# Patient Record
Sex: MALE | Race: WHITE | Employment: OTHER | URBAN - METROPOLITAN AREA
[De-identification: names, ages, dates, MRNs, and addresses within clinical notes are randomized per-mention and may not be internally consistent; named-entity substitution may affect disease eponyms.]

---

## 2017-03-15 ENCOUNTER — HOSPITAL ENCOUNTER (OUTPATIENT)
Dept: OCCUPATIONAL THERAPY | Age: 82
Setting detail: THERAPIES SERIES
Discharge: HOME | End: 2017-03-15
Payer: MEDICARE

## 2017-03-15 PROCEDURE — 97165 OT EVAL LOW COMPLEX 30 MIN: CPT

## 2017-03-15 PROCEDURE — 97110 THERAPEUTIC EXERCISES: CPT

## 2017-03-15 PROCEDURE — G8988 SELF CARE GOAL STATUS: HCPCS

## 2017-03-15 PROCEDURE — G8989 SELF CARE D/C STATUS: HCPCS

## 2017-03-15 PROCEDURE — G8987 SELF CARE CURRENT STATUS: HCPCS

## 2017-03-17 ENCOUNTER — HOSPITAL ENCOUNTER (OUTPATIENT)
Dept: OCCUPATIONAL THERAPY | Age: 82
Setting detail: THERAPIES SERIES
Discharge: HOME | End: 2017-03-17
Payer: MEDICARE

## 2017-03-17 PROCEDURE — 97110 THERAPEUTIC EXERCISES: CPT

## 2017-03-21 ENCOUNTER — HOSPITAL ENCOUNTER (OUTPATIENT)
Dept: OCCUPATIONAL THERAPY | Age: 82
Setting detail: THERAPIES SERIES
Discharge: HOME | End: 2017-03-21
Payer: MEDICARE

## 2017-03-21 PROCEDURE — 97110 THERAPEUTIC EXERCISES: CPT

## 2017-03-24 ENCOUNTER — HOSPITAL ENCOUNTER (OUTPATIENT)
Dept: OCCUPATIONAL THERAPY | Age: 82
Setting detail: THERAPIES SERIES
Discharge: HOME | End: 2017-03-24
Payer: MEDICARE

## 2017-03-24 PROCEDURE — 97110 THERAPEUTIC EXERCISES: CPT

## 2017-03-27 ENCOUNTER — HOSPITAL ENCOUNTER (OUTPATIENT)
Dept: OCCUPATIONAL THERAPY | Age: 82
Setting detail: THERAPIES SERIES
Discharge: HOME | End: 2017-03-27
Payer: MEDICARE

## 2017-03-27 PROCEDURE — 97110 THERAPEUTIC EXERCISES: CPT

## 2017-03-30 ENCOUNTER — HOSPITAL ENCOUNTER (OUTPATIENT)
Dept: OCCUPATIONAL THERAPY | Age: 82
Setting detail: THERAPIES SERIES
Discharge: HOME | End: 2017-03-30
Payer: MEDICARE

## 2017-03-30 PROCEDURE — 97110 THERAPEUTIC EXERCISES: CPT

## 2017-04-03 ENCOUNTER — OFFICE VISIT (OUTPATIENT)
Dept: PULMONOLOGY | Age: 82
End: 2017-04-03
Payer: MEDICARE

## 2017-04-03 VITALS
WEIGHT: 140.2 LBS | BODY MASS INDEX: 22 KG/M2 | OXYGEN SATURATION: 97 % | TEMPERATURE: 97 F | SYSTOLIC BLOOD PRESSURE: 121 MMHG | RESPIRATION RATE: 16 BRPM | HEIGHT: 67 IN | HEART RATE: 85 BPM | DIASTOLIC BLOOD PRESSURE: 78 MMHG

## 2017-04-03 DIAGNOSIS — J43.9 PULMONARY EMPHYSEMA, UNSPECIFIED EMPHYSEMA TYPE (HCC): Primary | ICD-10-CM

## 2017-04-03 DIAGNOSIS — I48.20 CHRONIC ATRIAL FIBRILLATION (HCC): ICD-10-CM

## 2017-04-03 DIAGNOSIS — C32.9 LARYNGEAL CANCER (HCC): ICD-10-CM

## 2017-04-03 PROCEDURE — 99214 OFFICE O/P EST MOD 30 MIN: CPT | Performed by: INTERNAL MEDICINE

## 2017-04-04 ENCOUNTER — HOSPITAL ENCOUNTER (OUTPATIENT)
Dept: OCCUPATIONAL THERAPY | Age: 82
Setting detail: THERAPIES SERIES
Discharge: HOME | End: 2017-04-04
Payer: MEDICARE

## 2017-04-04 PROCEDURE — 97110 THERAPEUTIC EXERCISES: CPT

## 2017-04-07 ENCOUNTER — APPOINTMENT (OUTPATIENT)
Dept: OCCUPATIONAL THERAPY | Age: 82
End: 2017-04-07
Payer: MEDICARE

## 2017-04-11 ENCOUNTER — HOSPITAL ENCOUNTER (OUTPATIENT)
Dept: OCCUPATIONAL THERAPY | Age: 82
Setting detail: THERAPIES SERIES
Discharge: HOME | End: 2017-04-11
Payer: MEDICARE

## 2017-04-11 PROCEDURE — 97110 THERAPEUTIC EXERCISES: CPT

## 2017-04-18 ENCOUNTER — HOSPITAL ENCOUNTER (OUTPATIENT)
Dept: OCCUPATIONAL THERAPY | Age: 82
Setting detail: THERAPIES SERIES
Discharge: HOME | End: 2017-04-18
Payer: MEDICARE

## 2017-04-18 PROCEDURE — 97110 THERAPEUTIC EXERCISES: CPT

## 2017-04-21 ENCOUNTER — HOSPITAL ENCOUNTER (OUTPATIENT)
Dept: OCCUPATIONAL THERAPY | Age: 82
Setting detail: THERAPIES SERIES
Discharge: HOME | End: 2017-04-21
Payer: MEDICARE

## 2017-04-21 PROCEDURE — 97110 THERAPEUTIC EXERCISES: CPT

## 2017-04-25 ENCOUNTER — HOSPITAL ENCOUNTER (OUTPATIENT)
Dept: OCCUPATIONAL THERAPY | Age: 82
Setting detail: THERAPIES SERIES
Discharge: HOME | End: 2017-04-25
Payer: MEDICARE

## 2017-04-25 PROCEDURE — 97110 THERAPEUTIC EXERCISES: CPT

## 2017-04-26 ENCOUNTER — HOSPITAL ENCOUNTER (OUTPATIENT)
Dept: OCCUPATIONAL THERAPY | Age: 82
Setting detail: THERAPIES SERIES
Discharge: HOME | End: 2017-04-26
Payer: MEDICARE

## 2017-04-26 PROCEDURE — 97110 THERAPEUTIC EXERCISES: CPT

## 2017-04-28 ENCOUNTER — APPOINTMENT (OUTPATIENT)
Dept: OCCUPATIONAL THERAPY | Age: 82
End: 2017-04-28
Payer: MEDICARE

## 2017-06-05 ENCOUNTER — APPOINTMENT (OUTPATIENT)
Dept: CT IMAGING | Age: 82
DRG: 392 | End: 2017-06-05
Payer: MEDICARE

## 2017-06-05 ENCOUNTER — HOSPITAL ENCOUNTER (INPATIENT)
Age: 82
LOS: 2 days | Discharge: HOME | DRG: 392 | End: 2017-06-07
Attending: EMERGENCY MEDICINE | Admitting: FAMILY MEDICINE
Payer: MEDICARE

## 2017-06-05 DIAGNOSIS — K57.32 DIVERTICULITIS OF COLON: Primary | ICD-10-CM

## 2017-06-05 DIAGNOSIS — N30.00 ACUTE CYSTITIS WITHOUT HEMATURIA: ICD-10-CM

## 2017-06-05 LAB
-: ABNORMAL
ABSOLUTE EOS #: 0.35 K/UL (ref 0–0.4)
ABSOLUTE LYMPH #: 0.12 K/UL (ref 1–4.8)
ABSOLUTE MONO #: 1.3 K/UL (ref 0.1–0.8)
ALBUMIN SERPL-MCNC: 3.1 G/DL (ref 3.5–5.2)
ALBUMIN/GLOBULIN RATIO: 0.8 (ref 1–2.5)
ALP BLD-CCNC: 75 U/L (ref 40–129)
ALT SERPL-CCNC: 12 U/L (ref 5–41)
AMORPHOUS: ABNORMAL
ANION GAP SERPL CALCULATED.3IONS-SCNC: 13 MMOL/L (ref 9–17)
ANION GAP: 14 MMOL/L (ref 8–16)
AST SERPL-CCNC: 23 U/L
BACTERIA: ABNORMAL
BASOPHILS # BLD: 1 %
BASOPHILS ABSOLUTE: 0.12 K/UL (ref 0–0.2)
BILIRUB SERPL-MCNC: 0.67 MG/DL (ref 0.3–1.2)
BILIRUBIN DIRECT: 0.11 MG/DL
BILIRUBIN URINE: NEGATIVE
BILIRUBIN, INDIRECT: 0.56 MG/DL (ref 0–1)
BUN BLDV-MCNC: 17 MG/DL (ref 8–23)
BUN/CREAT BLD: ABNORMAL (ref 9–20)
CALCIUM SERPL-MCNC: 8.4 MG/DL (ref 8.6–10.4)
CASTS UA: ABNORMAL /LPF (ref 0–2)
CHLORIDE BLD-SCNC: 94 MMOL/L (ref 98–107)
CO2: 25 MMOL/L (ref 20–31)
COLOR: ABNORMAL
COMMENT UA: ABNORMAL
CREAT SERPL-MCNC: 0.83 MG/DL (ref 0.7–1.2)
CRYSTALS, UA: ABNORMAL /HPF
DIFFERENTIAL TYPE: ABNORMAL
EOSINOPHILS RELATIVE PERCENT: 3 %
EPITHELIAL CELLS UA: ABNORMAL /HPF (ref 0–5)
FIO2: ABNORMAL
GFR AFRICAN AMERICAN: >60 ML/MIN
GFR NON-AFRICAN AMERICAN: >60 ML/MIN
GFR SERPL CREATININE-BSD FRML MDRD: ABNORMAL ML/MIN/{1.73_M2}
GFR SERPL CREATININE-BSD FRML MDRD: ABNORMAL ML/MIN/{1.73_M2}
GLOBULIN: ABNORMAL G/DL (ref 1.5–3.8)
GLUCOSE BLD-MCNC: 107 MG/DL (ref 70–99)
GLUCOSE BLD-MCNC: 112 MG/DL (ref 74–106)
GLUCOSE URINE: NEGATIVE
HCO3 VENOUS: 31 MMOL/L (ref 24–30)
HCO3 VENOUS: 32 MMOL/L (ref 24–30)
HCT VFR BLD CALC: 37.7 % (ref 41–53)
HEMOGLOBIN: 12.5 G/DL (ref 13.5–17.5)
KETONES, URINE: NEGATIVE
LACTIC ACID, WHOLE BLOOD: 1.8 MMOL/L (ref 0.7–2.1)
LACTIC ACID: NORMAL MMOL/L
LEUKOCYTE ESTERASE, URINE: ABNORMAL
LIPASE: 17 U/L (ref 13–60)
LYMPHOCYTES # BLD: 1 %
MCH RBC QN AUTO: 27.1 PG (ref 26–34)
MCHC RBC AUTO-ENTMCNC: 33.1 G/DL (ref 31–37)
MCV RBC AUTO: 81.8 FL (ref 80–100)
MONOCYTES # BLD: 11 %
MORPHOLOGY: ABNORMAL
MUCUS: ABNORMAL
NEGATIVE BASE EXCESS, VEN: ABNORMAL (ref 0–2)
NEGATIVE BASE EXCESS, VEN: ABNORMAL (ref 0–2)
NITRITE, URINE: NEGATIVE
O2 DEVICE/FLOW/%: ABNORMAL
O2 SAT, VEN: 20 %
OTHER OBSERVATIONS UA: ABNORMAL
PATIENT TEMP: ABNORMAL
PCO2, VEN: 51 MM HG (ref 39–55)
PCO2, VEN: 51 MM HG (ref 39–55)
PDW BLD-RTO: 16.7 % (ref 12.5–15.4)
PH UA: 5.5 (ref 5–8)
PH VENOUS: 7.39 (ref 7.32–7.42)
PH VENOUS: 7.4 (ref 7.32–7.42)
PLATELET # BLD: 305 K/UL (ref 140–450)
PLATELET ESTIMATE: ABNORMAL
PMV BLD AUTO: 8.4 FL (ref 6–12)
PO2, VEN: 15 MM HG (ref 30–50)
POC BUN: 21 MG/DL (ref 6–20)
POC CHLORIDE: 97 MMOL/L (ref 98–110)
POC CREATININE: 0.9 MG/DL (ref 0.6–1.4)
POC HEMATOCRIT: 42 % (ref 41–53)
POC HEMOGLOBIN: 14.3 G/DL (ref 13.5–17.5)
POC LACTIC ACID, VEN: 1.27 MMOL/L (ref 0.9–1.7)
POC PCO2 TEMP: ABNORMAL MM HG
POC PH TEMP: ABNORMAL
POC PO2 TEMP: ABNORMAL MM HG
POC POTASSIUM: 4.9 MMOL/L (ref 3.5–5.1)
POC SODIUM: 137 MMOL/L (ref 136–145)
POC TROPONIN I: 0 NG/ML (ref 0–0.1)
POC TROPONIN I: 0.01 NG/ML (ref 0–0.1)
POC TROPONIN INTERP: NORMAL
POC TROPONIN INTERP: NORMAL
POSITIVE BASE EXCESS, VEN: 6 (ref 0–2)
POSITIVE BASE EXCESS, VEN: 7 (ref 0–2)
POTASSIUM SERPL-SCNC: 5.5 MMOL/L (ref 3.7–5.3)
PROTEIN UA: ABNORMAL
RBC # BLD: 4.6 M/UL (ref 4.5–5.9)
RBC # BLD: ABNORMAL 10*6/UL
RBC UA: ABNORMAL /HPF (ref 0–2)
RENAL EPITHELIAL, UA: ABNORMAL /HPF
SEG NEUTROPHILS: 84 %
SEGMENTED NEUTROPHILS ABSOLUTE COUNT: 9.91 K/UL (ref 1.8–7.7)
SODIUM BLD-SCNC: 132 MMOL/L (ref 135–144)
SPECIFIC GRAVITY UA: 1.02 (ref 1–1.03)
TOTAL CO2, VENOUS: 33 MMOL/L (ref 25–31)
TOTAL CO2, VENOUS: 34 MMOL/L (ref 25–31)
TOTAL PROTEIN: 6.8 G/DL (ref 6.4–8.3)
TRICHOMONAS: ABNORMAL
TURBIDITY: CLEAR
URINE HGB: NEGATIVE
UROBILINOGEN, URINE: NORMAL
WBC # BLD: 11.8 K/UL (ref 3.5–11)
WBC # BLD: ABNORMAL 10*3/UL
WBC UA: ABNORMAL /HPF (ref 0–5)
YEAST: ABNORMAL

## 2017-06-05 PROCEDURE — 85014 HEMATOCRIT: CPT

## 2017-06-05 PROCEDURE — 87040 BLOOD CULTURE FOR BACTERIA: CPT

## 2017-06-05 PROCEDURE — 1200000000 HC SEMI PRIVATE

## 2017-06-05 PROCEDURE — 83690 ASSAY OF LIPASE: CPT

## 2017-06-05 PROCEDURE — 84520 ASSAY OF UREA NITROGEN: CPT

## 2017-06-05 PROCEDURE — 6360000004 HC RX CONTRAST MEDICATION: Performed by: EMERGENCY MEDICINE

## 2017-06-05 PROCEDURE — 81001 URINALYSIS AUTO W/SCOPE: CPT

## 2017-06-05 PROCEDURE — 84484 ASSAY OF TROPONIN QUANT: CPT

## 2017-06-05 PROCEDURE — 83605 ASSAY OF LACTIC ACID: CPT

## 2017-06-05 PROCEDURE — 82947 ASSAY GLUCOSE BLOOD QUANT: CPT

## 2017-06-05 PROCEDURE — 84295 ASSAY OF SERUM SODIUM: CPT

## 2017-06-05 PROCEDURE — 80048 BASIC METABOLIC PNL TOTAL CA: CPT

## 2017-06-05 PROCEDURE — 96375 TX/PRO/DX INJ NEW DRUG ADDON: CPT

## 2017-06-05 PROCEDURE — 99285 EMERGENCY DEPT VISIT HI MDM: CPT

## 2017-06-05 PROCEDURE — 82435 ASSAY OF BLOOD CHLORIDE: CPT

## 2017-06-05 PROCEDURE — 80076 HEPATIC FUNCTION PANEL: CPT

## 2017-06-05 PROCEDURE — 6360000002 HC RX W HCPCS: Performed by: EMERGENCY MEDICINE

## 2017-06-05 PROCEDURE — 93005 ELECTROCARDIOGRAM TRACING: CPT

## 2017-06-05 PROCEDURE — 85025 COMPLETE CBC W/AUTO DIFF WBC: CPT

## 2017-06-05 PROCEDURE — 82565 ASSAY OF CREATININE: CPT

## 2017-06-05 PROCEDURE — 84132 ASSAY OF SERUM POTASSIUM: CPT

## 2017-06-05 PROCEDURE — 74177 CT ABD & PELVIS W/CONTRAST: CPT

## 2017-06-05 PROCEDURE — 96365 THER/PROPH/DIAG IV INF INIT: CPT

## 2017-06-05 PROCEDURE — 87086 URINE CULTURE/COLONY COUNT: CPT

## 2017-06-05 PROCEDURE — 2580000003 HC RX 258: Performed by: EMERGENCY MEDICINE

## 2017-06-05 PROCEDURE — 82803 BLOOD GASES ANY COMBINATION: CPT

## 2017-06-05 RX ORDER — SODIUM CHLORIDE 0.9 % (FLUSH) 0.9 %
10 SYRINGE (ML) INJECTION PRN
Status: DISCONTINUED | OUTPATIENT
Start: 2017-06-05 | End: 2017-06-06

## 2017-06-05 RX ORDER — DONEPEZIL HYDROCHLORIDE 10 MG/1
10 TABLET, FILM COATED ORAL NIGHTLY
Status: ON HOLD | COMMUNITY
End: 2018-07-17 | Stop reason: HOSPADM

## 2017-06-05 RX ORDER — SODIUM CHLORIDE 0.9 % (FLUSH) 0.9 %
10 SYRINGE (ML) INJECTION EVERY 12 HOURS SCHEDULED
Status: DISCONTINUED | OUTPATIENT
Start: 2017-06-05 | End: 2017-06-06

## 2017-06-05 RX ORDER — 0.9 % SODIUM CHLORIDE 0.9 %
1000 INTRAVENOUS SOLUTION INTRAVENOUS ONCE
Status: COMPLETED | OUTPATIENT
Start: 2017-06-05 | End: 2017-06-05

## 2017-06-05 RX ORDER — MORPHINE SULFATE 4 MG/ML
4 INJECTION, SOLUTION INTRAMUSCULAR; INTRAVENOUS ONCE
Status: DISCONTINUED | OUTPATIENT
Start: 2017-06-05 | End: 2017-06-05

## 2017-06-05 RX ORDER — KETOROLAC TROMETHAMINE 30 MG/ML
30 INJECTION, SOLUTION INTRAMUSCULAR; INTRAVENOUS ONCE
Status: COMPLETED | OUTPATIENT
Start: 2017-06-05 | End: 2017-06-05

## 2017-06-05 RX ADMIN — IOVERSOL 130 ML: 741 INJECTION INTRA-ARTERIAL; INTRAVENOUS at 21:19

## 2017-06-05 RX ADMIN — SODIUM CHLORIDE 1000 ML: 9 INJECTION, SOLUTION INTRAVENOUS at 20:39

## 2017-06-05 RX ADMIN — KETOROLAC TROMETHAMINE 30 MG: 30 INJECTION, SOLUTION INTRAMUSCULAR at 20:38

## 2017-06-05 RX ADMIN — CEFOXITIN 1 G: 1 INJECTION, POWDER, FOR SOLUTION INTRAVENOUS at 20:39

## 2017-06-05 ASSESSMENT — ENCOUNTER SYMPTOMS
CONSTIPATION: 1
NAUSEA: 0
SHORTNESS OF BREATH: 0
ABDOMINAL DISTENTION: 0
COUGH: 0
ABDOMINAL PAIN: 1
VOMITING: 0
BACK PAIN: 0
DIARRHEA: 0
CHEST TIGHTNESS: 0
WHEEZING: 0
TROUBLE SWALLOWING: 0

## 2017-06-05 ASSESSMENT — PAIN SCALES - GENERAL: PAINLEVEL_OUTOF10: 6

## 2017-06-06 PROBLEM — K57.32 DIVERTICULITIS OF LARGE INTESTINE WITHOUT PERFORATION OR ABSCESS WITHOUT BLEEDING: Status: ACTIVE | Noted: 2017-06-06

## 2017-06-06 PROBLEM — R10.32 LEFT LOWER QUADRANT PAIN: Status: ACTIVE | Noted: 2017-06-06

## 2017-06-06 LAB
ABSOLUTE EOS #: 0.4 K/UL (ref 0–0.4)
ABSOLUTE LYMPH #: 0.5 K/UL (ref 1–4.8)
ABSOLUTE MONO #: 0.8 K/UL (ref 0.1–1.2)
ALBUMIN SERPL-MCNC: 2.4 G/DL (ref 3.5–5.2)
ALBUMIN/GLOBULIN RATIO: 0.8 (ref 1–2.5)
ALP BLD-CCNC: 61 U/L (ref 40–129)
ALT SERPL-CCNC: 8 U/L (ref 5–41)
ANION GAP SERPL CALCULATED.3IONS-SCNC: 13 MMOL/L (ref 9–17)
AST SERPL-CCNC: 12 U/L
BASOPHILS # BLD: 1 %
BASOPHILS ABSOLUTE: 0 K/UL (ref 0–0.2)
BILIRUB SERPL-MCNC: 0.83 MG/DL (ref 0.3–1.2)
BUN BLDV-MCNC: 17 MG/DL (ref 8–23)
BUN/CREAT BLD: ABNORMAL (ref 9–20)
CALCIUM SERPL-MCNC: 8.1 MG/DL (ref 8.6–10.4)
CHLORIDE BLD-SCNC: 104 MMOL/L (ref 98–107)
CO2: 19 MMOL/L (ref 20–31)
CREAT SERPL-MCNC: 0.78 MG/DL (ref 0.7–1.2)
CULTURE: NO GROWTH
CULTURE: NORMAL
DIFFERENTIAL TYPE: ABNORMAL
EOSINOPHILS RELATIVE PERCENT: 5 %
GFR AFRICAN AMERICAN: >60 ML/MIN
GFR NON-AFRICAN AMERICAN: >60 ML/MIN
GFR SERPL CREATININE-BSD FRML MDRD: ABNORMAL ML/MIN/{1.73_M2}
GFR SERPL CREATININE-BSD FRML MDRD: ABNORMAL ML/MIN/{1.73_M2}
GLUCOSE BLD-MCNC: 93 MG/DL (ref 70–99)
HCT VFR BLD CALC: 32.7 % (ref 41–53)
HEMOGLOBIN: 10.6 G/DL (ref 13.5–17.5)
LYMPHOCYTES # BLD: 6 %
Lab: NORMAL
MAGNESIUM: 2.1 MG/DL (ref 1.6–2.6)
MCH RBC QN AUTO: 27.2 PG (ref 26–34)
MCHC RBC AUTO-ENTMCNC: 32.4 G/DL (ref 31–37)
MCV RBC AUTO: 83.9 FL (ref 80–100)
MONOCYTES # BLD: 9 %
PDW BLD-RTO: 17.1 % (ref 12.5–15.4)
PLATELET # BLD: 283 K/UL (ref 140–450)
PLATELET ESTIMATE: ABNORMAL
PMV BLD AUTO: 7.7 FL (ref 6–12)
POTASSIUM SERPL-SCNC: 4.6 MMOL/L (ref 3.7–5.3)
RBC # BLD: 3.89 M/UL (ref 4.5–5.9)
RBC # BLD: ABNORMAL 10*6/UL
SEG NEUTROPHILS: 79 %
SEGMENTED NEUTROPHILS ABSOLUTE COUNT: 6.8 K/UL (ref 1.8–7.7)
SODIUM BLD-SCNC: 136 MMOL/L (ref 135–144)
SPECIMEN DESCRIPTION: NORMAL
STATUS: NORMAL
TOTAL PROTEIN: 5.5 G/DL (ref 6.4–8.3)
WBC # BLD: 8.5 K/UL (ref 3.5–11)
WBC # BLD: ABNORMAL 10*3/UL

## 2017-06-06 PROCEDURE — 94664 DEMO&/EVAL PT USE INHALER: CPT

## 2017-06-06 PROCEDURE — 2580000003 HC RX 258: Performed by: NURSE PRACTITIONER

## 2017-06-06 PROCEDURE — 82272 OCCULT BLD FECES 1-3 TESTS: CPT

## 2017-06-06 PROCEDURE — 83735 ASSAY OF MAGNESIUM: CPT

## 2017-06-06 PROCEDURE — 85025 COMPLETE CBC W/AUTO DIFF WBC: CPT

## 2017-06-06 PROCEDURE — 99223 1ST HOSP IP/OBS HIGH 75: CPT | Performed by: INTERNAL MEDICINE

## 2017-06-06 PROCEDURE — 94640 AIRWAY INHALATION TREATMENT: CPT

## 2017-06-06 PROCEDURE — 36415 COLL VENOUS BLD VENIPUNCTURE: CPT

## 2017-06-06 PROCEDURE — 6370000000 HC RX 637 (ALT 250 FOR IP): Performed by: NURSE PRACTITIONER

## 2017-06-06 PROCEDURE — 6360000002 HC RX W HCPCS: Performed by: NURSE PRACTITIONER

## 2017-06-06 PROCEDURE — 1200000000 HC SEMI PRIVATE

## 2017-06-06 PROCEDURE — 6360000002 HC RX W HCPCS: Performed by: EMERGENCY MEDICINE

## 2017-06-06 PROCEDURE — 80053 COMPREHEN METABOLIC PANEL: CPT

## 2017-06-06 PROCEDURE — 2580000003 HC RX 258: Performed by: EMERGENCY MEDICINE

## 2017-06-06 RX ORDER — TAMSULOSIN HYDROCHLORIDE 0.4 MG/1
0.4 CAPSULE ORAL DAILY
Status: DISCONTINUED | OUTPATIENT
Start: 2017-06-06 | End: 2017-06-07 | Stop reason: HOSPADM

## 2017-06-06 RX ORDER — GUAIFENESIN DEXTROMETHORPHAN HYDROBROMIDE ORAL SOLUTION 10; 100 MG/5ML; MG/5ML
10 SOLUTION ORAL EVERY 6 HOURS PRN
Status: DISCONTINUED | OUTPATIENT
Start: 2017-06-06 | End: 2017-06-07 | Stop reason: HOSPADM

## 2017-06-06 RX ORDER — POTASSIUM CHLORIDE 20 MEQ/1
40 TABLET, EXTENDED RELEASE ORAL PRN
Status: DISCONTINUED | OUTPATIENT
Start: 2017-06-06 | End: 2017-06-07 | Stop reason: HOSPADM

## 2017-06-06 RX ORDER — POTASSIUM CHLORIDE 20MEQ/15ML
40 LIQUID (ML) ORAL PRN
Status: DISCONTINUED | OUTPATIENT
Start: 2017-06-06 | End: 2017-06-07 | Stop reason: HOSPADM

## 2017-06-06 RX ORDER — ONDANSETRON 2 MG/ML
4 INJECTION INTRAMUSCULAR; INTRAVENOUS EVERY 6 HOURS PRN
Status: DISCONTINUED | OUTPATIENT
Start: 2017-06-06 | End: 2017-06-07 | Stop reason: HOSPADM

## 2017-06-06 RX ORDER — SODIUM CHLORIDE 0.9 % (FLUSH) 0.9 %
10 SYRINGE (ML) INJECTION EVERY 12 HOURS SCHEDULED
Status: DISCONTINUED | OUTPATIENT
Start: 2017-06-06 | End: 2017-06-07 | Stop reason: HOSPADM

## 2017-06-06 RX ORDER — SODIUM CHLORIDE 9 MG/ML
INJECTION, SOLUTION INTRAVENOUS CONTINUOUS
Status: DISCONTINUED | OUTPATIENT
Start: 2017-06-06 | End: 2017-06-07 | Stop reason: HOSPADM

## 2017-06-06 RX ORDER — BISACODYL 10 MG
10 SUPPOSITORY, RECTAL RECTAL DAILY PRN
Status: DISCONTINUED | OUTPATIENT
Start: 2017-06-06 | End: 2017-06-07 | Stop reason: HOSPADM

## 2017-06-06 RX ORDER — NICOTINE 21 MG/24HR
1 PATCH, TRANSDERMAL 24 HOURS TRANSDERMAL DAILY PRN
Status: DISCONTINUED | OUTPATIENT
Start: 2017-06-06 | End: 2017-06-07 | Stop reason: HOSPADM

## 2017-06-06 RX ORDER — POTASSIUM CHLORIDE 7.45 MG/ML
10 INJECTION INTRAVENOUS PRN
Status: DISCONTINUED | OUTPATIENT
Start: 2017-06-06 | End: 2017-06-07 | Stop reason: HOSPADM

## 2017-06-06 RX ORDER — MORPHINE SULFATE 2 MG/ML
2 INJECTION, SOLUTION INTRAMUSCULAR; INTRAVENOUS
Status: DISCONTINUED | OUTPATIENT
Start: 2017-06-06 | End: 2017-06-07 | Stop reason: HOSPADM

## 2017-06-06 RX ORDER — ZOLPIDEM TARTRATE 5 MG/1
10 TABLET ORAL NIGHTLY PRN
Status: DISCONTINUED | OUTPATIENT
Start: 2017-06-06 | End: 2017-06-07 | Stop reason: HOSPADM

## 2017-06-06 RX ORDER — METOPROLOL SUCCINATE 25 MG/1
12.5 TABLET, EXTENDED RELEASE ORAL 2 TIMES DAILY
Status: DISCONTINUED | OUTPATIENT
Start: 2017-06-06 | End: 2017-06-07 | Stop reason: HOSPADM

## 2017-06-06 RX ORDER — MORPHINE SULFATE 4 MG/ML
4 INJECTION, SOLUTION INTRAMUSCULAR; INTRAVENOUS
Status: DISCONTINUED | OUTPATIENT
Start: 2017-06-06 | End: 2017-06-07 | Stop reason: HOSPADM

## 2017-06-06 RX ORDER — MAGNESIUM SULFATE 1 G/100ML
1 INJECTION INTRAVENOUS PRN
Status: DISCONTINUED | OUTPATIENT
Start: 2017-06-06 | End: 2017-06-07 | Stop reason: HOSPADM

## 2017-06-06 RX ORDER — DONEPEZIL HYDROCHLORIDE 10 MG/1
10 TABLET, FILM COATED ORAL NIGHTLY
Status: DISCONTINUED | OUTPATIENT
Start: 2017-06-06 | End: 2017-06-07 | Stop reason: HOSPADM

## 2017-06-06 RX ORDER — SODIUM CHLORIDE 0.9 % (FLUSH) 0.9 %
10 SYRINGE (ML) INJECTION PRN
Status: DISCONTINUED | OUTPATIENT
Start: 2017-06-06 | End: 2017-06-07 | Stop reason: HOSPADM

## 2017-06-06 RX ORDER — ACETAMINOPHEN 325 MG/1
650 TABLET ORAL EVERY 4 HOURS PRN
Status: DISCONTINUED | OUTPATIENT
Start: 2017-06-06 | End: 2017-06-07 | Stop reason: HOSPADM

## 2017-06-06 RX ADMIN — PIPERACILLIN SODIUM,TAZOBACTAM SODIUM 3.38 G: 3; .375 INJECTION, POWDER, FOR SOLUTION INTRAVENOUS at 15:43

## 2017-06-06 RX ADMIN — PIPERACILLIN SODIUM,TAZOBACTAM SODIUM 3.38 G: 3; .375 INJECTION, POWDER, FOR SOLUTION INTRAVENOUS at 06:00

## 2017-06-06 RX ADMIN — SODIUM CHLORIDE: 900 INJECTION, SOLUTION INTRAVENOUS at 01:57

## 2017-06-06 RX ADMIN — RIVAROXABAN 20 MG: 20 TABLET, FILM COATED ORAL at 08:00

## 2017-06-06 RX ADMIN — PIPERACILLIN SODIUM,TAZOBACTAM SODIUM 3.38 G: 3; .375 INJECTION, POWDER, FOR SOLUTION INTRAVENOUS at 00:09

## 2017-06-06 RX ADMIN — METOPROLOL SUCCINATE 12.5 MG: 25 TABLET, FILM COATED, EXTENDED RELEASE ORAL at 09:00

## 2017-06-06 RX ADMIN — DONEPEZIL HYDROCHLORIDE 10 MG: 10 TABLET, FILM COATED ORAL at 22:23

## 2017-06-06 RX ADMIN — ZOLPIDEM TARTRATE 10 MG: 5 TABLET ORAL at 22:23

## 2017-06-06 RX ADMIN — DONEPEZIL HYDROCHLORIDE 10 MG: 10 TABLET, FILM COATED ORAL at 01:51

## 2017-06-06 RX ADMIN — METOPROLOL SUCCINATE 12.5 MG: 25 TABLET, FILM COATED, EXTENDED RELEASE ORAL at 22:05

## 2017-06-06 RX ADMIN — MOMETASONE FUROATE AND FORMOTEROL FUMARATE DIHYDRATE 2 PUFF: 100; 5 AEROSOL RESPIRATORY (INHALATION) at 22:13

## 2017-06-06 RX ADMIN — PIPERACILLIN SODIUM,TAZOBACTAM SODIUM 3.38 G: 3; .375 INJECTION, POWDER, FOR SOLUTION INTRAVENOUS at 22:05

## 2017-06-06 ASSESSMENT — PAIN SCALES - GENERAL
PAINLEVEL_OUTOF10: 0

## 2017-06-07 VITALS
SYSTOLIC BLOOD PRESSURE: 122 MMHG | HEART RATE: 64 BPM | DIASTOLIC BLOOD PRESSURE: 72 MMHG | RESPIRATION RATE: 18 BRPM | WEIGHT: 135.58 LBS | OXYGEN SATURATION: 96 % | BODY MASS INDEX: 21.28 KG/M2 | TEMPERATURE: 97.5 F | HEIGHT: 67 IN

## 2017-06-07 LAB
ABSOLUTE EOS #: 0.59 K/UL (ref 0–0.4)
ABSOLUTE LYMPH #: 0.52 K/UL (ref 1–4.8)
ABSOLUTE MONO #: 0.72 K/UL (ref 0.1–1.2)
ANION GAP SERPL CALCULATED.3IONS-SCNC: 13 MMOL/L (ref 9–17)
BASOPHILS # BLD: 1 %
BASOPHILS ABSOLUTE: 0.07 K/UL (ref 0–0.2)
BUN BLDV-MCNC: 11 MG/DL (ref 8–23)
BUN/CREAT BLD: ABNORMAL (ref 9–20)
CALCIUM SERPL-MCNC: 7.7 MG/DL (ref 8.6–10.4)
CHLORIDE BLD-SCNC: 102 MMOL/L (ref 98–107)
CO2: 21 MMOL/L (ref 20–31)
CREAT SERPL-MCNC: 0.77 MG/DL (ref 0.7–1.2)
DIFFERENTIAL TYPE: ABNORMAL
EOSINOPHILS RELATIVE PERCENT: 9 %
GFR AFRICAN AMERICAN: >60 ML/MIN
GFR NON-AFRICAN AMERICAN: >60 ML/MIN
GFR SERPL CREATININE-BSD FRML MDRD: ABNORMAL ML/MIN/{1.73_M2}
GFR SERPL CREATININE-BSD FRML MDRD: ABNORMAL ML/MIN/{1.73_M2}
GLUCOSE BLD-MCNC: 89 MG/DL (ref 70–99)
HCT VFR BLD CALC: 31.3 % (ref 41–53)
HEMOGLOBIN: 10.2 G/DL (ref 13.5–17.5)
LYMPHOCYTES # BLD: 8 %
MCH RBC QN AUTO: 27.1 PG (ref 26–34)
MCHC RBC AUTO-ENTMCNC: 32.5 G/DL (ref 31–37)
MCV RBC AUTO: 83.5 FL (ref 80–100)
MONOCYTES # BLD: 11 %
MORPHOLOGY: ABNORMAL
PDW BLD-RTO: 16.7 % (ref 12.5–15.4)
PLATELET # BLD: 266 K/UL (ref 140–450)
PLATELET ESTIMATE: ABNORMAL
PMV BLD AUTO: 7.8 FL (ref 6–12)
POTASSIUM SERPL-SCNC: 4.1 MMOL/L (ref 3.7–5.3)
RBC # BLD: 3.75 M/UL (ref 4.5–5.9)
RBC # BLD: ABNORMAL 10*6/UL
SEG NEUTROPHILS: 71 %
SEGMENTED NEUTROPHILS ABSOLUTE COUNT: 4.6 K/UL (ref 1.8–7.7)
SODIUM BLD-SCNC: 136 MMOL/L (ref 135–144)
WBC # BLD: 6.5 K/UL (ref 3.5–11)
WBC # BLD: ABNORMAL 10*3/UL

## 2017-06-07 PROCEDURE — 36415 COLL VENOUS BLD VENIPUNCTURE: CPT

## 2017-06-07 PROCEDURE — 85025 COMPLETE CBC W/AUTO DIFF WBC: CPT

## 2017-06-07 PROCEDURE — 99239 HOSP IP/OBS DSCHRG MGMT >30: CPT | Performed by: INTERNAL MEDICINE

## 2017-06-07 PROCEDURE — 6370000000 HC RX 637 (ALT 250 FOR IP): Performed by: INTERNAL MEDICINE

## 2017-06-07 PROCEDURE — 6370000000 HC RX 637 (ALT 250 FOR IP): Performed by: NURSE PRACTITIONER

## 2017-06-07 PROCEDURE — 2580000003 HC RX 258: Performed by: NURSE PRACTITIONER

## 2017-06-07 PROCEDURE — 6360000002 HC RX W HCPCS: Performed by: NURSE PRACTITIONER

## 2017-06-07 PROCEDURE — 80048 BASIC METABOLIC PNL TOTAL CA: CPT

## 2017-06-07 RX ORDER — METRONIDAZOLE 500 MG/1
500 TABLET ORAL EVERY 8 HOURS SCHEDULED
Status: DISCONTINUED | OUTPATIENT
Start: 2017-06-07 | End: 2017-06-07 | Stop reason: HOSPADM

## 2017-06-07 RX ORDER — SULFAMETHOXAZOLE AND TRIMETHOPRIM 800; 160 MG/1; MG/1
1 TABLET ORAL EVERY 12 HOURS SCHEDULED
Status: DISCONTINUED | OUTPATIENT
Start: 2017-06-07 | End: 2017-06-07 | Stop reason: HOSPADM

## 2017-06-07 RX ORDER — SULFAMETHOXAZOLE AND TRIMETHOPRIM 800; 160 MG/1; MG/1
1 TABLET ORAL EVERY 12 HOURS SCHEDULED
Qty: 18 TABLET | Refills: 0 | Status: SHIPPED | OUTPATIENT
Start: 2017-06-07 | End: 2017-06-16

## 2017-06-07 RX ORDER — METRONIDAZOLE 500 MG/1
500 TABLET ORAL EVERY 8 HOURS SCHEDULED
Qty: 27 TABLET | Refills: 0 | Status: SHIPPED | OUTPATIENT
Start: 2017-06-07 | End: 2017-06-16

## 2017-06-07 RX ADMIN — SODIUM CHLORIDE: 900 INJECTION, SOLUTION INTRAVENOUS at 04:32

## 2017-06-07 RX ADMIN — METRONIDAZOLE 500 MG: 500 TABLET ORAL at 14:03

## 2017-06-07 RX ADMIN — PIPERACILLIN SODIUM,TAZOBACTAM SODIUM 3.38 G: 3; .375 INJECTION, POWDER, FOR SOLUTION INTRAVENOUS at 06:13

## 2017-06-07 RX ADMIN — RIVAROXABAN 20 MG: 20 TABLET, FILM COATED ORAL at 09:26

## 2017-06-07 RX ADMIN — METOPROLOL SUCCINATE 12.5 MG: 25 TABLET, FILM COATED, EXTENDED RELEASE ORAL at 09:26

## 2017-06-07 ASSESSMENT — PAIN SCALES - GENERAL
PAINLEVEL_OUTOF10: 0
PAINLEVEL_OUTOF10: 0

## 2017-06-09 LAB
EKG ATRIAL RATE: 94 BPM
EKG P AXIS: 70 DEGREES
EKG P-R INTERVAL: 160 MS
EKG Q-T INTERVAL: 340 MS
EKG QRS DURATION: 88 MS
EKG QTC CALCULATION (BAZETT): 425 MS
EKG R AXIS: 76 DEGREES
EKG T AXIS: 29 DEGREES
EKG VENTRICULAR RATE: 94 BPM

## 2017-06-11 LAB
CULTURE: NORMAL
Lab: NORMAL
Lab: NORMAL
SPECIMEN DESCRIPTION: NORMAL
SPECIMEN DESCRIPTION: NORMAL
STATUS: NORMAL
STATUS: NORMAL

## 2017-08-09 RX ORDER — LEVOFLOXACIN 500 MG/1
500 TABLET, FILM COATED ORAL DAILY
Qty: 10 TABLET | Refills: 1 | Status: SHIPPED | OUTPATIENT
Start: 2017-08-09 | End: 2017-08-19

## 2017-12-26 RX ORDER — IPRATROPIUM/ALBUTEROL SULFATE 20-100 MCG
MIST INHALER (GRAM) INHALATION
Qty: 4 G | Refills: 11 | Status: SHIPPED | OUTPATIENT
Start: 2017-12-26 | End: 2020-07-13 | Stop reason: SDUPTHER

## 2018-03-01 RX ORDER — LEVOFLOXACIN 500 MG/1
TABLET, FILM COATED ORAL
Qty: 10 TABLET | Refills: 0 | Status: SHIPPED | OUTPATIENT
Start: 2018-03-01 | End: 2018-04-05 | Stop reason: ALTCHOICE

## 2018-04-05 ENCOUNTER — OFFICE VISIT (OUTPATIENT)
Dept: PULMONOLOGY | Age: 83
End: 2018-04-05
Payer: MEDICARE

## 2018-04-05 VITALS
SYSTOLIC BLOOD PRESSURE: 120 MMHG | RESPIRATION RATE: 14 BRPM | TEMPERATURE: 96.6 F | DIASTOLIC BLOOD PRESSURE: 76 MMHG | BODY MASS INDEX: 23.13 KG/M2 | OXYGEN SATURATION: 98 % | HEIGHT: 67 IN | WEIGHT: 147.4 LBS | HEART RATE: 71 BPM

## 2018-04-05 DIAGNOSIS — C32.9 CARCINOMA LARYNX (HCC): ICD-10-CM

## 2018-04-05 DIAGNOSIS — I48.20 CHRONIC ATRIAL FIBRILLATION (HCC): ICD-10-CM

## 2018-04-05 DIAGNOSIS — J44.9 COPD WITH CHRONIC BRONCHITIS AND EMPHYSEMA (HCC): Primary | ICD-10-CM

## 2018-04-05 PROCEDURE — 3023F SPIROM DOC REV: CPT | Performed by: INTERNAL MEDICINE

## 2018-04-05 PROCEDURE — G8420 CALC BMI NORM PARAMETERS: HCPCS | Performed by: INTERNAL MEDICINE

## 2018-04-05 PROCEDURE — 1036F TOBACCO NON-USER: CPT | Performed by: INTERNAL MEDICINE

## 2018-04-05 PROCEDURE — 4040F PNEUMOC VAC/ADMIN/RCVD: CPT | Performed by: INTERNAL MEDICINE

## 2018-04-05 PROCEDURE — G8926 SPIRO NO PERF OR DOC: HCPCS | Performed by: INTERNAL MEDICINE

## 2018-04-05 PROCEDURE — G8427 DOCREV CUR MEDS BY ELIG CLIN: HCPCS | Performed by: INTERNAL MEDICINE

## 2018-04-05 PROCEDURE — 99214 OFFICE O/P EST MOD 30 MIN: CPT | Performed by: INTERNAL MEDICINE

## 2018-04-05 PROCEDURE — 1123F ACP DISCUSS/DSCN MKR DOCD: CPT | Performed by: INTERNAL MEDICINE

## 2018-04-27 RX ORDER — LEVOFLOXACIN 500 MG/1
500 TABLET, FILM COATED ORAL DAILY
Qty: 10 TABLET | Refills: 0 | Status: SHIPPED | OUTPATIENT
Start: 2018-04-27 | End: 2018-05-07

## 2018-04-30 ENCOUNTER — HOSPITAL ENCOUNTER (OUTPATIENT)
Age: 83
Setting detail: OBSERVATION
Discharge: HOME | End: 2018-04-30
Attending: EMERGENCY MEDICINE | Admitting: EMERGENCY MEDICINE
Payer: MEDICARE

## 2018-04-30 ENCOUNTER — APPOINTMENT (OUTPATIENT)
Dept: GENERAL RADIOLOGY | Age: 83
End: 2018-04-30
Payer: MEDICARE

## 2018-04-30 VITALS
BODY MASS INDEX: 21.45 KG/M2 | HEART RATE: 93 BPM | TEMPERATURE: 98.3 F | OXYGEN SATURATION: 95 % | RESPIRATION RATE: 16 BRPM | HEIGHT: 67 IN | DIASTOLIC BLOOD PRESSURE: 74 MMHG | SYSTOLIC BLOOD PRESSURE: 120 MMHG | WEIGHT: 136.69 LBS

## 2018-04-30 DIAGNOSIS — L03.115 CELLULITIS OF RIGHT LOWER EXTREMITY: Primary | ICD-10-CM

## 2018-04-30 DIAGNOSIS — I48.20 CHRONIC ATRIAL FIBRILLATION (HCC): ICD-10-CM

## 2018-04-30 DIAGNOSIS — D72.829 LEUKOCYTOSIS, UNSPECIFIED TYPE: ICD-10-CM

## 2018-04-30 PROBLEM — L03.90 CELLULITIS: Status: ACTIVE | Noted: 2018-04-30

## 2018-04-30 LAB
ANION GAP SERPL CALCULATED.3IONS-SCNC: 10 MMOL/L (ref 9–17)
BNP INTERPRETATION: ABNORMAL
BUN BLDV-MCNC: 18 MG/DL (ref 8–23)
BUN/CREAT BLD: NORMAL (ref 9–20)
C-REACTIVE PROTEIN: 45.7 MG/L (ref 0–5)
CALCIUM SERPL-MCNC: 8.7 MG/DL (ref 8.6–10.4)
CHLORIDE BLD-SCNC: 101 MMOL/L (ref 98–107)
CO2: 27 MMOL/L (ref 20–31)
CREAT SERPL-MCNC: 0.92 MG/DL (ref 0.7–1.2)
CULTURE: NORMAL
DIRECT EXAM: NORMAL
EKG ATRIAL RATE: 129 BPM
EKG Q-T INTERVAL: 332 MS
EKG QRS DURATION: 90 MS
EKG QTC CALCULATION (BAZETT): 443 MS
EKG R AXIS: 76 DEGREES
EKG T AXIS: 39 DEGREES
EKG VENTRICULAR RATE: 107 BPM
GFR AFRICAN AMERICAN: >60 ML/MIN
GFR NON-AFRICAN AMERICAN: >60 ML/MIN
GFR SERPL CREATININE-BSD FRML MDRD: NORMAL ML/MIN/{1.73_M2}
GFR SERPL CREATININE-BSD FRML MDRD: NORMAL ML/MIN/{1.73_M2}
GLUCOSE BLD-MCNC: 96 MG/DL (ref 70–99)
HCT VFR BLD CALC: 44.6 % (ref 40.7–50.3)
HEMOGLOBIN: 13.8 G/DL (ref 13–17)
Lab: NORMAL
MCH RBC QN AUTO: 27.7 PG (ref 25.2–33.5)
MCHC RBC AUTO-ENTMCNC: 30.9 G/DL (ref 28.4–34.8)
MCV RBC AUTO: 89.4 FL (ref 82.6–102.9)
NRBC AUTOMATED: 0 PER 100 WBC
PDW BLD-RTO: 15.4 % (ref 11.8–14.4)
PLATELET # BLD: 304 K/UL (ref 138–453)
PMV BLD AUTO: 9.7 FL (ref 8.1–13.5)
POC TROPONIN I: 0.01 NG/ML (ref 0–0.1)
POC TROPONIN I: 0.01 NG/ML (ref 0–0.1)
POC TROPONIN INTERP: NORMAL
POC TROPONIN INTERP: NORMAL
POTASSIUM SERPL-SCNC: 4.5 MMOL/L (ref 3.7–5.3)
PRO-BNP: 2465 PG/ML
RBC # BLD: 4.99 M/UL (ref 4.21–5.77)
SODIUM BLD-SCNC: 138 MMOL/L (ref 135–144)
SPECIMEN DESCRIPTION: NORMAL
STATUS: NORMAL
URIC ACID: 5.6 MG/DL (ref 3.4–7)
WBC # BLD: 12.1 K/UL (ref 3.5–11.3)

## 2018-04-30 PROCEDURE — 73630 X-RAY EXAM OF FOOT: CPT

## 2018-04-30 PROCEDURE — G0378 HOSPITAL OBSERVATION PER HR: HCPCS

## 2018-04-30 PROCEDURE — 80048 BASIC METABOLIC PNL TOTAL CA: CPT

## 2018-04-30 PROCEDURE — 96365 THER/PROPH/DIAG IV INF INIT: CPT

## 2018-04-30 PROCEDURE — 89060 EXAM SYNOVIAL FLUID CRYSTALS: CPT

## 2018-04-30 PROCEDURE — 85027 COMPLETE CBC AUTOMATED: CPT

## 2018-04-30 PROCEDURE — 94762 N-INVAS EAR/PLS OXIMTRY CONT: CPT

## 2018-04-30 PROCEDURE — 2500000003 HC RX 250 WO HCPCS

## 2018-04-30 PROCEDURE — 84550 ASSAY OF BLOOD/URIC ACID: CPT

## 2018-04-30 PROCEDURE — 87070 CULTURE OTHR SPECIMN AEROBIC: CPT

## 2018-04-30 PROCEDURE — 6360000002 HC RX W HCPCS: Performed by: EMERGENCY MEDICINE

## 2018-04-30 PROCEDURE — 71046 X-RAY EXAM CHEST 2 VIEWS: CPT

## 2018-04-30 PROCEDURE — 6370000000 HC RX 637 (ALT 250 FOR IP): Performed by: INTERNAL MEDICINE

## 2018-04-30 PROCEDURE — 87075 CULTR BACTERIA EXCEPT BLOOD: CPT

## 2018-04-30 PROCEDURE — 6370000000 HC RX 637 (ALT 250 FOR IP): Performed by: EMERGENCY MEDICINE

## 2018-04-30 PROCEDURE — 86140 C-REACTIVE PROTEIN: CPT

## 2018-04-30 PROCEDURE — 99222 1ST HOSP IP/OBS MODERATE 55: CPT | Performed by: INTERNAL MEDICINE

## 2018-04-30 PROCEDURE — 83880 ASSAY OF NATRIURETIC PEPTIDE: CPT

## 2018-04-30 PROCEDURE — 87205 SMEAR GRAM STAIN: CPT

## 2018-04-30 PROCEDURE — 99285 EMERGENCY DEPT VISIT HI MDM: CPT

## 2018-04-30 PROCEDURE — 93970 EXTREMITY STUDY: CPT

## 2018-04-30 PROCEDURE — 93005 ELECTROCARDIOGRAM TRACING: CPT

## 2018-04-30 PROCEDURE — 6370000000 HC RX 637 (ALT 250 FOR IP): Performed by: PODIATRIST

## 2018-04-30 PROCEDURE — 2580000003 HC RX 258: Performed by: EMERGENCY MEDICINE

## 2018-04-30 PROCEDURE — 84484 ASSAY OF TROPONIN QUANT: CPT

## 2018-04-30 RX ORDER — SODIUM CHLORIDE 0.9 % (FLUSH) 0.9 %
10 SYRINGE (ML) INJECTION PRN
Status: DISCONTINUED | OUTPATIENT
Start: 2018-04-30 | End: 2018-04-30 | Stop reason: HOSPADM

## 2018-04-30 RX ORDER — COLCHICINE 0.6 MG/1
0.6 TABLET ORAL 2 TIMES DAILY
Qty: 30 TABLET | Refills: 1 | Status: SHIPPED | OUTPATIENT
Start: 2018-04-30

## 2018-04-30 RX ORDER — COLCHICINE 0.6 MG/1
0.6 TABLET ORAL 2 TIMES DAILY
Qty: 30 TABLET | Refills: 1 | Status: SHIPPED | OUTPATIENT
Start: 2018-04-30 | End: 2018-04-30

## 2018-04-30 RX ORDER — LIDOCAINE HYDROCHLORIDE 10 MG/ML
5 INJECTION, SOLUTION EPIDURAL; INFILTRATION; INTRACAUDAL; PERINEURAL ONCE
Status: DISCONTINUED | OUTPATIENT
Start: 2018-04-30 | End: 2018-04-30

## 2018-04-30 RX ORDER — VANCOMYCIN HYDROCHLORIDE 1 G/200ML
1000 INJECTION, SOLUTION INTRAVENOUS EVERY 24 HOURS
Status: DISCONTINUED | OUTPATIENT
Start: 2018-05-01 | End: 2018-04-30

## 2018-04-30 RX ORDER — COLCHICINE 0.6 MG/1
0.6 TABLET ORAL DAILY
Status: DISCONTINUED | OUTPATIENT
Start: 2018-04-30 | End: 2018-04-30

## 2018-04-30 RX ORDER — SODIUM CHLORIDE 0.9 % (FLUSH) 0.9 %
10 SYRINGE (ML) INJECTION EVERY 12 HOURS SCHEDULED
Status: DISCONTINUED | OUTPATIENT
Start: 2018-04-30 | End: 2018-04-30 | Stop reason: HOSPADM

## 2018-04-30 RX ORDER — DONEPEZIL HYDROCHLORIDE 10 MG/1
10 TABLET, FILM COATED ORAL NIGHTLY
Status: DISCONTINUED | OUTPATIENT
Start: 2018-04-30 | End: 2018-04-30 | Stop reason: HOSPADM

## 2018-04-30 RX ORDER — HYDROCODONE BITARTRATE AND ACETAMINOPHEN 5; 325 MG/1; MG/1
1 TABLET ORAL EVERY 6 HOURS PRN
Qty: 20 TABLET | Refills: 0 | Status: SHIPPED | OUTPATIENT
Start: 2018-04-30 | End: 2018-05-07

## 2018-04-30 RX ORDER — COLCHICINE 0.6 MG/1
1.2 TABLET ORAL ONCE
Status: COMPLETED | OUTPATIENT
Start: 2018-04-30 | End: 2018-04-30

## 2018-04-30 RX ORDER — BUDESONIDE AND FORMOTEROL FUMARATE DIHYDRATE 160; 4.5 UG/1; UG/1
2 AEROSOL RESPIRATORY (INHALATION) 2 TIMES DAILY
Status: DISCONTINUED | OUTPATIENT
Start: 2018-04-30 | End: 2018-04-30

## 2018-04-30 RX ORDER — ACETAMINOPHEN 325 MG/1
650 TABLET ORAL EVERY 4 HOURS PRN
Status: DISCONTINUED | OUTPATIENT
Start: 2018-04-30 | End: 2018-04-30 | Stop reason: HOSPADM

## 2018-04-30 RX ORDER — TAMSULOSIN HYDROCHLORIDE 0.4 MG/1
0.4 CAPSULE ORAL DAILY
Status: DISCONTINUED | OUTPATIENT
Start: 2018-04-30 | End: 2018-04-30 | Stop reason: HOSPADM

## 2018-04-30 RX ORDER — METOPROLOL SUCCINATE 25 MG/1
12.5 TABLET, EXTENDED RELEASE ORAL 2 TIMES DAILY
Status: DISCONTINUED | OUTPATIENT
Start: 2018-04-30 | End: 2018-04-30 | Stop reason: HOSPADM

## 2018-04-30 RX ORDER — ZOLPIDEM TARTRATE 5 MG/1
10 TABLET ORAL NIGHTLY PRN
Status: DISCONTINUED | OUTPATIENT
Start: 2018-04-30 | End: 2018-04-30 | Stop reason: HOSPADM

## 2018-04-30 RX ORDER — BETAMETHASONE SODIUM PHOSPHATE AND BETAMETHASONE ACETATE 3; 3 MG/ML; MG/ML
12 INJECTION, SUSPENSION INTRA-ARTICULAR; INTRALESIONAL; INTRAMUSCULAR; SOFT TISSUE ONCE
Status: DISCONTINUED | OUTPATIENT
Start: 2018-04-30 | End: 2018-04-30

## 2018-04-30 RX ORDER — VANCOMYCIN HYDROCHLORIDE 1 G/200ML
15 INJECTION, SOLUTION INTRAVENOUS ONCE
Status: COMPLETED | OUTPATIENT
Start: 2018-04-30 | End: 2018-04-30

## 2018-04-30 RX ORDER — ALBUTEROL SULFATE 90 UG/1
2 AEROSOL, METERED RESPIRATORY (INHALATION) EVERY 6 HOURS PRN
Status: DISCONTINUED | OUTPATIENT
Start: 2018-04-30 | End: 2018-04-30 | Stop reason: HOSPADM

## 2018-04-30 RX ORDER — LIDOCAINE HYDROCHLORIDE 10 MG/ML
20 INJECTION, SOLUTION EPIDURAL; INFILTRATION; INTRACAUDAL; PERINEURAL ONCE
Status: COMPLETED | OUTPATIENT
Start: 2018-04-30 | End: 2018-04-30

## 2018-04-30 RX ORDER — COLCHICINE 0.6 MG/1
0.6 TABLET ORAL ONCE
Status: COMPLETED | OUTPATIENT
Start: 2018-04-30 | End: 2018-04-30

## 2018-04-30 RX ORDER — LIDOCAINE HYDROCHLORIDE 10 MG/ML
INJECTION, SOLUTION EPIDURAL; INFILTRATION; INTRACAUDAL; PERINEURAL
Status: COMPLETED
Start: 2018-04-30 | End: 2018-04-30

## 2018-04-30 RX ORDER — BUPIVACAINE HYDROCHLORIDE 5 MG/ML
5 INJECTION, SOLUTION PERINEURAL ONCE
Status: DISCONTINUED | OUTPATIENT
Start: 2018-04-30 | End: 2018-04-30 | Stop reason: HOSPADM

## 2018-04-30 RX ADMIN — VANCOMYCIN HYDROCHLORIDE 1000 MG: 1 INJECTION, SOLUTION INTRAVENOUS at 09:11

## 2018-04-30 RX ADMIN — RIVAROXABAN 20 MG: 20 TABLET, FILM COATED ORAL at 11:28

## 2018-04-30 RX ADMIN — Medication 10 ML: at 11:30

## 2018-04-30 RX ADMIN — COLCHICINE 1.2 MG: 0.6 TABLET, FILM COATED ORAL at 14:58

## 2018-04-30 RX ADMIN — TAMSULOSIN HYDROCHLORIDE 0.4 MG: 0.4 CAPSULE ORAL at 11:28

## 2018-04-30 RX ADMIN — COLCHICINE 0.6 MG: 0.6 TABLET, FILM COATED ORAL at 14:59

## 2018-04-30 RX ADMIN — METOPROLOL SUCCINATE 12.5 MG: 25 TABLET, FILM COATED, EXTENDED RELEASE ORAL at 11:27

## 2018-04-30 RX ADMIN — LIDOCAINE HYDROCHLORIDE 20 ML: 10 INJECTION, SOLUTION EPIDURAL; INFILTRATION; INTRACAUDAL; PERINEURAL at 13:51

## 2018-04-30 ASSESSMENT — ENCOUNTER SYMPTOMS
VOMITING: 0
SHORTNESS OF BREATH: 1
COUGH: 1
ABDOMINAL PAIN: 0
NAUSEA: 0
COLOR CHANGE: 1

## 2018-04-30 ASSESSMENT — PAIN SCALES - GENERAL
PAINLEVEL_OUTOF10: 8
PAINLEVEL_OUTOF10: 8
PAINLEVEL_OUTOF10: 0
PAINLEVEL_OUTOF10: 10

## 2018-04-30 ASSESSMENT — PAIN DESCRIPTION - ORIENTATION: ORIENTATION: RIGHT;LEFT

## 2018-04-30 ASSESSMENT — PAIN DESCRIPTION - PAIN TYPE: TYPE: ACUTE PAIN

## 2018-04-30 ASSESSMENT — PAIN DESCRIPTION - LOCATION: LOCATION: FOOT

## 2018-05-05 LAB
CULTURE: ABNORMAL
CULTURE: ABNORMAL
DIRECT EXAM: ABNORMAL
DIRECT EXAM: ABNORMAL
Lab: ABNORMAL
SPECIMEN DESCRIPTION: ABNORMAL
STATUS: ABNORMAL

## 2018-05-08 LAB
CRYSTALS, FLUID: NEGATIVE
SPECIMEN TYPE: NORMAL

## 2018-05-21 ENCOUNTER — OFFICE VISIT (OUTPATIENT)
Dept: PODIATRY | Age: 83
End: 2018-05-21
Payer: MEDICARE

## 2018-05-21 VITALS
DIASTOLIC BLOOD PRESSURE: 87 MMHG | BODY MASS INDEX: 21.94 KG/M2 | WEIGHT: 139.8 LBS | SYSTOLIC BLOOD PRESSURE: 127 MMHG | HEIGHT: 67 IN | HEART RATE: 87 BPM

## 2018-05-21 DIAGNOSIS — M20.21 HALLUX RIGIDUS OF RIGHT FOOT: Primary | ICD-10-CM

## 2018-05-21 DIAGNOSIS — M79.674 GREAT TOE PAIN, RIGHT: ICD-10-CM

## 2018-05-21 PROCEDURE — 99203 OFFICE O/P NEW LOW 30 MIN: CPT | Performed by: PODIATRIST

## 2018-05-24 ENCOUNTER — TELEPHONE (OUTPATIENT)
Dept: PODIATRY | Age: 83
End: 2018-05-24

## 2018-07-14 ENCOUNTER — APPOINTMENT (OUTPATIENT)
Dept: CT IMAGING | Age: 83
DRG: 312 | End: 2018-07-14
Payer: MEDICARE

## 2018-07-14 ENCOUNTER — APPOINTMENT (OUTPATIENT)
Dept: GENERAL RADIOLOGY | Age: 83
DRG: 312 | End: 2018-07-14
Payer: MEDICARE

## 2018-07-14 ENCOUNTER — HOSPITAL ENCOUNTER (INPATIENT)
Age: 83
LOS: 4 days | Discharge: NURSING, REHAB OR LONG-TERM CARE | DRG: 312 | End: 2018-07-18
Attending: EMERGENCY MEDICINE | Admitting: INTERNAL MEDICINE
Payer: MEDICARE

## 2018-07-14 DIAGNOSIS — K57.32 DIVERTICULITIS OF LARGE INTESTINE WITHOUT PERFORATION OR ABSCESS WITHOUT BLEEDING: ICD-10-CM

## 2018-07-14 DIAGNOSIS — S32.120A CLOSED NONDISPLACED ZONE II FRACTURE OF SACRUM, INITIAL ENCOUNTER (HCC): ICD-10-CM

## 2018-07-14 DIAGNOSIS — R55 SYNCOPE AND COLLAPSE: Primary | ICD-10-CM

## 2018-07-14 DIAGNOSIS — S32.10XA CLOSED FRACTURE OF SACRUM, UNSPECIFIED PORTION OF SACRUM, INITIAL ENCOUNTER (HCC): ICD-10-CM

## 2018-07-14 PROBLEM — N39.0 UTI (URINARY TRACT INFECTION): Status: ACTIVE | Noted: 2018-07-14

## 2018-07-14 PROBLEM — N39.0 UTI (URINARY TRACT INFECTION): Status: RESOLVED | Noted: 2018-07-14 | Resolved: 2018-07-14

## 2018-07-14 LAB
ABSOLUTE EOS #: 0 K/UL (ref 0–0.4)
ABSOLUTE IMMATURE GRANULOCYTE: 0 K/UL (ref 0–0.3)
ABSOLUTE LYMPH #: 0.3 K/UL (ref 1–4.8)
ABSOLUTE MONO #: 1.06 K/UL (ref 0.1–0.8)
ALBUMIN SERPL-MCNC: 3.3 G/DL (ref 3.5–5.2)
ALBUMIN/GLOBULIN RATIO: 1 (ref 1–2.5)
ALP BLD-CCNC: 88 U/L (ref 40–129)
ALT SERPL-CCNC: 11 U/L (ref 5–41)
ANION GAP SERPL CALCULATED.3IONS-SCNC: 11 MMOL/L (ref 9–17)
AST SERPL-CCNC: 17 U/L
BASOPHILS # BLD: 0 % (ref 0–2)
BASOPHILS ABSOLUTE: 0 K/UL (ref 0–0.2)
BILIRUB SERPL-MCNC: 0.74 MG/DL (ref 0.3–1.2)
BILIRUBIN URINE: NEGATIVE
BUN BLDV-MCNC: 18 MG/DL (ref 8–23)
BUN/CREAT BLD: ABNORMAL (ref 9–20)
CALCIUM SERPL-MCNC: 8.3 MG/DL (ref 8.6–10.4)
CHLORIDE BLD-SCNC: 103 MMOL/L (ref 98–107)
CO2: 23 MMOL/L (ref 20–31)
COLOR: YELLOW
COMMENT UA: ABNORMAL
CREAT SERPL-MCNC: 0.69 MG/DL (ref 0.7–1.2)
DIFFERENTIAL TYPE: ABNORMAL
EKG ATRIAL RATE: 102 BPM
EKG Q-T INTERVAL: 368 MS
EKG QRS DURATION: 88 MS
EKG QTC CALCULATION (BAZETT): 445 MS
EKG R AXIS: 80 DEGREES
EKG T AXIS: 46 DEGREES
EKG VENTRICULAR RATE: 88 BPM
EOSINOPHILS RELATIVE PERCENT: 0 % (ref 1–4)
GFR AFRICAN AMERICAN: >60 ML/MIN
GFR NON-AFRICAN AMERICAN: >60 ML/MIN
GFR SERPL CREATININE-BSD FRML MDRD: ABNORMAL ML/MIN/{1.73_M2}
GFR SERPL CREATININE-BSD FRML MDRD: ABNORMAL ML/MIN/{1.73_M2}
GLUCOSE BLD-MCNC: 107 MG/DL (ref 70–99)
GLUCOSE URINE: NEGATIVE
HCT VFR BLD CALC: 39.6 % (ref 40.7–50.3)
HEMOGLOBIN: 12.3 G/DL (ref 13–17)
IMMATURE GRANULOCYTES: 0 %
INR BLD: 1.2
KETONES, URINE: ABNORMAL
LEUKOCYTE ESTERASE, URINE: NEGATIVE
LYMPHOCYTES # BLD: 2 % (ref 24–44)
MAGNESIUM: 2 MG/DL (ref 1.6–2.6)
MCH RBC QN AUTO: 27.9 PG (ref 25.2–33.5)
MCHC RBC AUTO-ENTMCNC: 31.1 G/DL (ref 28.4–34.8)
MCV RBC AUTO: 89.8 FL (ref 82.6–102.9)
MONOCYTES # BLD: 7 % (ref 1–7)
MORPHOLOGY: ABNORMAL
NITRITE, URINE: NEGATIVE
NRBC AUTOMATED: 0 PER 100 WBC
PDW BLD-RTO: 15.8 % (ref 11.8–14.4)
PH UA: 5 (ref 5–8)
PLATELET # BLD: 253 K/UL (ref 138–453)
PLATELET ESTIMATE: ABNORMAL
PMV BLD AUTO: 10.2 FL (ref 8.1–13.5)
POC TROPONIN I: 0 NG/ML (ref 0–0.1)
POC TROPONIN I: 0 NG/ML (ref 0–0.1)
POC TROPONIN INTERP: NORMAL
POC TROPONIN INTERP: NORMAL
POTASSIUM SERPL-SCNC: 5 MMOL/L (ref 3.7–5.3)
PROTEIN UA: NEGATIVE
PROTHROMBIN TIME: 12.6 SEC (ref 9–12)
RBC # BLD: 4.41 M/UL (ref 4.21–5.77)
RBC # BLD: ABNORMAL 10*6/UL
SEG NEUTROPHILS: 91 % (ref 36–66)
SEGMENTED NEUTROPHILS ABSOLUTE COUNT: 13.74 K/UL (ref 1.8–7.7)
SODIUM BLD-SCNC: 137 MMOL/L (ref 135–144)
SPECIFIC GRAVITY UA: 1.02 (ref 1–1.03)
TOTAL PROTEIN: 6.7 G/DL (ref 6.4–8.3)
TURBIDITY: CLEAR
URINE HGB: NEGATIVE
UROBILINOGEN, URINE: NORMAL
VITAMIN D 25-HYDROXY: 16.6 NG/ML (ref 30–100)
WBC # BLD: 15.1 K/UL (ref 3.5–11.3)
WBC # BLD: ABNORMAL 10*3/UL

## 2018-07-14 PROCEDURE — 80053 COMPREHEN METABOLIC PANEL: CPT

## 2018-07-14 PROCEDURE — 72125 CT NECK SPINE W/O DYE: CPT

## 2018-07-14 PROCEDURE — 71045 X-RAY EXAM CHEST 1 VIEW: CPT

## 2018-07-14 PROCEDURE — 72128 CT CHEST SPINE W/O DYE: CPT

## 2018-07-14 PROCEDURE — 84484 ASSAY OF TROPONIN QUANT: CPT

## 2018-07-14 PROCEDURE — 82306 VITAMIN D 25 HYDROXY: CPT

## 2018-07-14 PROCEDURE — 2580000003 HC RX 258: Performed by: EMERGENCY MEDICINE

## 2018-07-14 PROCEDURE — 6370000000 HC RX 637 (ALT 250 FOR IP): Performed by: HOSPITALIST

## 2018-07-14 PROCEDURE — 6360000002 HC RX W HCPCS: Performed by: HOSPITALIST

## 2018-07-14 PROCEDURE — 72192 CT PELVIS W/O DYE: CPT

## 2018-07-14 PROCEDURE — G0378 HOSPITAL OBSERVATION PER HR: HCPCS

## 2018-07-14 PROCEDURE — 93005 ELECTROCARDIOGRAM TRACING: CPT

## 2018-07-14 PROCEDURE — 72170 X-RAY EXAM OF PELVIS: CPT

## 2018-07-14 PROCEDURE — 96374 THER/PROPH/DIAG INJ IV PUSH: CPT

## 2018-07-14 PROCEDURE — 83735 ASSAY OF MAGNESIUM: CPT

## 2018-07-14 PROCEDURE — 70450 CT HEAD/BRAIN W/O DYE: CPT

## 2018-07-14 PROCEDURE — 2580000003 HC RX 258: Performed by: HOSPITALIST

## 2018-07-14 PROCEDURE — 94640 AIRWAY INHALATION TREATMENT: CPT

## 2018-07-14 PROCEDURE — 99223 1ST HOSP IP/OBS HIGH 75: CPT | Performed by: INTERNAL MEDICINE

## 2018-07-14 PROCEDURE — 85610 PROTHROMBIN TIME: CPT

## 2018-07-14 PROCEDURE — 72131 CT LUMBAR SPINE W/O DYE: CPT

## 2018-07-14 PROCEDURE — 99285 EMERGENCY DEPT VISIT HI MDM: CPT

## 2018-07-14 PROCEDURE — 51701 INSERT BLADDER CATHETER: CPT

## 2018-07-14 PROCEDURE — 96375 TX/PRO/DX INJ NEW DRUG ADDON: CPT

## 2018-07-14 PROCEDURE — 6360000002 HC RX W HCPCS: Performed by: EMERGENCY MEDICINE

## 2018-07-14 PROCEDURE — 81003 URINALYSIS AUTO W/O SCOPE: CPT

## 2018-07-14 PROCEDURE — 85025 COMPLETE CBC W/AUTO DIFF WBC: CPT

## 2018-07-14 PROCEDURE — 94762 N-INVAS EAR/PLS OXIMTRY CONT: CPT

## 2018-07-14 PROCEDURE — 1200000000 HC SEMI PRIVATE

## 2018-07-14 PROCEDURE — 87086 URINE CULTURE/COLONY COUNT: CPT

## 2018-07-14 PROCEDURE — 51798 US URINE CAPACITY MEASURE: CPT

## 2018-07-14 RX ORDER — COLCHICINE 0.6 MG/1
0.6 TABLET ORAL 2 TIMES DAILY
Status: DISCONTINUED | OUTPATIENT
Start: 2018-07-14 | End: 2018-07-18 | Stop reason: HOSPADM

## 2018-07-14 RX ORDER — TRAMADOL HYDROCHLORIDE 50 MG/1
50 TABLET ORAL ONCE
Status: COMPLETED | OUTPATIENT
Start: 2018-07-14 | End: 2018-07-14

## 2018-07-14 RX ORDER — ONDANSETRON 2 MG/ML
4 INJECTION INTRAMUSCULAR; INTRAVENOUS EVERY 6 HOURS PRN
Status: DISCONTINUED | OUTPATIENT
Start: 2018-07-14 | End: 2018-07-18 | Stop reason: HOSPADM

## 2018-07-14 RX ORDER — 0.9 % SODIUM CHLORIDE 0.9 %
1000 INTRAVENOUS SOLUTION INTRAVENOUS ONCE
Status: COMPLETED | OUTPATIENT
Start: 2018-07-14 | End: 2018-07-14

## 2018-07-14 RX ORDER — ACETAMINOPHEN 325 MG/1
650 TABLET ORAL EVERY 4 HOURS PRN
Status: CANCELLED | OUTPATIENT
Start: 2018-07-14

## 2018-07-14 RX ORDER — TRAMADOL HYDROCHLORIDE 50 MG/1
25 TABLET ORAL EVERY 8 HOURS PRN
Status: DISCONTINUED | OUTPATIENT
Start: 2018-07-14 | End: 2018-07-16

## 2018-07-14 RX ORDER — DONEPEZIL HYDROCHLORIDE 10 MG/1
10 TABLET, FILM COATED ORAL NIGHTLY
Status: DISCONTINUED | OUTPATIENT
Start: 2018-07-14 | End: 2018-07-14

## 2018-07-14 RX ORDER — ERGOCALCIFEROL 1.25 MG/1
50000 CAPSULE ORAL WEEKLY
Status: DISCONTINUED | OUTPATIENT
Start: 2018-07-14 | End: 2018-07-18 | Stop reason: HOSPADM

## 2018-07-14 RX ORDER — METOPROLOL SUCCINATE 25 MG/1
12.5 TABLET, EXTENDED RELEASE ORAL 2 TIMES DAILY
Status: DISCONTINUED | OUTPATIENT
Start: 2018-07-14 | End: 2018-07-18 | Stop reason: HOSPADM

## 2018-07-14 RX ORDER — BUDESONIDE AND FORMOTEROL FUMARATE DIHYDRATE 160; 4.5 UG/1; UG/1
2 AEROSOL RESPIRATORY (INHALATION) 2 TIMES DAILY
Status: DISCONTINUED | OUTPATIENT
Start: 2018-07-14 | End: 2018-07-14

## 2018-07-14 RX ORDER — CALCIUM CARBONATE 200(500)MG
500 TABLET,CHEWABLE ORAL DAILY
Status: DISCONTINUED | OUTPATIENT
Start: 2018-07-14 | End: 2018-07-15

## 2018-07-14 RX ORDER — ALBUTEROL SULFATE 90 UG/1
2 AEROSOL, METERED RESPIRATORY (INHALATION) EVERY 6 HOURS PRN
Status: DISCONTINUED | OUTPATIENT
Start: 2018-07-14 | End: 2018-07-18 | Stop reason: HOSPADM

## 2018-07-14 RX ORDER — MORPHINE SULFATE 4 MG/ML
4 INJECTION, SOLUTION INTRAMUSCULAR; INTRAVENOUS ONCE
Status: COMPLETED | OUTPATIENT
Start: 2018-07-14 | End: 2018-07-14

## 2018-07-14 RX ORDER — SODIUM CHLORIDE 9 MG/ML
INJECTION, SOLUTION INTRAVENOUS CONTINUOUS
Status: DISCONTINUED | OUTPATIENT
Start: 2018-07-14 | End: 2018-07-15

## 2018-07-14 RX ORDER — SODIUM CHLORIDE 0.9 % (FLUSH) 0.9 %
10 SYRINGE (ML) INJECTION EVERY 12 HOURS SCHEDULED
Status: DISCONTINUED | OUTPATIENT
Start: 2018-07-14 | End: 2018-07-18 | Stop reason: HOSPADM

## 2018-07-14 RX ORDER — SODIUM CHLORIDE 0.9 % (FLUSH) 0.9 %
10 SYRINGE (ML) INJECTION PRN
Status: CANCELLED | OUTPATIENT
Start: 2018-07-14

## 2018-07-14 RX ORDER — TAMSULOSIN HYDROCHLORIDE 0.4 MG/1
0.3 CAPSULE ORAL DAILY
Status: DISCONTINUED | OUTPATIENT
Start: 2018-07-14 | End: 2018-07-18 | Stop reason: HOSPADM

## 2018-07-14 RX ORDER — SODIUM CHLORIDE 0.9 % (FLUSH) 0.9 %
10 SYRINGE (ML) INJECTION PRN
Status: DISCONTINUED | OUTPATIENT
Start: 2018-07-14 | End: 2018-07-18 | Stop reason: HOSPADM

## 2018-07-14 RX ORDER — SODIUM CHLORIDE 0.9 % (FLUSH) 0.9 %
10 SYRINGE (ML) INJECTION EVERY 12 HOURS SCHEDULED
Status: CANCELLED | OUTPATIENT
Start: 2018-07-14

## 2018-07-14 RX ADMIN — RIVAROXABAN 20 MG: 20 TABLET, FILM COATED ORAL at 17:46

## 2018-07-14 RX ADMIN — METOPROLOL SUCCINATE 12.5 MG: 25 TABLET, FILM COATED, EXTENDED RELEASE ORAL at 13:23

## 2018-07-14 RX ADMIN — ALBUTEROL SULFATE 2 PUFF: 90 AEROSOL, METERED RESPIRATORY (INHALATION) at 12:17

## 2018-07-14 RX ADMIN — ERGOCALCIFEROL 50000 UNITS: 1.25 CAPSULE ORAL at 17:46

## 2018-07-14 RX ADMIN — SODIUM CHLORIDE 1000 ML: 9 INJECTION, SOLUTION INTRAVENOUS at 07:55

## 2018-07-14 RX ADMIN — TRAMADOL HYDROCHLORIDE 50 MG: 50 TABLET, FILM COATED ORAL at 13:23

## 2018-07-14 RX ADMIN — MORPHINE SULFATE 4 MG: 4 INJECTION INTRAVENOUS at 09:36

## 2018-07-14 RX ADMIN — IPRATROPIUM BROMIDE 2 PUFF: 17 AEROSOL, METERED RESPIRATORY (INHALATION) at 12:17

## 2018-07-14 RX ADMIN — SODIUM CHLORIDE: 9 INJECTION, SOLUTION INTRAVENOUS at 17:46

## 2018-07-14 RX ADMIN — MOMETASONE FUROATE AND FORMOTEROL FUMARATE DIHYDRATE 2 PUFF: 200; 5 AEROSOL RESPIRATORY (INHALATION) at 21:38

## 2018-07-14 RX ADMIN — TAMSULOSIN HYDROCHLORIDE 0.4 MG: 0.4 CAPSULE ORAL at 13:23

## 2018-07-14 RX ADMIN — CEFTRIAXONE SODIUM 1 G: 1 INJECTION, POWDER, FOR SOLUTION INTRAMUSCULAR; INTRAVENOUS at 17:46

## 2018-07-14 RX ADMIN — COLCHICINE 0.6 MG: 0.6 TABLET, FILM COATED ORAL at 22:18

## 2018-07-14 RX ADMIN — COLCHICINE 0.6 MG: 0.6 TABLET, FILM COATED ORAL at 13:23

## 2018-07-14 RX ADMIN — TRAMADOL HYDROCHLORIDE 25 MG: 50 TABLET, FILM COATED ORAL at 22:19

## 2018-07-14 ASSESSMENT — PAIN SCALES - GENERAL
PAINLEVEL_OUTOF10: 2
PAINLEVEL_OUTOF10: 4
PAINLEVEL_OUTOF10: 4
PAINLEVEL_OUTOF10: 3
PAINLEVEL_OUTOF10: 2
PAINLEVEL_OUTOF10: 8

## 2018-07-14 ASSESSMENT — PAIN DESCRIPTION - PROGRESSION: CLINICAL_PROGRESSION: NOT CHANGED

## 2018-07-14 ASSESSMENT — PAIN DESCRIPTION - FREQUENCY
FREQUENCY: CONTINUOUS
FREQUENCY: CONTINUOUS

## 2018-07-14 ASSESSMENT — ENCOUNTER SYMPTOMS
NAUSEA: 0
DIARRHEA: 0
SORE THROAT: 0
VOMITING: 0
WHEEZING: 0
BACK PAIN: 1
PHOTOPHOBIA: 0
COUGH: 0
SHORTNESS OF BREATH: 0
BLOOD IN STOOL: 0
FACIAL SWELLING: 0
CHEST TIGHTNESS: 0
ABDOMINAL PAIN: 0

## 2018-07-14 ASSESSMENT — PAIN DESCRIPTION - PAIN TYPE
TYPE: ACUTE PAIN

## 2018-07-14 ASSESSMENT — PAIN DESCRIPTION - ORIENTATION
ORIENTATION: MID;LOWER
ORIENTATION: RIGHT
ORIENTATION: RIGHT;LEFT

## 2018-07-14 ASSESSMENT — PAIN DESCRIPTION - ONSET
ONSET: ON-GOING
ONSET: ON-GOING

## 2018-07-14 ASSESSMENT — PAIN DESCRIPTION - DESCRIPTORS
DESCRIPTORS: DISCOMFORT
DESCRIPTORS: ACHING;DISCOMFORT

## 2018-07-14 ASSESSMENT — PAIN DESCRIPTION - LOCATION
LOCATION: HIP
LOCATION: LEG
LOCATION: BACK

## 2018-07-14 NOTE — PROGRESS NOTES
Patient c/o burning when urinating with frequency. Patient only urinating only small amounts at a time.   Resident notified, order placed    Bladder scanned with for 270mL  Straight cath pt for 300mL  Urine culture sent

## 2018-07-14 NOTE — ED NOTES
Report given to [email or web address], [address] [place] [address] Abraham Macias RN  07/14/18 [id number]

## 2018-07-14 NOTE — ED PROVIDER NOTES
[facility]  Emergency Department Encounter  Emergency Medicine Resident     Pt Name: Tanna Hernandes  MRN: [id number]  [place] 10/26/1931  Date of evaluation: 7/14/18  PCP:  Alma Beltran MD    [address] [place] [address]       Chief Complaint   Patient presents with   Daune Aliment    Hip Pain       HISTORY OF PRESENT ILLNESS  (Location/Symptom, Timing/Onset, Context/Setting, Quality, Duration, Modifying Factors, Severity.)      Tanna Hernandes is a 80 y.o. male who presents with Syncopal episode at home. Patient states she was walking in his house at midnight last night. He fell, complaining of right hip pain and low back pain. He is on Xarelto for atrial fibrillation. Denies any loss of consciousness or head trauma. He states his chronic neck pain as well. Patient was unable to ambulate and had to be assisted by EMS. He states he's had  3 syncopal episodes last 3 nights. PAST MEDICAL / SURGICAL / SOCIAL / FAMILY HISTORY      has a past medical history of Atrial fibrillation ([facility] [place] [address] Cancer [facility]); COPD (chronic obstructive pulmonary disease) ([facility]); and Hernia, inguinal, bilateral.     has a past surgical history that includes Small intestine surgery; vascular surgery (Bilateral, 05/09/2014); Cataract removal; Colonoscopy; Colonoscopy (5/12/15); Appendectomy; and Cardiac surgery (Right, 5-29-15). Social History     Social History    Marital status:      Spouse name: N/A    Number of children: N/A    Years of education: N/A     Occupational History    Not on file.      Social History Main Topics    Smoking status: Former Smoker    Smokeless tobacco: Never Used    Alcohol use 0.0 oz/week      Comment: socially    Drug use: No    Sexual activity: Not on file     Other Topics Concern    Not on file     Social History Narrative    No narrative on file       Family History   Problem Relation Age of Onset    Cancer Father         prostate    Cancer Sister         in abdomen    Cancer Brother swelling. Gastrointestinal: Negative for abdominal pain, blood in stool, diarrhea, nausea and vomiting. Genitourinary: Negative for dysuria and hematuria. Musculoskeletal: Positive for back pain and gait problem. Negative for joint swelling, neck pain and neck stiffness. Skin: Negative for pallor and rash. Neurological: Positive for syncope. Negative for weakness, light-headedness and headaches. Hematological: Negative for adenopathy. Psychiatric/Behavioral: Negative for behavioral problems and confusion. The patient is not nervous/anxious. All other systems reviewed and are negative. PHYSICAL EXAM   (up to 7 for level 4, 8 or more for level 5)      INITIAL VITALS:   BP (!) 165/106   Pulse 93   Temp 99.1 °F (37.3 °C) (Oral)   Resp 22   Ht 5' 7\" (1.702 m)   Wt 142 lb (64.4 kg)   SpO2 95%   BMI 22.24 kg/m²     Physical Exam   Constitutional: He is oriented to person, place, and time. He appears well-developed and well-nourished. No distress. HENT:   Head: Normocephalic and atraumatic. Mouth/Throat: Oropharynx is clear and moist. No oropharyngeal exudate. Eyes: Conjunctivae are normal. Pupils are equal, round, and reactive to light. No scleral icterus. Neck: Normal range of motion. Neck supple. No JVD present. No tracheal deviation present. Cardiovascular: Normal rate, regular rhythm, normal heart sounds and intact distal pulses. Exam reveals no gallop and no friction rub. No murmur heard. Pulmonary/Chest: Effort normal and breath sounds normal. No stridor. No respiratory distress. He has no wheezes. He has no rales. He exhibits no tenderness. Abdominal: Soft. Bowel sounds are normal. He exhibits no mass. There is no tenderness. There is no rebound and no guarding. Musculoskeletal: Normal range of motion. He exhibits no edema. Lumbar back: He exhibits tenderness, bony tenderness and spasm.         Back:    No step-off,  Probable spinal tenderness to lumbar spine, therapeutic interchange was not accepted:      Answer:   [place] [facility] Pharmacy to Substitute    colchicine (COLCRYS) tablet 0.6 mg    donepezil (ARICEPT) tablet 10 mg    metoprolol succinate (TOPROL XL) extended release tablet 12.5 mg    tamsulosin (FLOMAX) capsule 0.4 mg    rivaroxaban (XARELTO) tablet 20 mg    sodium chloride flush 0.9 % injection 10 mL    sodium chloride flush 0.9 % injection 10 mL    magnesium hydroxide (MILK OF MAGNESIA) 400 MG/5ML suspension 30 mL    ondansetron (ZOFRAN) injection 4 mg    mometasone-formoterol (DULERA) 200-5 MCG/ACT inhaler 2 puff    AND Linked Order Group     albuterol sulfate  (90 Base) MCG/ACT inhaler 2 puff     ipratropium (ATROVENT HFA) 17 MCG/ACT inhaler 2 puff       DDX: Fracture, contusion, paraphimosis present  Syncope, MI, arrhythmia    DIAGNOSTIC RESULTS / EMERGENCY DEPARTMENT COURSE / MDM     LABS:  Results for orders placed or performed during the hospital encounter of 07/14/18   CBC WITH AUTO DIFFERENTIAL   Result Value Ref Range    WBC 15.1 (H) 3.5 - 11.3 k/uL    RBC 4.41 4.21 - 5.77 m/uL    Hemoglobin 12.3 (L) 13.0 - 17.0 g/dL    Hematocrit 39.6 (L) 40.7 - 50.3 %    MCV 89.8 82.6 - 102.9 fL    MCH 27.9 25.2 - 33.5 pg    MCHC 31.1 28.4 - 34.8 g/dL    RDW 15.8 (H) 11.8 - 14.4 %    Platelets 493 667 - 772 k/uL    MPV 10.2 8.1 - 13.5 fL    NRBC Automated 0.0 0.0 per 100 WBC    Differential Type NOT REPORTED     WBC Morphology NOT REPORTED     RBC Morphology NOT REPORTED     Platelet Estimate NOT REPORTED     Immature Granulocytes 0 0 %    Seg Neutrophils 91 (H) 36 - 66 %    Lymphocytes 2 (L) 24 - 44 %    Monocytes 7 1 - 7 %    Eosinophils % 0 (L) 1 - 4 %    Basophils 0 0 - 2 %    Absolute Immature Granulocyte 0.00 0.00 - 0.30 k/uL    Segs Absolute 13.74 (H) 1.8 - 7.7 k/uL    Absolute Lymph # 0.30 (L) 1.0 - 4.8 k/uL    Absolute Mono # 1.06 (H) 0.1 - 0.8 k/uL    Absolute Eos # 0.00 0.0 - 0.4 k/uL    Basophils # 0.00 0.0 - 0.2 k/uL    Morphology ANISOCYTOSIS PRESENT    Comprehensive Metabolic Panel   Result Value Ref Range    Glucose 107 (H) 70 - 99 mg/dL    BUN 18 8 - 23 mg/dL    CREATININE 0.69 (L) 0.70 - 1.20 mg/dL    Bun/Cre Ratio NOT REPORTED 9 - 20    Calcium 8.3 (L) 8.6 - 10.4 mg/dL    Sodium 137 135 - 144 mmol/L    Potassium 5.0 3.7 - 5.3 mmol/L    Chloride 103 98 - 107 mmol/L    CO2 23 20 - 31 mmol/L    Anion Gap 11 9 - 17 mmol/L    Alkaline Phosphatase 88 40 - 129 U/L    ALT 11 5 - 41 U/L    AST 17 <40 U/L    Total Bilirubin 0.74 0.3 - 1.2 mg/dL    Total Protein 6.7 6.4 - 8.3 g/dL    Alb 3.3 (L) 3.5 - 5.2 g/dL    Albumin/Globulin Ratio 1.0 1.0 - 2.5    GFR Non-African American >60 >60 mL/min    GFR African American >60 >60 mL/min    GFR Comment          GFR Staging NOT REPORTED    MAGNESIUM   Result Value Ref Range    Magnesium 2.0 1.6 - 2.6 mg/dL   Protime-INR   Result Value Ref Range    Protime 12.6 (H) 9.0 - 12.0 sec    INR 1.2    Vitamin D 25 Hydroxy   Result Value Ref Range    Vit D, 25-Hydroxy 16.6 (L) 30.0 - 100.0 ng/mL   POCT troponin   Result Value Ref Range    POC Troponin I 0.00 0.00 - 0.10 ng/mL    POC Troponin Interp       The Troponin-I (POC) results cannot be compared to the Troponin-T results. POCT troponin   Result Value Ref Range    POC Troponin I 0.00 0.00 - 0.10 ng/mL    POC Troponin Interp       The Troponin-I (POC) results cannot be compared to the Troponin-T results. IMPRESSION: A 10year old male presents for evaluation after syncopal episode and lower back pain. Patient fell after walking in the kitchen tonight. He thinks maybe his medication. Patient is on Ambien. He has paraspinal and midline low back pain as well as sacral pain. She is on Xarelto and does have neck pain as well. Plan is for CT head, C-spine, T-spine, L-spine as well as pelvis to rule out fractures. Likely admit patient due to syncope as this is third syncopal episode 3 days.     RADIOLOGY:  Xr Pelvis (1-2 Views)    Result Date: the mA/kV was utilized to reduce the radiation dose to as low as reasonably achievable. COMPARISON: None. HISTORY: ORDERING SYSTEM PROVIDED HISTORY: fall, on xarelto Headache and neck pain following trauma CT HEAD FINDINGS: BRAIN/VENTRICLES: There is no acute intracranial hemorrhage, mass effect or midline shift. No abnormal extra-axial fluid collection. The gray-white differentiation is maintained without evidence of an acute infarct. There is prominence of the ventricles and sulci due to global parenchymal volume loss. There are nonspecific areas of hypoattenuation within the periventricular and subcortical white matter, which likely represent chronic microvascular ischemic change. ORBITS: The visualized portion of the orbits demonstrate no acute abnormality. SINUSES: The visualized paranasal sinuses and mastoid air cells demonstrate no acute abnormality. SOFT TISSUES/SKULL: No acute abnormality of the visualized skull or soft tissues. Calcifications involving bilateral carotid vasculature reflect calcific atherosclerosis. CT CERVICAL SPINE FINDINGS: BONES/ALIGNMENT: 7 typical cervical vertebra present maintain normal height and alignment. DEGENERATIVE CHANGES: Degenerative disc disease and facet and uncovertebral joint arthrosis predominate mid and lower cervical spine. No canal stenosis or significant neural foraminal narrowing is seen. SOFT TISSUES: Soft tissue structures surrounding the cervical spine appear unremarkable. Visualized portions of the upper hemithoraces, skullbase and posterior fossa contents appear unremarkable. Calcifications involving bilateral carotid vasculature reflect calcific atherosclerosis. Emphysematous changes are noted at the lung apices. No acute intracranial abnormality. Degenerative changes involving the cervical spine described above. No cervical fracture or acute traumatic subluxation.  Calcifications involving bilateral carotid vasculature reflect calcific

## 2018-07-14 NOTE — H&P
0.5 ML CHARLIE injection  10/10/16   Historical Provider, MD   ipratropium (ATROVENT) 0.06 % nasal spray  11/9/16   Historical Provider, MD   dextromethorphan-guaiFENesin (ROBITUSSIN-DM)  MG/5ML syrup Take 10 mLs by mouth every 6 hours as needed for Cough. 3/10/15   Be Parikh MD   budesonide-formoterol (SYMBICORT) 160-4.5 MCG/ACT AERO Inhale 2 puffs into the lungs 2 times daily. Historical Provider, MD   tamsulosin (FLOMAX) 0.4 MG capsule Take 0.3 mg by mouth daily     Historical Provider, MD   zolpidem (AMBIEN) 5 MG tablet Take 10 mg by mouth nightly as needed for Sleep. Historical Provider, MD   metoprolol (TOPROL-XL) 25 MG XL tablet Take 12.5 mg by mouth 2 times daily. Historical Provider, MD   albuterol-ipratropium (COMBIVENT)  MCG/ACT inhaler Inhale 2 puffs into the lungs every 6 hours as needed for Wheezing. Historical Provider, MD       ALLERGIES      Codeine; Fentanyl; and Pcn [penicillins]    SOCIAL HISTORY       reports that he has quit smoking. He has never used smokeless tobacco. He reports that he drinks alcohol. He reports that he does not use drugs. FAMILY HISTORY      family history includes Cancer in his brother, father, and sister. REVIEW OF SYSTEMS      · Constitutional: positive for fall  · Eyes: Negative for visual changes, diplopia, scleral icterus. · ENT: Negative for Headaches, hearing loss, vertigo, mouth sores, sore throat. · Cardiovascular: Negative for lightheadedness/orthostatic symptoms ,chest pain, dyspnea on exertion, palpitations or loss of consciousness. · Respiratory: Negative for cough or wheezing, sputum production, hemoptysis, pleuritic pain. · Gastrointestinal: Negative for nausea/vomiting, change in bowel habits, abdominal pain, dysphagia/appetite loss, hematemesis, blood in stools. · Genitourinary:Negative for change in bladder habits, dysuria, trouble voiding, hematuria. · Musculoskeletal: Hip pain.   · Integumentary: Negative for rash, pruritis. · Neurological: Negative for headache, dizziness, change in muscle strength, numbness/tingling, change in gait, balance, coordination,   · Psychiatric: negative for change in mood, affect, memory, mentation, behavior. · Endocrine: negative for temperature intolerance, excessive thirst, fluid intake, or urination, tremor. · Hematologic/Lymphatic: negative for abnormal bruising or bleeding, blood clots, swollen lymph nodes. · Allergic/Immunologic: negative for nasal congestion, pruritis, hives. PHYSICAL EXAM      /66   Pulse 88   Temp 98.1 °F (36.7 °C) (Oral)   Resp 22   SpO2 96%      · General appearance: well nourished, mildly dehydrated. · HEENT: Head: Normocephalic, no lesions, without obvious abnormality. · Lungs: clear to auscultation bilaterally  · Heart: irregular rate and rhythm, S1, S2 normal  · Abdomen: soft, non-tender; bowel sounds normal; no masses,  no organomegaly  · Extremities: extremities normal, atraumatic, no cyanosis or edema  · Neurological: Gait normal. Reflexes normal and symmetric.  Sensation grossly normal  · Skin - no rash, no lump   · Eye no icterus no redness  · Psych-normal affect   · NEURO-no limb weakness  No facial droop  · Lymphatic system-no lymphadenopathy no splenomegaly     DIAGNOSTICS      Laboratory Testing:  CBC:   Recent Labs      07/14/18   0647   WBC  15.1*   HGB  12.3*   PLT  253     BMP:    Recent Labs      07/14/18   0647   NA  137   K  5.0   CL  103   CO2  23   BUN  18   CREATININE  0.69*   GLUCOSE  107*     S. Calcium:  Recent Labs      07/14/18   0647   CALCIUM  8.3*     S. Magnesium:  Recent Labs      07/14/18   0647   MG  2.0     INR:   Recent Labs      07/14/18   0647   INR  1.2     Hepatic functions:   Recent Labs      07/14/18   0647   ALKPHOS  88   ALT  11   AST  17   PROT  6.7   BILITOT  0.74   LABALBU  3.3*     Cardiac enzymes:  Recent Labs      07/14/18   0630  07/14/18   0955   TROPONINI  0.00  0.00     Thyroid functions: Lab Results   Component Value Date    TSH 1.28 06/17/2015        Imaging/Diagonstics:    Xr Pelvis (1-2 Views)    Result Date: 7/14/2018  EXAMINATION: SINGLE XRAY VIEW OF THE PELVIS 7/14/2018 10:25 am COMPARISON: None. HISTORY: ORDERING SYSTEM PROVIDED HISTORY: Trauma/Fracture TECHNOLOGIST PROVIDED HISTORY: AP, Inlet and Outlet views please, thank you. Reason for exam:->Trauma/Fracture Ordering Physician Provided Reason for Exam: sacral fx,    inlet outlet views Acuity: Acute Type of Exam: Unknown FINDINGS: Bones are osteoporotic. Small nondisplaced sacral fractures demonstrated on CT are not clearly demonstrated radiographically. Other portions of the pelvis appear unremarkable. Bones are osteoporotic. Small nondisplaced sacral fractures demonstrated on CT are not clearly demonstrated radiographically. Xr Pelvis (1-2 Views)    Result Date: 7/14/2018  EXAMINATION: SINGLE XRAY VIEW OF THE PELVIS 7/14/2018 9:43 am COMPARISON: None. HISTORY: ORDERING SYSTEM PROVIDED HISTORY: SACRAL FRACTURE TECHNOLOGIST PROVIDED HISTORY: Reason for exam:->SACRAL FRACTURE FINDINGS: Bilateral sacral fractures are not as well visualized as on prior CT. Right and left hemipelvis appear intact articulating normally at the SI joints and pubic symphysis. Visualized portions of the proximal right and left femur appear unremarkable. Poor visualization of bilateral sacral fractures seen on CT pelvis. Correlate with CT pelvis.      Ct Head Wo Contrast    Result Date: 7/14/2018  EXAMINATION: CT OF THE HEAD WITHOUT CONTRAST; CT OF THE CERVICAL SPINE WITHOUT CONTRAST 7/14/2018 8:27 am TECHNIQUE: CT of the head was performed without the administration of intravenous contrast. Dose modulation, iterative reconstruction, and/or weight based adjustment of the mA/kV was utilized to reduce the radiation dose to as low as reasonably achievable.; CT of the cervical spine was performed without the administration of intravenous contrast. Multiplanar reformatted images are provided for review. Dose modulation, iterative reconstruction, and/or weight based adjustment of the mA/kV was utilized to reduce the radiation dose to as low as reasonably achievable. COMPARISON: None. HISTORY: ORDERING SYSTEM PROVIDED HISTORY: fall, on xarelto Headache and neck pain following trauma CT HEAD FINDINGS: BRAIN/VENTRICLES: There is no acute intracranial hemorrhage, mass effect or midline shift. No abnormal extra-axial fluid collection. The gray-white differentiation is maintained without evidence of an acute infarct. There is prominence of the ventricles and sulci due to global parenchymal volume loss. There are nonspecific areas of hypoattenuation within the periventricular and subcortical white matter, which likely represent chronic microvascular ischemic change. ORBITS: The visualized portion of the orbits demonstrate no acute abnormality. SINUSES: The visualized paranasal sinuses and mastoid air cells demonstrate no acute abnormality. SOFT TISSUES/SKULL: No acute abnormality of the visualized skull or soft tissues. Calcifications involving bilateral carotid vasculature reflect calcific atherosclerosis. CT CERVICAL SPINE FINDINGS: BONES/ALIGNMENT: 7 typical cervical vertebra present maintain normal height and alignment. DEGENERATIVE CHANGES: Degenerative disc disease and facet and uncovertebral joint arthrosis predominate mid and lower cervical spine. No canal stenosis or significant neural foraminal narrowing is seen. SOFT TISSUES: Soft tissue structures surrounding the cervical spine appear unremarkable. Visualized portions of the upper hemithoraces, skullbase and posterior fossa contents appear unremarkable. Calcifications involving bilateral carotid vasculature reflect calcific atherosclerosis. Emphysematous changes are noted at the lung apices. No acute intracranial abnormality. Degenerative changes involving the cervical spine described above.  No of the left hip joint space with associated mild marginal osteophyte spurring. Mild to moderate narrowing of the right hip joint space with associated mild osteophyte spurring. Diffuse osteopenia. Mild degenerative changes in the lower lumbar/lumbosacral spine. Atherosclerotic calcification of the abdominal aorta and iliac branch vasculature. There is also an acute fracture through the anterior aspect of the S2 level with disruption of the cortex on image 94, series 602. There is disruption of the cortex of the anterior sacral ala bilaterally best appreciated on image 30, series 2 likely representing underlying sacral insufficiency fractures. Nondisplaced fracture of the left L5 transverse process on image 11, series 2. Mild degenerative changes of the pubic symphysis. Moderate stool burden. Moderate rectosigmoid and sigmoid colonic diverticulosis. Enlarged prostate with associated calcifications. Correlate with PSA. 1. Acute fracture through the anterior cortex of S2 and bilateral sacral ala and/or sacral insufficiency fractures with disruption of the anterior cortex of the bilateral sacral ala. Underlying diffuse osteopenia. 2. Bilateral femoral head AVN. Subchondral fracturing of the left femoral head and subchondral collapse with moderate left hip osteoarthrosis. Mild right hip osteoarthrosis. 3. Nondisplaced left L5 transverse process fracture. 4. Enlarged prostate. Correlate with PSA. 5. Moderate rectosigmoid and sigmoid diverticulosis. Moderate stool burden. 6. Mild degenerative changes in the lower lumbar/lumbosacral spine. 7. Atherosclerotic calcification of the vasculature. Xr Chest Portable    Result Date: 7/14/2018  EXAMINATION: SINGLE XRAY VIEW OF THE CHEST 7/14/2018 7:16 am COMPARISON: Chest April 30, 2018. HISTORY: ORDERING SYSTEM PROVIDED HISTORY: syncope TECHNOLOGIST PROVIDED HISTORY: Reason for exam:->syncope FINDINGS: Heart appears normal in size. COPD changes.   No focal lung consolidation, pneumothorax, pleural effusion or free air. No free air. No acute process. COPD changes. ASSESSMENT     Active Problems:    COPD (chronic obstructive pulmonary disease) (HCC)    Atrial fibrillation (HCC)    Syncope and collapse      Resolved Problems:    * No resolved hospital problems. *        PLAN      1. Admit to med surgery unit  2. Fall precautions, f/u 2 D echo difficult to orthostatic vitals  3. NS 50 ml/hr  4. Afib rate controlled on metoprolol and Xeralto for AC  5. Tramadol for pain control  6. Orthopedics on board Conservative management. 7.  UA negative for UTI, BPH on flomax  8. COPD will continue bronchodilators  9. Bladder scan and straight cath f/u urine cultures  10. General diet, Vitamin D replacement and calcium. 11. SW PT OT, PN & R consulted      MD HEMANT Mayers[address], [address] [place] [address].    Phone [phone]   Fax: [phone]  Answering Service: [phone]

## 2018-07-14 NOTE — CONSULTS
dementia    PHYSICAL EXAM:  Blood pressure 128/66, pulse 86, temperature 98.1 °F (36.7 °C), temperature source Oral, resp. rate 24, SpO2 95 %. Gen: alert and oriented, NAD, cooperative but poor historian    Head: normocephalic atraumatic     Neck: supple    Chest: Non labored breathing. b/l clavicles without TTP, crepitus, step off, or deformity    Cardiovascular: Regular rate, no dependent edema, distal pulses 2+    Respiratory: Chest symmetric, no accessory muscle use, normal respirations    Abdomen: non distended    Pelvis: stable to anterior and lateral compression. No tenderness on compression. RUE: No ecchymoses, abrasions, deformity, or lacerations. Skin intact. Non tender to palpation. Compartments soft. Ulnar/Median/AIN/PIN/Radial motor intact. C4-T1 SILT. Radial pulse 2+ with BCR    LUE: Abrasion medial epicondyle. No bony tenderness. No ecchymoses, deformity, or lacerations. Skin intact. Non tender to palpation. Compartments soft. Ulnar/Median/AIN/PIN/Radial motor intact. C4-T1 SILT. Radial pulse 2+ with BCR    RLE:  No ecchymoses, abrasions, deformity, or lacerations. Skin intact. Non tender to palpation. Compartments soft. EHL/FHL/TA/GS complex motor intact. Sural, saphenous, superificial/deep peroneal, and plantar nerve distribution SILT. Mild dysesthias noted lateral aspect of foot, but equal bilaterally. Dorsalis pedis/posterior tibial pulses 2+ with BCR. -log roll. Knee ligaments grossly intact. LLE: No ecchymoses, abrasions, deformity, or lacerations. Skin intact. Non tender to palpation. Compartments soft. EHL/FHL/TA/GS complex motor intact. Sural, saphenous, superificial/deep peroneal, and plantar nerve distribution SILT. Mild dysesthias noted lateral aspect of foot, but equal bilaterally. Dorsalis pedis/posterior tibial pulses 2+ with BCR. -log roll. Knee ligaments grossly intact. Sacrum: mild tenderness upon palpation.      LABS:  Recent Labs      07/14/18   0647   WBC 15.1*   HGB  12.3*   HCT  39.6*   PLT  253   INR  1.2   NA  137   K  5.0   BUN  18   CREATININE  0.69*   GLUCOSE  107*      Radiology:    CT scan on 7/14/18 demonstrates insufficiency fractures of bilateral sacral ala nondisplaced, anterior S2 compression insufficiency fracture, bilateral AVN/collapse of femoral head. CT scan compared to previous CT in 2017 demonstrates AVN of femoral head with mild worsening     A/P: 80 y.o. male being seen for bilateral sacral ala fx, nondisplaced and stable, S2 sacral insufficiency fracture    -No immediate/urgent ortho surgical intervention at this time  -Weight bearing as toelrated  -Admitted to medicine for workup of syncope  -Pain control per primary  -Ice for sacrum as tolerated  -DVT ppx: EPC and chemical AC per primary  -Will discuss case with Dr. Florina Klein about definitive management.   -From an orthopedic standpoint patient is fine to weight bear as tolerated and follow up as an outpatient with Dr. Florina Klein. -Please page DO Ortho with any questions or concerns    Quinn Dale,   PGY-1 Department [address], [place]      PGY-2 Addendum    Patient seen and examined. Agree with above assessment by Dr. Deven Bermudez. Patient had multiple mechanical falls at home, most recently found down by his wife. Unknown reasons for falling. Family members at bedside state there is some underlying dementia. Patient endorses pain in his sacrum/buttock. Denies numbness/tingling in his legs, urinary/bowel incontinence. Objective:   Gen: NAD, cooperative, difficult exam/history    Back: mild TTP to the bilateral posterior sacrum    BLE: log roll negative, no pain or instability appreciated on pelvic compressible. Patient able to actively perform a straight leg raise without difficult. Patient able to actively flex and extend the toes, plantar and dorsiflex the ankles. EHL/FHL/TA/GSC motor complex intact grossly. Sural/saphenous/SP/DP/Plantar sensory nerves SILT grossly. DP 2+ and equal bilaterally    Impression/plan: 80 y.o. male sustaining multiple falls from height, suffering the following injuries:    Bilateral sacral ala/insufficiency fractures  S2 sacral insufficiency fracture  L3 and L5 left transverse process fractures      -No acute orthopedic intervention for injuries listed above  -IM to admit as primary for syncope workup  -WBAT  -Pain control PO/IV Medication. Attempt to Wean IV medications. Managed per primary team  -DVT ppx: EPC, ok for chemical AC from orthopedic standpoint. Managed per primary team  -Patient may apply ice to the affected area for pain and swelling relief as tolearted  -Case discussed with Dr. Tanika Laurent to DC from orthopedic surgery standpoint. Patient to follow up with Dr. Bart Robles in 7-10 days after discharge for re-evaluation.  DC instructions placed.  -Please page DO ortho with any questions      Virgilio Paez DO  Orthopedic Surgery Resident, PGY-2  [facility]

## 2018-07-14 NOTE — ED NOTES
Pt arrives to the ED via [facility] for c/o increased dizziness and falls  Pt had two falls this morning and c/o right sided hip pain   Pt pulled over to ED stretcher  Pt rates pain an 8/10  EKG completed, IV established and labs drawn  Pt denies any LOC or head injury  Pt placed on full cardiac monitor      Graeme Cortes RN  07/14/18 9614

## 2018-07-15 ENCOUNTER — APPOINTMENT (OUTPATIENT)
Dept: GENERAL RADIOLOGY | Age: 83
DRG: 312 | End: 2018-07-15
Payer: MEDICARE

## 2018-07-15 LAB
-: NORMAL
ABSOLUTE EOS #: 0.11 K/UL (ref 0–0.4)
ABSOLUTE IMMATURE GRANULOCYTE: 0 K/UL (ref 0–0.3)
ABSOLUTE LYMPH #: 1.32 K/UL (ref 1–4.8)
ABSOLUTE MONO #: 0.11 K/UL (ref 0.1–0.8)
AMORPHOUS: NORMAL
ANION GAP SERPL CALCULATED.3IONS-SCNC: 14 MMOL/L (ref 9–17)
BACTERIA: NORMAL
BASOPHILS # BLD: 1 % (ref 0–2)
BASOPHILS ABSOLUTE: 0.11 K/UL (ref 0–0.2)
BILIRUBIN URINE: NEGATIVE
BUN BLDV-MCNC: 15 MG/DL (ref 8–23)
BUN/CREAT BLD: ABNORMAL (ref 9–20)
CALCIUM IONIZED: 1.17 MMOL/L (ref 1.13–1.33)
CALCIUM SERPL-MCNC: 8 MG/DL (ref 8.6–10.4)
CASTS UA: NORMAL /LPF (ref 0–8)
CHLORIDE BLD-SCNC: 100 MMOL/L (ref 98–107)
CO2: 22 MMOL/L (ref 20–31)
COLOR: YELLOW
COMMENT UA: ABNORMAL
CREAT SERPL-MCNC: 0.78 MG/DL (ref 0.7–1.2)
CRYSTALS, UA: NORMAL /HPF
CULTURE: NO GROWTH
DIFFERENTIAL TYPE: ABNORMAL
EOSINOPHILS RELATIVE PERCENT: 1 % (ref 1–4)
EPITHELIAL CELLS UA: NORMAL /HPF (ref 0–5)
GFR AFRICAN AMERICAN: >60 ML/MIN
GFR NON-AFRICAN AMERICAN: >60 ML/MIN
GFR SERPL CREATININE-BSD FRML MDRD: ABNORMAL ML/MIN/{1.73_M2}
GFR SERPL CREATININE-BSD FRML MDRD: ABNORMAL ML/MIN/{1.73_M2}
GLUCOSE BLD-MCNC: 84 MG/DL (ref 70–99)
GLUCOSE URINE: NEGATIVE
HCT VFR BLD CALC: 40.3 % (ref 40.7–50.3)
HEMOGLOBIN: 12.4 G/DL (ref 13–17)
IMMATURE GRANULOCYTES: 0 %
KETONES, URINE: ABNORMAL
LEUKOCYTE ESTERASE, URINE: NEGATIVE
LYMPHOCYTES # BLD: 12 % (ref 24–44)
Lab: NORMAL
MCH RBC QN AUTO: 27.6 PG (ref 25.2–33.5)
MCHC RBC AUTO-ENTMCNC: 30.8 G/DL (ref 28.4–34.8)
MCV RBC AUTO: 89.8 FL (ref 82.6–102.9)
MONOCYTES # BLD: 1 % (ref 1–7)
MORPHOLOGY: ABNORMAL
MUCUS: NORMAL
NITRITE, URINE: NEGATIVE
NRBC AUTOMATED: 0 PER 100 WBC
OTHER OBSERVATIONS UA: NORMAL
PDW BLD-RTO: 16 % (ref 11.8–14.4)
PH UA: 5 (ref 5–8)
PLATELET # BLD: 213 K/UL (ref 138–453)
PLATELET ESTIMATE: ABNORMAL
PMV BLD AUTO: 10.6 FL (ref 8.1–13.5)
POTASSIUM SERPL-SCNC: 4.1 MMOL/L (ref 3.7–5.3)
PROSTATE SPECIFIC ANTIGEN: 2.94 UG/L
PROTEIN UA: ABNORMAL
RBC # BLD: 4.49 M/UL (ref 4.21–5.77)
RBC # BLD: ABNORMAL 10*6/UL
RBC UA: NORMAL /HPF (ref 0–4)
RENAL EPITHELIAL, UA: NORMAL /HPF
SEG NEUTROPHILS: 85 % (ref 36–66)
SEGMENTED NEUTROPHILS ABSOLUTE COUNT: 9.35 K/UL (ref 1.8–7.7)
SODIUM BLD-SCNC: 136 MMOL/L (ref 135–144)
SPECIFIC GRAVITY UA: 1.02 (ref 1–1.03)
SPECIMEN DESCRIPTION: NORMAL
STATUS: NORMAL
TRICHOMONAS: NORMAL
TURBIDITY: CLEAR
URINE HGB: ABNORMAL
UROBILINOGEN, URINE: NORMAL
WBC # BLD: 11 K/UL (ref 3.5–11.3)
WBC # BLD: ABNORMAL 10*3/UL
WBC UA: NORMAL /HPF (ref 0–5)
YEAST: NORMAL

## 2018-07-15 PROCEDURE — 97162 PT EVAL MOD COMPLEX 30 MIN: CPT

## 2018-07-15 PROCEDURE — G8988 SELF CARE GOAL STATUS: HCPCS

## 2018-07-15 PROCEDURE — 97166 OT EVAL MOD COMPLEX 45 MIN: CPT

## 2018-07-15 PROCEDURE — 2580000003 HC RX 258: Performed by: HOSPITALIST

## 2018-07-15 PROCEDURE — 81001 URINALYSIS AUTO W/SCOPE: CPT

## 2018-07-15 PROCEDURE — G8979 MOBILITY GOAL STATUS: HCPCS

## 2018-07-15 PROCEDURE — 80048 BASIC METABOLIC PNL TOTAL CA: CPT

## 2018-07-15 PROCEDURE — G8987 SELF CARE CURRENT STATUS: HCPCS

## 2018-07-15 PROCEDURE — G0378 HOSPITAL OBSERVATION PER HR: HCPCS

## 2018-07-15 PROCEDURE — 6370000000 HC RX 637 (ALT 250 FOR IP): Performed by: HOSPITALIST

## 2018-07-15 PROCEDURE — 74018 RADEX ABDOMEN 1 VIEW: CPT

## 2018-07-15 PROCEDURE — G0103 PSA SCREENING: HCPCS

## 2018-07-15 PROCEDURE — 36415 COLL VENOUS BLD VENIPUNCTURE: CPT

## 2018-07-15 PROCEDURE — 85025 COMPLETE CBC W/AUTO DIFF WBC: CPT

## 2018-07-15 PROCEDURE — 97535 SELF CARE MNGMENT TRAINING: CPT

## 2018-07-15 PROCEDURE — G8978 MOBILITY CURRENT STATUS: HCPCS

## 2018-07-15 PROCEDURE — 1200000000 HC SEMI PRIVATE

## 2018-07-15 PROCEDURE — 82330 ASSAY OF CALCIUM: CPT

## 2018-07-15 PROCEDURE — 97530 THERAPEUTIC ACTIVITIES: CPT

## 2018-07-15 PROCEDURE — 99232 SBSQ HOSP IP/OBS MODERATE 35: CPT | Performed by: INTERNAL MEDICINE

## 2018-07-15 PROCEDURE — 94640 AIRWAY INHALATION TREATMENT: CPT

## 2018-07-15 RX ORDER — QUETIAPINE FUMARATE 25 MG/1
25 TABLET, FILM COATED ORAL NIGHTLY
Status: DISCONTINUED | OUTPATIENT
Start: 2018-07-15 | End: 2018-07-16

## 2018-07-15 RX ORDER — QUETIAPINE FUMARATE 25 MG/1
12.5 TABLET, FILM COATED ORAL NIGHTLY
Status: DISCONTINUED | OUTPATIENT
Start: 2018-07-15 | End: 2018-07-15

## 2018-07-15 RX ADMIN — TRAMADOL HYDROCHLORIDE 25 MG: 50 TABLET, FILM COATED ORAL at 16:15

## 2018-07-15 RX ADMIN — TAMSULOSIN HYDROCHLORIDE 0.4 MG: 0.4 CAPSULE ORAL at 09:17

## 2018-07-15 RX ADMIN — RIVAROXABAN 20 MG: 20 TABLET, FILM COATED ORAL at 18:26

## 2018-07-15 RX ADMIN — Medication 600 MG: at 12:06

## 2018-07-15 RX ADMIN — ALBUTEROL SULFATE 2 PUFF: 90 AEROSOL, METERED RESPIRATORY (INHALATION) at 08:29

## 2018-07-15 RX ADMIN — Medication 10 ML: at 20:51

## 2018-07-15 RX ADMIN — IPRATROPIUM BROMIDE 2 PUFF: 17 AEROSOL, METERED RESPIRATORY (INHALATION) at 08:29

## 2018-07-15 RX ADMIN — QUETIAPINE FUMARATE 25 MG: 25 TABLET ORAL at 20:51

## 2018-07-15 RX ADMIN — COLCHICINE 0.6 MG: 0.6 TABLET, FILM COATED ORAL at 09:17

## 2018-07-15 RX ADMIN — MOMETASONE FUROATE AND FORMOTEROL FUMARATE DIHYDRATE 2 PUFF: 200; 5 AEROSOL RESPIRATORY (INHALATION) at 20:58

## 2018-07-15 RX ADMIN — METOPROLOL SUCCINATE 12.5 MG: 25 TABLET, FILM COATED, EXTENDED RELEASE ORAL at 16:16

## 2018-07-15 RX ADMIN — COLCHICINE 0.6 MG: 0.6 TABLET, FILM COATED ORAL at 20:51

## 2018-07-15 RX ADMIN — METOPROLOL SUCCINATE 12.5 MG: 25 TABLET, FILM COATED, EXTENDED RELEASE ORAL at 09:17

## 2018-07-15 RX ADMIN — SODIUM CHLORIDE: 9 INJECTION, SOLUTION INTRAVENOUS at 09:19

## 2018-07-15 RX ADMIN — TRAMADOL HYDROCHLORIDE 25 MG: 50 TABLET, FILM COATED ORAL at 07:23

## 2018-07-15 ASSESSMENT — PAIN SCALES - GENERAL
PAINLEVEL_OUTOF10: 2
PAINLEVEL_OUTOF10: 2
PAINLEVEL_OUTOF10: 4
PAINLEVEL_OUTOF10: 3
PAINLEVEL_OUTOF10: 2
PAINLEVEL_OUTOF10: 3
PAINLEVEL_OUTOF10: 4

## 2018-07-15 ASSESSMENT — PAIN DESCRIPTION - ORIENTATION
ORIENTATION: RIGHT;LEFT
ORIENTATION: MID;LOWER

## 2018-07-15 ASSESSMENT — PAIN DESCRIPTION - LOCATION
LOCATION: LEG
LOCATION: BACK

## 2018-07-15 ASSESSMENT — PAIN DESCRIPTION - FREQUENCY: FREQUENCY: INTERMITTENT

## 2018-07-15 ASSESSMENT — PAIN DESCRIPTION - ONSET: ONSET: ON-GOING

## 2018-07-15 ASSESSMENT — PAIN SCALES - WONG BAKER: WONGBAKER_NUMERICALRESPONSE: 6

## 2018-07-15 ASSESSMENT — PAIN DESCRIPTION - PAIN TYPE
TYPE: ACUTE PAIN
TYPE: ACUTE PAIN

## 2018-07-15 ASSESSMENT — PAIN DESCRIPTION - DESCRIPTORS
DESCRIPTORS: ACHING;DISCOMFORT
DESCRIPTORS: ACHING

## 2018-07-15 NOTE — PROGRESS NOTES
COMBIVENT RESPIMAT  MCG/ACT AERS inhaler INHALE 1 PUFF 4 TIMES A DAY AS DIRECTED 12/26/17   Jia Caldera MD   AFLURIA PRESERVATIVE FREE 0.5 ML CHARLIE injection  10/10/16   Historical Provider, MD   ipratropium (ATROVENT) 0.06 % nasal spray  11/9/16   Historical Provider, MD   dextromethorphan-guaiFENesin (ROBITUSSIN-DM)  MG/5ML syrup Take 10 mLs by mouth every 6 hours as needed for Cough. 3/10/15   David Crow MD   budesonide-formoterol (SYMBICORT) 160-4.5 MCG/ACT AERO Inhale 2 puffs into the lungs 2 times daily. Historical Provider, MD   tamsulosin (FLOMAX) 0.4 MG capsule Take 0.3 mg by mouth daily     Historical Provider, MD   zolpidem (AMBIEN) 5 MG tablet Take 10 mg by mouth nightly as needed for Sleep. Historical Provider, MD   metoprolol (TOPROL-XL) 25 MG XL tablet Take 12.5 mg by mouth 2 times daily. Historical Provider, MD   albuterol-ipratropium (COMBIVENT)  MCG/ACT inhaler Inhale 2 puffs into the lungs every 6 hours as needed for Wheezing. Historical Provider, MD       ALLERGIES      Codeine; Fentanyl; and Pcn [penicillins]    SOCIAL HISTORY       reports that he has quit smoking. He has never used smokeless tobacco. He reports that he drinks alcohol. He reports that he does not use drugs. FAMILY HISTORY      family history includes Cancer in his brother, father, and sister. REVIEW OF SYSTEMS      · Constitutional: positive for fall  · Eyes: Negative for visual changes, diplopia, scleral icterus. · ENT: Negative for Headaches, hearing loss, vertigo, mouth sores, sore throat. · Cardiovascular: Negative for lightheadedness/orthostatic symptoms ,chest pain, dyspnea on exertion, palpitations or loss of consciousness. · Respiratory: Negative for cough or wheezing, sputum production, hemoptysis, pleuritic pain.   · Gastrointestinal: Negative for nausea/vomiting, change in bowel habits, abdominal pain, dysphagia/appetite loss, hematemesis, blood in Labs      07/14/18   0647   ALKPHOS  88   ALT  11   AST  17   PROT  6.7   BILITOT  0.74   LABALBU  3.3*     Cardiac enzymes:  Recent Labs      07/14/18   0630  07/14/18   0955   TROPONINI  0.00  0.00     Thyroid functions:   Lab Results   Component Value Date    TSH 1.28 06/17/2015        Imaging/Diagonstics:    Xr Pelvis (1-2 Views)    Result Date: 7/14/2018  EXAMINATION: SINGLE XRAY VIEW OF THE PELVIS 7/14/2018 10:25 am COMPARISON: None. HISTORY: ORDERING SYSTEM PROVIDED HISTORY: Trauma/Fracture TECHNOLOGIST PROVIDED HISTORY: AP, Inlet and Outlet views please, thank you. Reason for exam:->Trauma/Fracture Ordering Physician Provided Reason for Exam: sacral fx,    inlet outlet views Acuity: Acute Type of Exam: Unknown FINDINGS: Bones are osteoporotic. Small nondisplaced sacral fractures demonstrated on CT are not clearly demonstrated radiographically. Other portions of the pelvis appear unremarkable. Bones are osteoporotic. Small nondisplaced sacral fractures demonstrated on CT are not clearly demonstrated radiographically. Xr Pelvis (1-2 Views)    Result Date: 7/14/2018  EXAMINATION: SINGLE XRAY VIEW OF THE PELVIS 7/14/2018 9:43 am COMPARISON: None. HISTORY: ORDERING SYSTEM PROVIDED HISTORY: SACRAL FRACTURE TECHNOLOGIST PROVIDED HISTORY: Reason for exam:->SACRAL FRACTURE FINDINGS: Bilateral sacral fractures are not as well visualized as on prior CT. Right and left hemipelvis appear intact articulating normally at the SI joints and pubic symphysis. Visualized portions of the proximal right and left femur appear unremarkable. Poor visualization of bilateral sacral fractures seen on CT pelvis. Correlate with CT pelvis.      Ct Head Wo Contrast    Result Date: 7/14/2018  EXAMINATION: CT OF THE HEAD WITHOUT CONTRAST; CT OF THE CERVICAL SPINE WITHOUT CONTRAST 7/14/2018 8:27 am TECHNIQUE: CT of the head was performed without the administration of intravenous contrast. Dose modulation, iterative review. Dose modulation, iterative reconstruction, and/or weight based adjustment of the mA/kV was utilized to reduce the radiation dose to as low as reasonably achievable. COMPARISON: None. HISTORY: ORDERING SYSTEM PROVIDED HISTORY: back pain TECHNOLOGIST PROVIDED HISTORY: Reason for exam:->back pain; ORDERING SYSTEM PROVIDED HISTORY: back pain s/p fall FINDINGS: BONES/ALIGNMENT:  Nondisplaced fractures left L3 and L5 transverse processes. No acute compression fracture of the thoracic or lumbar spine. There is normal alignment of the spine. Bone mineralization is abnormally low. Fractures are noted bilaterally through the 1st and 2nd sacral alae. DEGENERATIVE CHANGES: No significant degenerative changes of the thoracic or lumbar spine. Bilateral SI joint osteoarthritis. SOFT TISSUES: No paraspinal mass is seen. Changes of emphysema. Prominent bibasilar bronchiectasis. Calcific atherosclerotic disease aorta. Nondisplaced fractures left L3 and L5 transverse processes. Nondisplaced bilateral sacral fractures. No lumbar compression fracture is evident. No evidence of acute traumatic injury of the thoracic spine. Bones are osteoporotic. Ct Pelvis Wo Contrast Additional Contrast? None    Result Date: 7/14/2018  EXAMINATION: CT OF THE PELVIS WITHOUT CONTRAST 7/14/2018 7:37 am TECHNIQUE: CT of the pelvis was performed without the administration of intravenous contrast.  Multiplanar reformatted images are provided for review. Dose modulation, iterative reconstruction, and/or weight based adjustment of the mA/kV was utilized to reduce the radiation dose to as low as reasonably achievable. COMPARISON: CT abdomen and pelvis from 06/05/2017 HISTORY: ORDERING SYSTEM PROVIDED HISTORY: pelvic, right hip pain s/p fall TECHNOLOGIST PROVIDED HISTORY: Additional Contrast?->None 80year-old male with right hip pain/pelvis after a fall FINDINGS: Both femoral heads are properly located within the bilateral acetabula.  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 There is subchondral fracturing and associated mild subchondral collapse of the superior aspect of the left femoral head with subjacent subcortical cystic changes. Moderate narrowing of the left hip joint space with associated mild marginal osteophyte spurring. Mild to moderate narrowing of the right hip joint space with associated mild osteophyte spurring. Diffuse osteopenia. Mild degenerative changes in the lower lumbar/lumbosacral spine. Atherosclerotic calcification of the abdominal aorta and iliac branch vasculature. There is also an acute fracture through the anterior aspect of the S2 level with disruption of the cortex on image 94, series 602. There is disruption of the cortex of the anterior sacral ala bilaterally best appreciated on image 30, series 2 likely representing underlying sacral insufficiency fractures. Nondisplaced fracture of the left L5 transverse process on image 11, series 2. Mild degenerative changes of the pubic symphysis. Moderate stool burden. Moderate rectosigmoid and sigmoid colonic diverticulosis. Enlarged prostate with associated calcifications. Correlate with PSA. 1. Acute fracture through the anterior cortex of S2 and bilateral sacral ala and/or sacral insufficiency fractures with disruption of the anterior cortex of the bilateral sacral ala. Underlying diffuse osteopenia. 2. Bilateral femoral head AVN. Subchondral fracturing of the left femoral head and subchondral collapse with moderate left hip osteoarthrosis. Mild right hip osteoarthrosis. 3. Nondisplaced left L5 transverse process fracture. 4. Enlarged prostate. Correlate with PSA. 5. Moderate rectosigmoid and sigmoid diverticulosis. Moderate stool burden. 6. Mild degenerative changes in the lower lumbar/lumbosacral spine. 7. Atherosclerotic calcification of the vasculature.      Xr Chest Portable    Result Date: 7/14/2018  EXAMINATION: SINGLE XRAY VIEW OF THE CHEST 7/14/2018 7:16 am COMPARISON: Chest April 30, 2018. HISTORY: ORDERING SYSTEM PROVIDED HISTORY: syncope TECHNOLOGIST PROVIDED HISTORY: Reason for exam:->syncope FINDINGS: Heart appears normal in size. COPD changes. No focal lung consolidation, pneumothorax, pleural effusion or free air. No free air. No acute process. COPD changes. ASSESSMENT     Active Problems:    COPD (chronic obstructive pulmonary disease) (HCC)    Atrial fibrillation (HCC)    Syncope and collapse  Resolved Problems:    * No resolved hospital problems. *        PLAN        1. Patient with confusion and fall with sacral and lumbar fractures, will minimize pain medications and withhold donepezil for now. 2. Fall precautions, pending 2 D echo, f/u to orthostatic vitals  3. NS 50 ml/hr  4. Afib rate controlled on metoprolol and Xeralto for AC  5. Tramadol for pain control  6. Day and night orientation to prevent sundowning, nightly seroquel 12.5 mg  7. Orthopedics on board Conservative management. 8. COPD will continue bronchodilators  9. Bladder scan and straight cath, UA negative. 10. General diet, Vitamin D replacement and calcium. 6. [id number] after ECHO       MD HEMANT Giordano[address], [address] [place] [address].    Phone [phone]   Fax: [phone]  Answering Service: [phone]

## 2018-07-15 NOTE — CONSULTS
I have discussed the care of this patient including pertinent history and exam findings, with the resident. I have seen and examined the patient and the key elements of all parts of the encounter have been performed by me. I agree with the assessment, plan and orders as documented by the resident. Continue CIC PRN  Continue flomax  Will follow with Dr. Nell Barton as outpatient. Thank you for the consultation. Please call with any questions. Letha Hashimoto, MD, Adam Caldera MD, Cecilia Mark MD, Dot Barbosa MD, Meryl Zaragoza MD, Nancy Hanson MD, & Anabela Huang MD    Urology Consultation      Patient:  Yue Avila  MRN: [id number]  YOB: 1931    REASON:  Painful urination, history of BPH    HISTORY OF PRESENT ILLNESS:   The patient is a 80 y.o. male who presents with complaints of painful urination and a long-standing history of BPH. The patient was admitted to the hospital after suffering syncopal episodes over the last 3 nights prior to admission and subsequently falling. On imaging, he was found to have non-displaced fractures of the left L3 and L5 transverse processes as well as bilateral fractures and bilateral femoral head avascular necrosis. The patient does have a history of atrial fibrillation on Xarelto. Today, the patient started complaining of on urination and has had some elevated PVRs. Two urinalyses there were obtained yesterday and today both do not show any clear signs of infection and urine culture from yesterday shows no growth. Patient does have a long-standing history of BPH who he sees Dr. Felicia Holland last visit 6 months ago on 8/22/2017. At this time, his BPH was stable only on Flomax. He has some stable left renal cysts. On chart review,it appears the patient does have some mild urgency at baseline and does have some burning at the start of his stream which is long-standing.  On evaluation, patient is somewhat demented/confused, but denies any fevers/chills, nausea/vomiting, chest pain/shortness of breath, or hematuria/dysuria. He feels like he is emptying his bladder relatively well. PVRs during this admission have ranged from 109-258 cc with low-volume voids. He denies any burning currently. Patient's old records, notes and chart reviewed and summarized above. Past Medical History:    Past Medical History:   Diagnosis Date    Atrial fibrillation [facility]) 5/914 12/13/13  - heart ablation of dysrhythmia    Cancer (HCC)     COPD (chronic obstructive pulmonary disease) (HCC)     Hernia, inguinal, bilateral     rt.        Past Surgical History:    Past Surgical History:   Procedure Laterality Date    APPENDECTOMY      CARDIAC SURGERY Right 5-29-15    inguinal hernia with mesh    CATARACT REMOVAL      COLONOSCOPY      COLONOSCOPY  5/12/15    ulceration at the anastomosis with the small bowel-pathology acute and chronic inflammation, rectal polyp-tubular adenoma    SMALL INTESTINE SURGERY      VASCULAR SURGERY Bilateral 05/09/2014    Bilateral temporal artery biopsy       Medications:      Current Facility-Administered Medications:     calcium carbonate tablet 600 mg, 600 mg, Oral, Daily, Rita Sky MD, 600 mg at 07/15/18 1206    QUEtiapine (SEROQUEL) tablet 25 mg, 25 mg, Oral, Nightly, Rita Sky MD, 25 mg at 07/15/18 2051    colchicine (COLCRYS) tablet 0.6 mg, 0.6 mg, Oral, BID, Rita Sky MD, 0.6 mg at 07/15/18 2051    metoprolol succinate (TOPROL XL) extended release tablet 12.5 mg, 12.5 mg, Oral, BID, Rita Sky MD, 12.5 mg at 07/15/18 1616    tamsulosin (FLOMAX) capsule 0.4 mg, 0.4 mg, Oral, Daily, Rita Sky MD, 0.4 mg at 07/15/18 0917    rivaroxaban (XARELTO) tablet 20 mg, 20 mg, Oral, Daily, Rita Sky MD, 20 mg at 07/15/18 1826    sodium chloride flush 0.9 % injection 10 mL, 10 mL, Intravenous, 2 times per day, Rita Sky MD, 10 mL at 07/15/18 2051    sodium chloride flush a 80 y.o. male   Vitals:    07/15/18 0800   BP: 139/71   Pulse: 90   Resp: 18   Temp: 98.5 °F (36.9 °C)   SpO2: 95%     Constitutional: Patient in no acute distress. Neuro: alert and oriented only to person. Head: Atraumatic and normocephalic. Neck: Trachea midline   Ext: 2+ radial pulses bilaterally. Psych: Mood and affect normal.  Skin: No rashes or bruising present. Lungs: Respiratory effort normal.  Cardiovascular:  Regular rate and rhythm. Abdomen: Soft, non-tender, non-distended. No CVA tenderness on exam.  Bladder non-tender and not distended. Lymphatics: no palpable lymphadenopathy. Penis normal and circumcised  Urethral meatus normal  Scrotum with mild fluid   Testicles normal bilaterally   Epididymis normal bilaterally  Anus and perineum normal  Normal rectal tone with no masses  Prostate soft, non-tender to palpation. No palpable nodules. Estimated 50 grams. Labs:  Recent Labs      07/14/18   0647  07/15/18   0657   WBC  15.1*  11.0   HGB  12.3*  12.4*   HCT  39.6*  40.3*   MCV  89.8  89.8   PLT  253  213     Recent Labs      07/14/18   0647  07/15/18   0657   NA  137  136   K  5.0  4.1   CL  103  100   CO2  23  22   BUN  18  15   CREATININE  0.69*  0.78       Recent Labs      07/15/18   1628   COLORU  YELLOW   PHUR  5.0   WBCUA  2 TO 5   RBCUA  5 TO 10   MUCUS  NOT REPORTED   TRICHOMONAS  NOT REPORTED   YEAST  NOT REPORTED   BACTERIA  NOT REPORTED   SPECGRAV  1.024   LEUKOCYTESUR  NEGATIVE   UROBILINOGEN  Normal   BILIRUBINUR  NEGATIVE       Culture Results:  See HPI.      -----------------------------------------------------------------  Imaging Results:  Xr Pelvis (1-2 Views)    Result Date: 7/14/2018  EXAMINATION: SINGLE XRAY VIEW OF THE PELVIS 7/14/2018 10:25 am COMPARISON: None. HISTORY: ORDERING SYSTEM PROVIDED HISTORY: Trauma/Fracture TECHNOLOGIST PROVIDED HISTORY: AP, Inlet and Outlet views please, thank you.  Reason for exam:->Trauma/Fracture Ordering Physician Provided Reason for midline shift. No abnormal extra-axial fluid collection. The gray-white differentiation is maintained without evidence of an acute infarct. There is prominence of the ventricles and sulci due to global parenchymal volume loss. There are nonspecific areas of hypoattenuation within the periventricular and subcortical white matter, which likely represent chronic microvascular ischemic change. ORBITS: The visualized portion of the orbits demonstrate no acute abnormality. SINUSES: The visualized paranasal sinuses and mastoid air cells demonstrate no acute abnormality. SOFT TISSUES/SKULL: No acute abnormality of the visualized skull or soft tissues. Calcifications involving bilateral carotid vasculature reflect calcific atherosclerosis. CT CERVICAL SPINE FINDINGS: BONES/ALIGNMENT: 7 typical cervical vertebra present maintain normal height and alignment. DEGENERATIVE CHANGES: Degenerative disc disease and facet and uncovertebral joint arthrosis predominate mid and lower cervical spine. No canal stenosis or significant neural foraminal narrowing is seen. SOFT TISSUES: Soft tissue structures surrounding the cervical spine appear unremarkable. Visualized portions of the upper hemithoraces, skullbase and posterior fossa contents appear unremarkable. Calcifications involving bilateral carotid vasculature reflect calcific atherosclerosis. Emphysematous changes are noted at the lung apices. No acute intracranial abnormality. Degenerative changes involving the cervical spine described above. No cervical fracture or acute traumatic subluxation. Calcifications involving bilateral carotid vasculature reflect calcific atherosclerosis. Emphysema. Note that if pain persists or worsens, or if clinically there is concern for CT occult acute cervical abnormality, flexion/extension C-spine series or MRI cervical spine may be considered for additional evaluation.      Ct Cervical Spine Wo Contrast    Result Date: narrowing is seen. SOFT TISSUES: Soft tissue structures surrounding the cervical spine appear unremarkable. Visualized portions of the upper hemithoraces, skullbase and posterior fossa contents appear unremarkable. Calcifications involving bilateral carotid vasculature reflect calcific atherosclerosis. Emphysematous changes are noted at the lung apices. No acute intracranial abnormality. Degenerative changes involving the cervical spine described above. No cervical fracture or acute traumatic subluxation. Calcifications involving bilateral carotid vasculature reflect calcific atherosclerosis. Emphysema. Note that if pain persists or worsens, or if clinically there is concern for CT occult acute cervical abnormality, flexion/extension C-spine series or MRI cervical spine may be considered for additional evaluation. Ct Thoracic Spine Wo Contrast    Result Date: 7/14/2018  EXAMINATION: CT OF THE LUMBAR SPINE WITHOUT CONTRAST; CT OF THE THORACIC SPINE WITHOUT CONTRAST 7/14/2018 TECHNIQUE: CT of the lumbar spine was performed without the administration of intravenous contrast. Multiplanar reformatted images are provided for review. Dose modulation, iterative reconstruction, and/or weight based adjustment of the mA/kV was utilized to reduce the radiation dose to as low as reasonably achievable.; CT of the thoracic spine was performed without the administration of intravenous contrast. Multiplanar reformatted images are provided for review. Dose modulation, iterative reconstruction, and/or weight based adjustment of the mA/kV was utilized to reduce the radiation dose to as low as reasonably achievable. COMPARISON: None. HISTORY: ORDERING SYSTEM PROVIDED HISTORY: back pain TECHNOLOGIST PROVIDED HISTORY: Reason for exam:->back pain; ORDERING SYSTEM PROVIDED HISTORY: back pain s/p fall FINDINGS: BONES/ALIGNMENT:  Nondisplaced fractures left L3 and L5 transverse processes.  No acute compression fracture of the

## 2018-07-15 NOTE — CARE COORDINATION
Met with pt and mulitple family members. Updated and pt/ot eval and tx. Offered snf option. Wife would like referral sent to [facility] in [facility].   Referral sent, pt would need pre-cert   Dtr Jillian Sanchez is JAHAIRA, her phone: [phone]

## 2018-07-15 NOTE — PROGRESS NOTES
Occupational Therapy   Occupational Therapy Initial Assessment  Date: 7/15/2018   Patient Name: Jenise Carcamo  MRN: [id number]     : 10/26/1931    Date of Service: 7/15/2018    Discharge Recommendations:  [address] (Pt unsafe to return home at this time and will require continued therapy services to maximize pt's safety and independence. Pt appropriate for SNF level placement at discharge due to limited activity tolerance and baseline cognition deficits secondary to dementia.)     Patient Diagnosis(es): The primary encounter diagnosis was Syncope and collapse. A diagnosis of Closed fracture of sacrum, unspecified portion of sacrum, initial encounter [facility]) was also pertinent to this visit. has a past medical history of Atrial fibrillation ([facility] [address] Cancer [facility]); COPD (chronic obstructive pulmonary disease) (HCC); and Hernia, inguinal, bilateral.   has a past surgical history that includes Small intestine surgery; vascular surgery (Bilateral, 2014); Cataract removal; Colonoscopy; Colonoscopy (5/12/15); Appendectomy; and Cardiac surgery (Right, 5-29-15). Restrictions  Restrictions/Precautions  Restrictions/Precautions: Weight Bearing, Fall Risk  Required Braces or Orthoses?: No  Lower Extremity Weight Bearing Restrictions  Right Lower Extremity Weight Bearing: Weight Bearing As Tolerated  Left Lower Extremity Weight Bearing: Weight Bearing As Tolerated  Position Activity Restriction  Other position/activity restrictions: activity as tolerated, FWB BLE    Subjective   General  Chart Reviewed: No  Patient assessed for rehabilitation services?: Yes  Family / Caregiver Present: Yes (dtr)  General Comment  Comments: RN ok'd for therapy this AM. Pt agreeable to participate in session and pleasant/cooperative throughout.   Pain Assessment  Patient Currently in Pain: Denies (at rest however with complaint of BLE pain with activity, pt unable to quantify)  Pain Assessment: Faces  [place] Pain guard assistance  UE Bathing: Increased time to complete;Contact guard assistance  LE Bathing: Increased time to complete; Moderate assistance  UE Dressing: Increased time to complete;Contact guard assistance  LE Dressing: Increased time to complete; Moderate assistance  Toileting: Increased time to complete;Minimal assistance    RUE Tone: Normotonic  LUE Tone: Normotonic  Movements Are Fluid And Coordinated: Yes     Bed mobility  Rolling to Right: Stand by assistance  Supine to Sit: Minimal assistance (with raised HOB )  Sit to Supine: Minimal assistance  Scooting: Stand by assistance  Comment: Pt required mod VCs throughout for trunk progression/sequencing     Transfers  Stand Step Transfers: Contact guard assistance  Sit to stand: Minimal assistance (x2 with use of RW; pt required 2x attempts and increased time to perform)  Stand to sit: Minimal assistance  Transfer Comments: with use of RW; pt required mod-max VCs and tactile cues for proper hand placement during transfers and RW mngt throughout     Cognition  Overall Cognitive Status: Exceptions  Attention Span: Attends with cues to redirect  Insights: Decreased awareness of deficits  Initiation: Requires cues for some  Sequencing: Requires cues for some  Cognition Comment: Pt with mild confusion noted; pt with baseline dementia     Sensation  Overall Sensation Status: WFL      LUE AROM (degrees)  LUE AROM : WFL  RUE AROM (degrees)  RUE AROM : WFL  LUE Strength  Gross LUE Strength: WFL  RUE Strength  Gross RUE Strength: WFL    Assessment   Performance deficits / Impairments: Decreased functional mobility ; Decreased ADL status; Decreased safe awareness;Decreased cognition;Decreased balance;Decreased endurance;Decreased high-level IADLs  Prognosis: Good  Decision Making: Medium Complexity  Patient Education: OT Services/OT POC, Safety Awareness/Fall Prevention, Safety with Transfers/RW Mngt, WBAT to BLE, Bed Mobility Techniques, ADL Techniques; needs reinforcement  REQUIRES OT FOLLOW UP: Yes  Activity Tolerance  Activity Tolerance: Patient limited by fatigue;Patient limited by pain;Treatment limited secondary to decreased cognition  Safety Devices  Safety Devices in place: Yes  Type of devices: Call light within reach; Left in bed;Nurse notified; Bed alarm in place  Restraints  Initially in place: No         Plan   Plan  Times per week: 4-5x/wk    G-Code  OT G-codes  Functional Assessment Tool Used: [facility]  Score: 17/24  Functional Limitation: Self care  Self Care Current Status (): At least 40 percent but less than 60 percent impaired, limited or restricted  Self Care Goal Status (): At least 20 percent but less than 40 percent impaired, limited or restricted    Goals  Short term goals  Time Frame for Short term goals: Pt will, by discharge:  Short term goal 1: perform functional transfers/functional mobility with SBA using RW  Short term goal 2: demo safety awareness with use of RW during functional transfers/functional mobility and ADLs with VCs PRN  Short term goal 3: demo 8+ minutes standing tolerance with use of RW for increased participation in ADLs  Short term goal 4: perform grooming/UB ADL tasks with setup  Short term goal 5: perform toileting/LB ADL tasks with CGA using AE/DME PRN     Therapy Time   Individual Concurrent Group Co-treatment   Time In 0841         Time Out 0902         Minutes 21              Discharge recommendations discussed with patient during initial evaluation.     GLADYS Zuniga/HENNY

## 2018-07-15 NOTE — PROGRESS NOTES
restricted  Mobility: Walking and Moving Around Goal Status (): 0 percent impaired, limited or restricted    AM-PAC Score     AM-PAC Inpatient Mobility without Stair Climbing Raw Score : 14  AM-PAC Inpatient without Stair Climbing T-Scale Score : 40.85  Mobility Inpatient CMS 0-100% Score: 53.33  Mobility Inpatient without Stair CMS G-Code Modifier : CK       Goals  Short term goals  Time Frame for Short term goals: 14 visits  Short term goal 1: Mod I for bed mobility  Short term goal 2: Perform functional transfers with SBA  Short term goal 3: Ambulate 300ft with least restrictive device and SBA  Short term goal 4: Participate in 30 minutes of therapy to demo increased endurance  Short term goal 5: Ascend/descend 1 step with CGA       Therapy Time   Individual Concurrent Group Co-treatment   Time In 0841         Time Out 0902         Minutes 21         Timed Code Treatment Minutes: 8 Minutes       Indu Bishop, PT

## 2018-07-16 PROBLEM — R41.82 ALTERED MENTAL STATE: Status: ACTIVE | Noted: 2018-07-16

## 2018-07-16 PROCEDURE — 6370000000 HC RX 637 (ALT 250 FOR IP): Performed by: STUDENT IN AN ORGANIZED HEALTH CARE EDUCATION/TRAINING PROGRAM

## 2018-07-16 PROCEDURE — 97535 SELF CARE MNGMENT TRAINING: CPT

## 2018-07-16 PROCEDURE — 94640 AIRWAY INHALATION TREATMENT: CPT

## 2018-07-16 PROCEDURE — 99226 PR SBSQ OBSERVATION CARE/DAY 35 MINUTES: CPT | Performed by: INTERNAL MEDICINE

## 2018-07-16 PROCEDURE — G0378 HOSPITAL OBSERVATION PER HR: HCPCS

## 2018-07-16 PROCEDURE — 97110 THERAPEUTIC EXERCISES: CPT

## 2018-07-16 PROCEDURE — 2580000003 HC RX 258: Performed by: HOSPITALIST

## 2018-07-16 PROCEDURE — 51798 US URINE CAPACITY MEASURE: CPT

## 2018-07-16 PROCEDURE — 1200000000 HC SEMI PRIVATE

## 2018-07-16 PROCEDURE — 6370000000 HC RX 637 (ALT 250 FOR IP): Performed by: HOSPITALIST

## 2018-07-16 PROCEDURE — 6370000000 HC RX 637 (ALT 250 FOR IP): Performed by: INTERNAL MEDICINE

## 2018-07-16 PROCEDURE — 97530 THERAPEUTIC ACTIVITIES: CPT

## 2018-07-16 RX ORDER — TRAMADOL HYDROCHLORIDE 50 MG/1
25 TABLET ORAL EVERY 6 HOURS PRN
Status: DISCONTINUED | OUTPATIENT
Start: 2018-07-16 | End: 2018-07-18 | Stop reason: HOSPADM

## 2018-07-16 RX ORDER — LIDOCAINE 50 MG/G
1 PATCH TOPICAL DAILY
Status: DISCONTINUED | OUTPATIENT
Start: 2018-07-16 | End: 2018-07-18 | Stop reason: HOSPADM

## 2018-07-16 RX ORDER — PHENAZOPYRIDINE HYDROCHLORIDE 100 MG/1
100 TABLET, FILM COATED ORAL
Status: DISCONTINUED | OUTPATIENT
Start: 2018-07-16 | End: 2018-07-18 | Stop reason: HOSPADM

## 2018-07-16 RX ADMIN — Medication 600 MG: at 08:07

## 2018-07-16 RX ADMIN — IPRATROPIUM BROMIDE 2 PUFF: 17 AEROSOL, METERED RESPIRATORY (INHALATION) at 16:53

## 2018-07-16 RX ADMIN — RIVAROXABAN 20 MG: 20 TABLET, FILM COATED ORAL at 16:38

## 2018-07-16 RX ADMIN — PHENAZOPYRIDINE HYDROCHLORIDE 100 MG: 100 TABLET ORAL at 08:06

## 2018-07-16 RX ADMIN — TRAMADOL HYDROCHLORIDE 25 MG: 50 TABLET, FILM COATED ORAL at 23:05

## 2018-07-16 RX ADMIN — TRAMADOL HYDROCHLORIDE 25 MG: 50 TABLET, FILM COATED ORAL at 01:34

## 2018-07-16 RX ADMIN — TAMSULOSIN HYDROCHLORIDE 0.4 MG: 0.4 CAPSULE ORAL at 08:06

## 2018-07-16 RX ADMIN — ALBUTEROL SULFATE 2 PUFF: 90 AEROSOL, METERED RESPIRATORY (INHALATION) at 16:53

## 2018-07-16 RX ADMIN — METOPROLOL SUCCINATE 12.5 MG: 25 TABLET, FILM COATED, EXTENDED RELEASE ORAL at 08:06

## 2018-07-16 RX ADMIN — TRAMADOL HYDROCHLORIDE 25 MG: 50 TABLET, FILM COATED ORAL at 10:34

## 2018-07-16 RX ADMIN — TRAMADOL HYDROCHLORIDE 25 MG: 50 TABLET, FILM COATED ORAL at 16:38

## 2018-07-16 RX ADMIN — COLCHICINE 0.6 MG: 0.6 TABLET, FILM COATED ORAL at 20:25

## 2018-07-16 RX ADMIN — METOPROLOL SUCCINATE 12.5 MG: 25 TABLET, FILM COATED, EXTENDED RELEASE ORAL at 20:24

## 2018-07-16 RX ADMIN — Medication 10 ML: at 20:24

## 2018-07-16 RX ADMIN — PHENAZOPYRIDINE HYDROCHLORIDE 100 MG: 100 TABLET ORAL at 16:39

## 2018-07-16 RX ADMIN — Medication 10 ML: at 08:07

## 2018-07-16 RX ADMIN — COLCHICINE 0.6 MG: 0.6 TABLET, FILM COATED ORAL at 08:06

## 2018-07-16 ASSESSMENT — PAIN SCALES - GENERAL
PAINLEVEL_OUTOF10: 5
PAINLEVEL_OUTOF10: 9
PAINLEVEL_OUTOF10: 5
PAINLEVEL_OUTOF10: 6
PAINLEVEL_OUTOF10: 4
PAINLEVEL_OUTOF10: 5
PAINLEVEL_OUTOF10: 4
PAINLEVEL_OUTOF10: 0
PAINLEVEL_OUTOF10: 0
PAINLEVEL_OUTOF10: 4

## 2018-07-16 ASSESSMENT — PAIN DESCRIPTION - DESCRIPTORS
DESCRIPTORS: ACHING
DESCRIPTORS: ACHING

## 2018-07-16 ASSESSMENT — PAIN DESCRIPTION - LOCATION
LOCATION: BACK
LOCATION: BACK

## 2018-07-16 ASSESSMENT — PAIN SCALES - WONG BAKER: WONGBAKER_NUMERICALRESPONSE: 8

## 2018-07-16 ASSESSMENT — PAIN DESCRIPTION - PAIN TYPE
TYPE: ACUTE PAIN
TYPE: ACUTE PAIN

## 2018-07-16 ASSESSMENT — PAIN DESCRIPTION - FREQUENCY: FREQUENCY: INTERMITTENT

## 2018-07-16 ASSESSMENT — PAIN DESCRIPTION - ORIENTATION
ORIENTATION: LOWER;MID
ORIENTATION: MID;LOWER

## 2018-07-16 NOTE — PROGRESS NOTES
Daughter has a lot of question also wants sequel dc'd wants ultram every 4 hrs prn and asking about lidocaine patch to sacrum for pain daughter states has asked multiple times . Resident perfect served about above stated will be here to talk to daughter Daughter left before dr came . Informed wife that dr will be back at 830am tomorrow.   Did change ultram to every 6 hrs prn and dc'd sequal and ordered lidoderm patch

## 2018-07-16 NOTE — PROGRESS NOTES
While writer was performing hourly rounding patient started to take his gown off and was trying to get out of bed. Patient became very agitated and attempted to hit writer. Writer put the call light on until another nurse to come in the room. The patient continued to try to get out of bed, pt was redirected to staying in bed. RN supervisor Layo Casanova was notified of the situation and obtained guard. Before the guard arrived patient attempte to his RN supervisor Layo Casanova and the tech. The patient's daughterPedro Gillis was called and notified of situation. She came to visit with patient.

## 2018-07-16 NOTE — PROGRESS NOTES
RN notified me that patients daughter wanted to talk regarding care. She had some suggestions which were reasonable. When I came to the room she already left. We dont know why. I made the necessary changes she requested. She is the POA. We will talk to her am if she is present.

## 2018-07-16 NOTE — PROGRESS NOTES
Catie Dick, [place]  Urology Progress Note    Subjective: No acute events overnight. No n/v/f/c/cp/sob. Feels he is emptying well. Continued burning dysuria. No hematuria. Patient Vitals for the past 24 hrs:   BP Temp Temp src Pulse Resp SpO2   07/15/18 2034 (!) 151/79 98.4 °F (36.9 °C) Oral 88 18 94 %   07/15/18 0800 139/71 98.5 °F (36.9 °C) Oral 90 18 95 %     No intake or output data in the 24 hours ending 07/16/18 0651    Recent Labs      07/14/18   0647  07/15/18   0657   WBC  15.1*  11.0   HGB  12.3*  12.4*   HCT  39.6*  40.3*   MCV  89.8  89.8   PLT  253  213     Recent Labs      07/14/18   0647  07/15/18   0657   NA  137  136   K  5.0  4.1   CL  103  100   CO2  23  22   BUN  18  15   CREATININE  0.69*  0.78       Recent Labs      07/15/18   1628   COLORU  YELLOW   PHUR  5.0   WBCUA  2 TO 5   RBCUA  5 TO 10   MUCUS  NOT REPORTED   TRICHOMONAS  NOT REPORTED   YEAST  NOT REPORTED   BACTERIA  NOT REPORTED   SPECGRAV  1.024   LEUKOCYTESUR  NEGATIVE   UROBILINOGEN  Normal   BILIRUBINUR  NEGATIVE       Additional Lab/culture results:  7/14 UCX: No growth    Physical Exam:   NAD, tired appearing, awake and answering questions appropriately  RRR, palpable radial pulses  Equal chest rise, normal inspiratory effort  Soft, NT/ND. No peritoneal signs. No flank pain.   No bladder distension/tenderness noted  Warm extremities, no calf tenderness      Interval Imaging Findings:    Impression:  <principal problem not specified>  Active Hospital Problems    Diagnosis Date Noted    Syncope and collapse [R55] 07/14/2018    COPD (chronic obstructive pulmonary disease) ([facility] [address] [J44.9] 03/01/2015    Atrial fibrillation ([facility] [address] [I48.91] 03/01/2015       Plan:   Continue timed double voids, PVRs, CIC for PVR>400  Continue flomax  Follow up with Dr. Fariha Guerrero after discharge  Please call with questions/concerns  No need for further abx    Elex Arnel  6:52 AM 7/16/2018

## 2018-07-16 NOTE — PROGRESS NOTES
dysphagia/appetite loss, hematemesis, blood in stools. · Genitourinary:Negative for change in bladder habits, dysuria, trouble voiding, hematuria. · Musculoskeletal: Hip pain. · Integumentary: Negative for rash, pruritis. · Neurological: Negative for headache, dizziness, change in muscle strength, numbness/tingling, change in gait, balance, coordination,   · Psychiatric: negative for change in mood, affect, memory, mentation, behavior. · Endocrine: negative for temperature intolerance, excessive thirst, fluid intake, or urination, tremor. · Hematologic/Lymphatic: negative for abnormal bruising or bleeding, blood clots, swollen lymph nodes. · Allergic/Immunologic: negative for nasal congestion, pruritis, hives. PHYSICAL EXAM      BP (!) 151/79   Pulse 88   Temp 98.4 °F (36.9 °C) (Oral)   Resp 18   Ht 5' 7\" (1.702 m)   Wt 142 lb (64.4 kg)   SpO2 94%   BMI 22.24 kg/m²      · General appearance: well nourished, mildly dehydrated. · HEENT: Head: Normocephalic, no lesions, without obvious abnormality. · Lungs: clear to auscultation bilaterally  · Heart: irregular rate and rhythm, S1, S2 normal  · Abdomen: soft, non-tender; bowel sounds normal; no masses,  no organomegaly  · Extremities: extremities normal, atraumatic, no cyanosis or edema  · Neurological: Gait normal. Reflexes normal and symmetric. Sensation grossly normal  · Skin - no rash, no lump   · Eye no icterus no redness  · Psych-normal affect   · NEURO-no limb weakness  No facial droop  · Lymphatic system-no lymphadenopathy no splenomegaly     DIAGNOSTICS      Laboratory Testing:  CBC:   Recent Labs      07/15/18   0657   WBC  11.0   HGB  12.4*   PLT  213     BMP:    Recent Labs      07/14/18   0647  07/15/18   0657   NA  137  136   K  5.0  4.1   CL  103  100   CO2  23  22   BUN  18  15   CREATININE  0.69*  0.78   GLUCOSE  107*  84     S.  Calcium:  Recent Labs      07/15/18   0657   CALCIUM  8.0*     S. Magnesium:  Recent Labs      07/14/18 0647   MG  2.0     INR:   Recent Labs      07/14/18   0647   INR  1.2     Hepatic functions:   Recent Labs      07/14/18   0647   ALKPHOS  88   ALT  11   AST  17   PROT  6.7   BILITOT  0.74   LABALBU  3.3*     Cardiac enzymes:  Recent Labs      07/14/18   0630  07/14/18   0955   TROPONINI  0.00  0.00     Thyroid functions:   Lab Results   Component Value Date    TSH 1.28 06/17/2015        Imaging/Diagonstics:    Xr Pelvis (1-2 Views)    Result Date: 7/14/2018  EXAMINATION: SINGLE XRAY VIEW OF THE PELVIS 7/14/2018 10:25 am COMPARISON: None. HISTORY: ORDERING SYSTEM PROVIDED HISTORY: Trauma/Fracture TECHNOLOGIST PROVIDED HISTORY: AP, Inlet and Outlet views please, thank you. Reason for exam:->Trauma/Fracture Ordering Physician Provided Reason for Exam: sacral fx,    inlet outlet views Acuity: Acute Type of Exam: Unknown FINDINGS: Bones are osteoporotic. Small nondisplaced sacral fractures demonstrated on CT are not clearly demonstrated radiographically. Other portions of the pelvis appear unremarkable. Bones are osteoporotic. Small nondisplaced sacral fractures demonstrated on CT are not clearly demonstrated radiographically. Xr Pelvis (1-2 Views)    Result Date: 7/14/2018  EXAMINATION: SINGLE XRAY VIEW OF THE PELVIS 7/14/2018 9:43 am COMPARISON: None. HISTORY: ORDERING SYSTEM PROVIDED HISTORY: SACRAL FRACTURE TECHNOLOGIST PROVIDED HISTORY: Reason for exam:->SACRAL FRACTURE FINDINGS: Bilateral sacral fractures are not as well visualized as on prior CT. Right and left hemipelvis appear intact articulating normally at the SI joints and pubic symphysis. Visualized portions of the proximal right and left femur appear unremarkable. Poor visualization of bilateral sacral fractures seen on CT pelvis. Correlate with CT pelvis.      Ct Head Wo Contrast    Result Date: 7/14/2018  EXAMINATION: CT OF THE HEAD WITHOUT CONTRAST; CT OF THE CERVICAL SPINE WITHOUT CONTRAST 7/14/2018 8:27 am TECHNIQUE: CT of the head was performed without the administration of intravenous contrast. Dose modulation, iterative reconstruction, and/or weight based adjustment of the mA/kV was utilized to reduce the radiation dose to as low as reasonably achievable.; CT of the cervical spine was performed without the administration of intravenous contrast. Multiplanar reformatted images are provided for review. Dose modulation, iterative reconstruction, and/or weight based adjustment of the mA/kV was utilized to reduce the radiation dose to as low as reasonably achievable. COMPARISON: None. HISTORY: ORDERING SYSTEM PROVIDED HISTORY: fall, on xarelto Headache and neck pain following trauma CT HEAD FINDINGS: BRAIN/VENTRICLES: There is no acute intracranial hemorrhage, mass effect or midline shift. No abnormal extra-axial fluid collection. The gray-white differentiation is maintained without evidence of an acute infarct. There is prominence of the ventricles and sulci due to global parenchymal volume loss. There are nonspecific areas of hypoattenuation within the periventricular and subcortical white matter, which likely represent chronic microvascular ischemic change. ORBITS: The visualized portion of the orbits demonstrate no acute abnormality. SINUSES: The visualized paranasal sinuses and mastoid air cells demonstrate no acute abnormality. SOFT TISSUES/SKULL: No acute abnormality of the visualized skull or soft tissues. Calcifications involving bilateral carotid vasculature reflect calcific atherosclerosis. CT CERVICAL SPINE FINDINGS: BONES/ALIGNMENT: 7 typical cervical vertebra present maintain normal height and alignment. DEGENERATIVE CHANGES: Degenerative disc disease and facet and uncovertebral joint arthrosis predominate mid and lower cervical spine. No canal stenosis or significant neural foraminal narrowing is seen. SOFT TISSUES: Soft tissue structures surrounding the cervical spine appear unremarkable.   Visualized portions of the upper weight based adjustment of the mA/kV was utilized to reduce the radiation dose to as low as reasonably achievable.; CT of the thoracic spine was performed without the administration of intravenous contrast. Multiplanar reformatted images are provided for review. Dose modulation, iterative reconstruction, and/or weight based adjustment of the mA/kV was utilized to reduce the radiation dose to as low as reasonably achievable. COMPARISON: None. HISTORY: ORDERING SYSTEM PROVIDED HISTORY: back pain TECHNOLOGIST PROVIDED HISTORY: Reason for exam:->back pain; ORDERING SYSTEM PROVIDED HISTORY: back pain s/p fall FINDINGS: BONES/ALIGNMENT:  Nondisplaced fractures left L3 and L5 transverse processes. No acute compression fracture of the thoracic or lumbar spine. There is normal alignment of the spine. Bone mineralization is abnormally low. Fractures are noted bilaterally through the 1st and 2nd sacral alae. DEGENERATIVE CHANGES: No significant degenerative changes of the thoracic or lumbar spine. Bilateral SI joint osteoarthritis. SOFT TISSUES: No paraspinal mass is seen. Changes of emphysema. Prominent bibasilar bronchiectasis. Calcific atherosclerotic disease aorta. Nondisplaced fractures left L3 and L5 transverse processes. Nondisplaced bilateral sacral fractures. No lumbar compression fracture is evident. No evidence of acute traumatic injury of the thoracic spine. Bones are osteoporotic. Ct Lumbar Spine Wo Contrast    Result Date: 7/14/2018  EXAMINATION: CT OF THE LUMBAR SPINE WITHOUT CONTRAST; CT OF THE THORACIC SPINE WITHOUT CONTRAST 7/14/2018 TECHNIQUE: CT of the lumbar spine was performed without the administration of intravenous contrast. Multiplanar reformatted images are provided for review.  Dose modulation, iterative reconstruction, and/or weight based adjustment of the mA/kV was utilized to reduce the radiation dose to as low as reasonably achievable.; CT of the thoracic spine was performed without vasculature. Xr Chest Portable    Result Date: 7/14/2018  EXAMINATION: SINGLE XRAY VIEW OF THE CHEST 7/14/2018 7:16 am COMPARISON: Chest April 30, 2018. HISTORY: ORDERING SYSTEM PROVIDED HISTORY: syncope TECHNOLOGIST PROVIDED HISTORY: Reason for exam:->syncope FINDINGS: Heart appears normal in size. COPD changes. No focal lung consolidation, pneumothorax, pleural effusion or free air. No free air. No acute process. COPD changes. ASSESSMENT     Active Problems:    COPD (chronic obstructive pulmonary disease) (HCC)    Atrial fibrillation (HCC)    Syncope and collapse  Resolved Problems:    * No resolved hospital problems. *        PLAN        1. Confusion resolved. Donepezil stopped. Ortho evaluated pt for fractures and dc instruction and follow up added. 2. Fall precautions, pending 2 D echo  3. Afib rate controlled on metoprolol and Xeralto for AC  4. Tramadol for pain control  5. Day and night orientation to prevent sundowning, nightly seroquel 12.5 mg  6. COPD will continue bronchodilators  7. Urology for BPH/dysuria. On Flomax. recs follow up outpatinet. 8. General diet, Vitamin D replacement and calcium. DC to SNF when placement available     Claudio Tinoco MD      Department of Internal Medicine  [facility]         7/16/2018, 8:14 AM        IM Attending    Pt seen and examined today   I have discussed the care of pt , including pertinent history and exam findings,  with the resident. I have reviewed the key elements of all parts of the encounter with the resident. I agree with the assessment, plan and orders as documented by the resident.     Electronically signed by Simona Seth MD on 7/16/2018 at 10:36 PM

## 2018-07-17 LAB
LV EF: 53 %
LVEF MODALITY: NORMAL

## 2018-07-17 PROCEDURE — G0378 HOSPITAL OBSERVATION PER HR: HCPCS

## 2018-07-17 PROCEDURE — 94640 AIRWAY INHALATION TREATMENT: CPT

## 2018-07-17 PROCEDURE — 1200000000 HC SEMI PRIVATE

## 2018-07-17 PROCEDURE — 6370000000 HC RX 637 (ALT 250 FOR IP): Performed by: STUDENT IN AN ORGANIZED HEALTH CARE EDUCATION/TRAINING PROGRAM

## 2018-07-17 PROCEDURE — 97110 THERAPEUTIC EXERCISES: CPT

## 2018-07-17 PROCEDURE — 97530 THERAPEUTIC ACTIVITIES: CPT

## 2018-07-17 PROCEDURE — 6370000000 HC RX 637 (ALT 250 FOR IP): Performed by: INTERNAL MEDICINE

## 2018-07-17 PROCEDURE — 6370000000 HC RX 637 (ALT 250 FOR IP): Performed by: HOSPITALIST

## 2018-07-17 PROCEDURE — 93306 TTE W/DOPPLER COMPLETE: CPT

## 2018-07-17 PROCEDURE — 99225 PR SBSQ OBSERVATION CARE/DAY 25 MINUTES: CPT | Performed by: INTERNAL MEDICINE

## 2018-07-17 RX ORDER — RIVASTIGMINE 9.5 MG/24H
1 PATCH, EXTENDED RELEASE TRANSDERMAL DAILY
Qty: 30 PATCH | Refills: 3 | Status: ON HOLD | OUTPATIENT
Start: 2018-07-17 | End: 2018-08-01 | Stop reason: HOSPADM

## 2018-07-17 RX ORDER — ERGOCALCIFEROL 1.25 MG/1
50000 CAPSULE ORAL WEEKLY
Qty: 8 CAPSULE | Refills: 0 | Status: SHIPPED | OUTPATIENT
Start: 2018-07-17 | End: 2018-07-18

## 2018-07-17 RX ADMIN — TRAMADOL HYDROCHLORIDE 25 MG: 50 TABLET, FILM COATED ORAL at 23:27

## 2018-07-17 RX ADMIN — Medication 600 MG: at 08:11

## 2018-07-17 RX ADMIN — IPRATROPIUM BROMIDE 2 PUFF: 17 AEROSOL, METERED RESPIRATORY (INHALATION) at 08:56

## 2018-07-17 RX ADMIN — MOMETASONE FUROATE AND FORMOTEROL FUMARATE DIHYDRATE 2 PUFF: 200; 5 AEROSOL RESPIRATORY (INHALATION) at 20:30

## 2018-07-17 RX ADMIN — METOPROLOL SUCCINATE 12.5 MG: 25 TABLET, FILM COATED, EXTENDED RELEASE ORAL at 21:06

## 2018-07-17 RX ADMIN — TRAMADOL HYDROCHLORIDE 25 MG: 50 TABLET, FILM COATED ORAL at 17:12

## 2018-07-17 RX ADMIN — ALBUTEROL SULFATE 2 PUFF: 90 AEROSOL, METERED RESPIRATORY (INHALATION) at 08:56

## 2018-07-17 RX ADMIN — COLCHICINE 0.6 MG: 0.6 TABLET, FILM COATED ORAL at 21:05

## 2018-07-17 RX ADMIN — METOPROLOL SUCCINATE 12.5 MG: 25 TABLET, FILM COATED, EXTENDED RELEASE ORAL at 08:11

## 2018-07-17 RX ADMIN — COLCHICINE 0.6 MG: 0.6 TABLET, FILM COATED ORAL at 08:11

## 2018-07-17 RX ADMIN — RIVAROXABAN 20 MG: 20 TABLET, FILM COATED ORAL at 17:12

## 2018-07-17 RX ADMIN — TRAMADOL HYDROCHLORIDE 25 MG: 50 TABLET, FILM COATED ORAL at 11:48

## 2018-07-17 RX ADMIN — PHENAZOPYRIDINE HYDROCHLORIDE 100 MG: 100 TABLET ORAL at 11:49

## 2018-07-17 RX ADMIN — TAMSULOSIN HYDROCHLORIDE 0.4 MG: 0.4 CAPSULE ORAL at 08:11

## 2018-07-17 RX ADMIN — TRAMADOL HYDROCHLORIDE 25 MG: 50 TABLET, FILM COATED ORAL at 05:24

## 2018-07-17 RX ADMIN — PHENAZOPYRIDINE HYDROCHLORIDE 100 MG: 100 TABLET ORAL at 08:11

## 2018-07-17 RX ADMIN — PHENAZOPYRIDINE HYDROCHLORIDE 100 MG: 100 TABLET ORAL at 17:12

## 2018-07-17 ASSESSMENT — PAIN SCALES - GENERAL
PAINLEVEL_OUTOF10: 0
PAINLEVEL_OUTOF10: 4
PAINLEVEL_OUTOF10: 6
PAINLEVEL_OUTOF10: 4
PAINLEVEL_OUTOF10: 4
PAINLEVEL_OUTOF10: 9
PAINLEVEL_OUTOF10: 6
PAINLEVEL_OUTOF10: 1
PAINLEVEL_OUTOF10: 4
PAINLEVEL_OUTOF10: 4
PAINLEVEL_OUTOF10: 6

## 2018-07-17 ASSESSMENT — PAIN DESCRIPTION - ORIENTATION
ORIENTATION: LOWER;MID
ORIENTATION: LOWER;MID

## 2018-07-17 ASSESSMENT — PAIN DESCRIPTION - PROGRESSION

## 2018-07-17 ASSESSMENT — PAIN DESCRIPTION - PAIN TYPE
TYPE: ACUTE PAIN
TYPE: ACUTE PAIN

## 2018-07-17 ASSESSMENT — PAIN DESCRIPTION - ONSET: ONSET: ON-GOING

## 2018-07-17 ASSESSMENT — PAIN DESCRIPTION - FREQUENCY: FREQUENCY: INTERMITTENT

## 2018-07-17 ASSESSMENT — PAIN DESCRIPTION - DESCRIPTORS
DESCRIPTORS: ACHING;CONSTANT
DESCRIPTORS: ACHING

## 2018-07-17 ASSESSMENT — PAIN DESCRIPTION - LOCATION
LOCATION: BACK
LOCATION: BACK

## 2018-07-17 NOTE — PROGRESS NOTES
Physical Therapy  Facility/Department: [address] ORTHO/MED SURG  Daily Treatment Note  NAME: Jennifer Poole  : 10/26/1931  MRN: [id number]    Date of Service: 2018    Discharge Recommendations:  Subacute/Skilled Nursing Facility   PT Equipment Recommendations  Equipment Needed: No    Patient Diagnosis(es): The primary encounter diagnosis was Syncope and collapse. A diagnosis of Closed fracture of sacrum, unspecified portion of sacrum, initial encounter [facility]) was also pertinent to this visit. has a past medical history of Atrial fibrillation ([address] Cancer [facility]); COPD (chronic obstructive pulmonary disease) (HCC); and Hernia, inguinal, bilateral.   has a past surgical history that includes Small intestine surgery; vascular surgery (Bilateral, 2014); Cataract removal; Colonoscopy; Colonoscopy (5/12/15); Appendectomy; and Cardiac surgery (Right, 5-29-15). Restrictions  Restrictions/Precautions  Restrictions/Precautions: Weight Bearing, Fall Risk  Required Braces or Orthoses?: No  Lower Extremity Weight Bearing Restrictions  Right Lower Extremity Weight Bearing: Weight Bearing As Tolerated  Left Lower Extremity Weight Bearing: Weight Bearing As Tolerated  Position Activity Restriction  Other position/activity restrictions: activity as tolerated, FWB BLE  Subjective   General  Chart Reviewed: Yes  Family / Caregiver Present: Yes (Wife and daughter arrived at end of session.)  Subjective  Subjective: Pt supine in bed upon arrival agreeable to therapy. c/o LBP 9/10 prior to treatment, RN notified. Pain Screening  Patient Currently in Pain: Yes  Pain Assessment  Pain Assessment: 0-10  Pain Level: 9  Pain Type: Acute pain  Pain Location: Back  Pain Orientation: Lower;Mid  Pain Descriptors: Aching  Pain Intervention(s): Medication (see eMar);Repositioned;RN notified;Elevation; Emotional support; Ambulation/Increased activity  Response to Pain Intervention: Patient Satisfied  Vital Signs  Patient Currently in Pain: Yes       Orientation  Orientation  Overall Orientation Status: Within Functional Limits  Objective   Bed mobility  Bridging: Moderate assistance  Rolling to Right: Moderate assistance  Supine to Sit: Moderate assistance  Sit to Supine: Moderate assistance  Comment: ModA x2 for all bed mobility due to LBP, pt unable to tolerate seated position due to pain. Transfers  Comment: Unable to assess pt could not tolerate seated postition at EOB and needed to lay back down. Ambulation  Ambulation?: No (Pt could not amb due to LBP.)          Exercise  Supine LE therex: glut sets, ankle pumps, heel slides, short arc quads, hip abduction slides. Reps: 15 (Pt unable to tolerate SLR)      Assessment   Body structures, Functions, Activity limitations: Decreased functional mobility ; Decreased strength;Decreased cognition;Decreased safe awareness;Decreased endurance;Decreased balance  Assessment: Due to decreased mobility and endurance pt would benefit from continued therapy at SNF. Pt only able to perform supine therex due to pain this session. Prognosis: Good  Patient Education: Importance of mobility  REQUIRES PT FOLLOW UP: Yes  Activity Tolerance  Activity Tolerance: Patient limited by pain; Patient limited by cognitive status  Activity Tolerance: Pt could only tolerate supine LE exercises due to pain.         Goals  Short term goals  Time Frame for Short term goals: 14 visits  Short term goal 1: Mod I for bed mobility  Short term goal 2: Perform functional transfers with SBA  Short term goal 3: Ambulate 300ft with least restrictive device and SBA  Short term goal 4: Participate in 30 minutes of therapy to demo increased endurance  Short term goal 5: Ascend/descend 1 step with CGA    Plan    Plan  Times per week: 5-6x  Current Treatment Recommendations: Strengthening, Balance Training, Functional Mobility Training, Endurance Training, Transfer Training, Patient/Caregiver Education & Training, Equipment Evaluation, Education, &

## 2018-07-17 NOTE — CARE COORDINATION
[address], left vm. Informed bed side sitter was discontined 7/16 at 10:41 am.   Need update on pre-cert and if coming for onsite. Await call back. Met with pts wife and dtr. Update given on admit status change to obs. Medicare obs form reviewed with both. Form given to wife. She wants to review prior to signing. Informed copy will be given to her. Update given on status of snf, await call back. Will update as info obtained. 1108 called [facility], left voice message. 1355 called [facility], left voice message. Await call back. 2500 Daniela Meza to Jay Push at [facility]. She is on her way for onsite. Pre-cert has been started.

## 2018-07-17 NOTE — PROGRESS NOTES
[address] [facility].    Progress Notes            Date:   7/17/2018  Patient name:  Romeo Chu  Date of admission:  7/14/2018  6:17 AM  MRN:   [id number]  YOB: 1931    CHIEF COMPLAINT     History Obtained From:  Patient and chart review. HISTORY OF PRESENT ILLNESS      The patient is a 80 y.o.  male who is admitted to the hospital for syncope and fall. Patient has had syncopal episodes last 3 nights. Not sure if he tripped and fell, states he did not have loss of conscious ness or spells of dizziness. Denies blacking out sensation, racing of heart or diaphoresis. Has dysuria and frequency. WBC elevated 15.1, UA  Does show leukocyte esterase    Past medical history of Afib on xeralto, COPD. CT head negative for acute abnormality. Cervical spine, thoracic spine x ray nornal.  X ray lumbar spine L3 L5 transverse process fracture, x ray pelvis sacral b/l fractures    Patient is seen and examined. No acute events overnight. Patient went for ECHO. Will discharge after ECHO. PAST MEDICAL HISTORY       has a past medical history of Atrial fibrillation ([facility] [address] Cancer [facility]); COPD (chronic obstructive pulmonary disease) ([facility] [address] and Hernia, inguinal, bilateral.    PAST SURGICAL HISTORY       has a past surgical history that includes Small intestine surgery; vascular surgery (Bilateral, 05/09/2014); Cataract removal; Colonoscopy; Colonoscopy (5/12/15); Appendectomy; and Cardiac surgery (Right, 5-29-15). HOME MEDICATIONS        Prior to Admission medications    Medication Sig Start Date End Date Taking?  Authorizing Provider   vitamin D (ERGOCALCIFEROL) 66208 units CAPS capsule Take 1 capsule by mouth once a week for 8 doses 7/17/18 9/5/18 Yes [facility],    donepezil (ARICEPT) 10 MG tablet Take 10 mg by mouth nightly   Yes Historical Provider, MD   XARELTO 20 MG TABS tablet 20 mg daily (with breakfast)  11/9/16  Yes Historical Provider, MD   colchicine (COLCRYS) 0.6 MG tablet Take 1 tablet by mouth 2 times daily 4/30/18   Jess Thomas DPM   COMBIVENT RESPIMAT  MCG/ACT AERS inhaler INHALE 1 PUFF 4 TIMES A DAY AS DIRECTED 12/26/17   Yamileth Thurman MD   AFLURIA PRESERVATIVE FREE 0.5 ML CHARLIE injection  10/10/16   Historical Provider, MD   ipratropium (ATROVENT) 0.06 % nasal spray  11/9/16   Historical Provider, MD   dextromethorphan-guaiFENesin (ROBITUSSIN-DM)  MG/5ML syrup Take 10 mLs by mouth every 6 hours as needed for Cough. 3/10/15   Colton Vasquez MD   budesonide-formoterol (SYMBICORT) 160-4.5 MCG/ACT AERO Inhale 2 puffs into the lungs 2 times daily. Historical Provider, MD   tamsulosin (FLOMAX) 0.4 MG capsule Take 0.3 mg by mouth daily     Historical Provider, MD   zolpidem (AMBIEN) 5 MG tablet Take 10 mg by mouth nightly as needed for Sleep. Historical Provider, MD   metoprolol (TOPROL-XL) 25 MG XL tablet Take 12.5 mg by mouth 2 times daily. Historical Provider, MD   albuterol-ipratropium (COMBIVENT)  MCG/ACT inhaler Inhale 2 puffs into the lungs every 6 hours as needed for Wheezing. Historical Provider, MD       ALLERGIES      Codeine; Fentanyl; and Pcn [penicillins]    SOCIAL HISTORY       reports that he has quit smoking. He has never used smokeless tobacco. He reports that he drinks alcohol. He reports that he does not use drugs. FAMILY HISTORY      family history includes Cancer in his brother, father, and sister. REVIEW OF SYSTEMS      · Constitutional: positive for fall  · Eyes: Negative for visual changes, diplopia, scleral icterus. · ENT: Negative for Headaches, hearing loss, vertigo, mouth sores, sore throat. · Cardiovascular: Negative for lightheadedness/orthostatic symptoms ,chest pain, dyspnea on exertion, palpitations or loss of consciousness. · Respiratory: Negative for cough or wheezing, sputum production, hemoptysis, pleuritic pain.   · Gastrointestinal: input(s): MG in the last 72 hours. INR:   No results for input(s): INR in the last 72 hours. Hepatic functions:   No results for input(s): ALKPHOS, ALT, AST, PROT, BILITOT, BILIDIR, LABALBU in the last 72 hours. Cardiac enzymes:  No results for input(s): CKTOTAL, CKMB, CKMBINDEX, TROPONINI in the last 72 hours. Thyroid functions:   Lab Results   Component Value Date    TSH 1.28 06/17/2015        Imaging/Diagonstics:    Xr Pelvis (1-2 Views)    Result Date: 7/14/2018  EXAMINATION: SINGLE XRAY VIEW OF THE PELVIS 7/14/2018 10:25 am COMPARISON: None. HISTORY: ORDERING SYSTEM PROVIDED HISTORY: Trauma/Fracture TECHNOLOGIST PROVIDED HISTORY: AP, Inlet and Outlet views please, thank you. Reason for exam:->Trauma/Fracture Ordering Physician Provided Reason for Exam: sacral fx,    inlet outlet views Acuity: Acute Type of Exam: Unknown FINDINGS: Bones are osteoporotic. Small nondisplaced sacral fractures demonstrated on CT are not clearly demonstrated radiographically. Other portions of the pelvis appear unremarkable. Bones are osteoporotic. Small nondisplaced sacral fractures demonstrated on CT are not clearly demonstrated radiographically. Xr Pelvis (1-2 Views)    Result Date: 7/14/2018  EXAMINATION: SINGLE XRAY VIEW OF THE PELVIS 7/14/2018 9:43 am COMPARISON: None. HISTORY: ORDERING SYSTEM PROVIDED HISTORY: SACRAL FRACTURE TECHNOLOGIST PROVIDED HISTORY: Reason for exam:->SACRAL FRACTURE FINDINGS: Bilateral sacral fractures are not as well visualized as on prior CT. Right and left hemipelvis appear intact articulating normally at the SI joints and pubic symphysis. Visualized portions of the proximal right and left femur appear unremarkable. Poor visualization of bilateral sacral fractures seen on CT pelvis. Correlate with CT pelvis.      Ct Head Wo Contrast    Result Date: 7/14/2018  EXAMINATION: CT OF THE HEAD WITHOUT CONTRAST; CT OF THE CERVICAL SPINE WITHOUT CONTRAST 7/14/2018 8:27 am TECHNIQUE: CT of the head was performed without the administration of intravenous contrast. Dose modulation, iterative reconstruction, and/or weight based adjustment of the mA/kV was utilized to reduce the radiation dose to as low as reasonably achievable.; CT of the cervical spine was performed without the administration of intravenous contrast. Multiplanar reformatted images are provided for review. Dose modulation, iterative reconstruction, and/or weight based adjustment of the mA/kV was utilized to reduce the radiation dose to as low as reasonably achievable. COMPARISON: None. HISTORY: ORDERING SYSTEM PROVIDED HISTORY: fall, on xarelto Headache and neck pain following trauma CT HEAD FINDINGS: BRAIN/VENTRICLES: There is no acute intracranial hemorrhage, mass effect or midline shift. No abnormal extra-axial fluid collection. The gray-white differentiation is maintained without evidence of an acute infarct. There is prominence of the ventricles and sulci due to global parenchymal volume loss. There are nonspecific areas of hypoattenuation within the periventricular and subcortical white matter, which likely represent chronic microvascular ischemic change. ORBITS: The visualized portion of the orbits demonstrate no acute abnormality. SINUSES: The visualized paranasal sinuses and mastoid air cells demonstrate no acute abnormality. SOFT TISSUES/SKULL: No acute abnormality of the visualized skull or soft tissues. Calcifications involving bilateral carotid vasculature reflect calcific atherosclerosis. CT CERVICAL SPINE FINDINGS: BONES/ALIGNMENT: 7 typical cervical vertebra present maintain normal height and alignment. DEGENERATIVE CHANGES: Degenerative disc disease and facet and uncovertebral joint arthrosis predominate mid and lower cervical spine. No canal stenosis or significant neural foraminal narrowing is seen. SOFT TISSUES: Soft tissue structures surrounding the cervical spine appear unremarkable.   Visualized portions of the weight based adjustment of the mA/kV was utilized to reduce the radiation dose to as low as reasonably achievable.; CT of the thoracic spine was performed without the administration of intravenous contrast. Multiplanar reformatted images are provided for review. Dose modulation, iterative reconstruction, and/or weight based adjustment of the mA/kV was utilized to reduce the radiation dose to as low as reasonably achievable. COMPARISON: None. HISTORY: ORDERING SYSTEM PROVIDED HISTORY: back pain TECHNOLOGIST PROVIDED HISTORY: Reason for exam:->back pain; ORDERING SYSTEM PROVIDED HISTORY: back pain s/p fall FINDINGS: BONES/ALIGNMENT:  Nondisplaced fractures left L3 and L5 transverse processes. No acute compression fracture of the thoracic or lumbar spine. There is normal alignment of the spine. Bone mineralization is abnormally low. Fractures are noted bilaterally through the 1st and 2nd sacral alae. DEGENERATIVE CHANGES: No significant degenerative changes of the thoracic or lumbar spine. Bilateral SI joint osteoarthritis. SOFT TISSUES: No paraspinal mass is seen. Changes of emphysema. Prominent bibasilar bronchiectasis. Calcific atherosclerotic disease aorta. Nondisplaced fractures left L3 and L5 transverse processes. Nondisplaced bilateral sacral fractures. No lumbar compression fracture is evident. No evidence of acute traumatic injury of the thoracic spine. Bones are osteoporotic. Ct Lumbar Spine Wo Contrast    Result Date: 7/14/2018  EXAMINATION: CT OF THE LUMBAR SPINE WITHOUT CONTRAST; CT OF THE THORACIC SPINE WITHOUT CONTRAST 7/14/2018 TECHNIQUE: CT of the lumbar spine was performed without the administration of intravenous contrast. Multiplanar reformatted images are provided for review.  Dose modulation, iterative reconstruction, and/or weight based adjustment of the mA/kV was utilized to reduce the radiation dose to as low as reasonably achievable.; CT of the thoracic spine was performed without

## 2018-07-17 NOTE — PLAN OF CARE
Problem: Falls - Risk of:  Goal: Will remain free from falls  Will remain free from falls   Outcome: Ongoing  Fall risk assessment complete. Bed locked in lowest position with 2/4 side rails up. Bedside table, call light and personal belongings within reach. RN makes frequent rounds. Problem: Pain:  Goal: Control of acute pain  Control of acute pain   Outcome: Ongoing  Pain assessment complete. Pt educated on 0-10 pain scale. Pain medications given as needed. Control of pain ongoing this shift.

## 2018-07-18 VITALS
TEMPERATURE: 98.2 F | WEIGHT: 142 LBS | HEART RATE: 85 BPM | SYSTOLIC BLOOD PRESSURE: 123 MMHG | RESPIRATION RATE: 16 BRPM | BODY MASS INDEX: 22.29 KG/M2 | OXYGEN SATURATION: 95 % | DIASTOLIC BLOOD PRESSURE: 59 MMHG | HEIGHT: 67 IN

## 2018-07-18 PROCEDURE — G0378 HOSPITAL OBSERVATION PER HR: HCPCS

## 2018-07-18 PROCEDURE — 94640 AIRWAY INHALATION TREATMENT: CPT

## 2018-07-18 PROCEDURE — 6370000000 HC RX 637 (ALT 250 FOR IP): Performed by: STUDENT IN AN ORGANIZED HEALTH CARE EDUCATION/TRAINING PROGRAM

## 2018-07-18 PROCEDURE — 97110 THERAPEUTIC EXERCISES: CPT

## 2018-07-18 PROCEDURE — 99217 PR OBSERVATION CARE DISCHARGE MANAGEMENT: CPT | Performed by: INTERNAL MEDICINE

## 2018-07-18 PROCEDURE — 51798 US URINE CAPACITY MEASURE: CPT

## 2018-07-18 PROCEDURE — 94762 N-INVAS EAR/PLS OXIMTRY CONT: CPT

## 2018-07-18 PROCEDURE — 6370000000 HC RX 637 (ALT 250 FOR IP): Performed by: HOSPITALIST

## 2018-07-18 PROCEDURE — 97535 SELF CARE MNGMENT TRAINING: CPT

## 2018-07-18 PROCEDURE — 6370000000 HC RX 637 (ALT 250 FOR IP): Performed by: INTERNAL MEDICINE

## 2018-07-18 PROCEDURE — 97530 THERAPEUTIC ACTIVITIES: CPT

## 2018-07-18 PROCEDURE — 97112 NEUROMUSCULAR REEDUCATION: CPT

## 2018-07-18 RX ORDER — ERGOCALCIFEROL 1.25 MG/1
50000 CAPSULE ORAL WEEKLY
Qty: 8 CAPSULE | Refills: 0 | Status: SHIPPED | OUTPATIENT
Start: 2018-07-18 | End: 2018-09-27

## 2018-07-18 RX ORDER — TRAMADOL HYDROCHLORIDE 50 MG/1
25 TABLET ORAL EVERY 8 HOURS PRN
Qty: 10 TABLET | Refills: 0 | Status: SHIPPED | OUTPATIENT
Start: 2018-07-18 | End: 2018-07-21

## 2018-07-18 RX ADMIN — METOPROLOL SUCCINATE 12.5 MG: 25 TABLET, FILM COATED, EXTENDED RELEASE ORAL at 16:49

## 2018-07-18 RX ADMIN — MOMETASONE FUROATE AND FORMOTEROL FUMARATE DIHYDRATE 2 PUFF: 200; 5 AEROSOL RESPIRATORY (INHALATION) at 09:37

## 2018-07-18 RX ADMIN — PHENAZOPYRIDINE HYDROCHLORIDE 100 MG: 100 TABLET ORAL at 12:24

## 2018-07-18 RX ADMIN — Medication 600 MG: at 08:48

## 2018-07-18 RX ADMIN — RIVAROXABAN 20 MG: 20 TABLET, FILM COATED ORAL at 16:49

## 2018-07-18 RX ADMIN — TRAMADOL HYDROCHLORIDE 25 MG: 50 TABLET, FILM COATED ORAL at 16:48

## 2018-07-18 RX ADMIN — TRAMADOL HYDROCHLORIDE 25 MG: 50 TABLET, FILM COATED ORAL at 06:53

## 2018-07-18 RX ADMIN — PHENAZOPYRIDINE HYDROCHLORIDE 100 MG: 100 TABLET ORAL at 16:50

## 2018-07-18 RX ADMIN — TAMSULOSIN HYDROCHLORIDE 0.4 MG: 0.4 CAPSULE ORAL at 08:51

## 2018-07-18 RX ADMIN — PHENAZOPYRIDINE HYDROCHLORIDE 100 MG: 100 TABLET ORAL at 08:50

## 2018-07-18 RX ADMIN — METOPROLOL SUCCINATE 12.5 MG: 25 TABLET, FILM COATED, EXTENDED RELEASE ORAL at 08:50

## 2018-07-18 RX ADMIN — COLCHICINE 0.6 MG: 0.6 TABLET, FILM COATED ORAL at 08:48

## 2018-07-18 ASSESSMENT — PAIN SCALES - GENERAL
PAINLEVEL_OUTOF10: 4
PAINLEVEL_OUTOF10: 0
PAINLEVEL_OUTOF10: 2
PAINLEVEL_OUTOF10: 5
PAINLEVEL_OUTOF10: 4
PAINLEVEL_OUTOF10: 4

## 2018-07-18 ASSESSMENT — PAIN DESCRIPTION - DESCRIPTORS: DESCRIPTORS: ACHING;DISCOMFORT

## 2018-07-18 ASSESSMENT — PAIN DESCRIPTION - PROGRESSION
CLINICAL_PROGRESSION: NOT CHANGED

## 2018-07-18 ASSESSMENT — PAIN DESCRIPTION - ORIENTATION: ORIENTATION: LOWER;MID

## 2018-07-18 ASSESSMENT — PAIN DESCRIPTION - FREQUENCY: FREQUENCY: CONTINUOUS

## 2018-07-18 ASSESSMENT — PAIN DESCRIPTION - LOCATION: LOCATION: BACK

## 2018-07-18 ASSESSMENT — PAIN DESCRIPTION - PAIN TYPE: TYPE: ACUTE PAIN

## 2018-07-18 NOTE — PLAN OF CARE
Problem: Falls - Risk of:  Goal: Will remain free from falls  Will remain free from falls   Outcome: Met This Shift    Goal: Absence of physical injury  Absence of physical injury   Outcome: Met This Shift      Problem: Pain:  Goal: Pain level will decrease  Pain level will decrease   Outcome: Ongoing    Goal: Control of acute pain  Control of acute pain   Outcome: Ongoing    Goal: Control of chronic pain  Control of chronic pain   Outcome: Ongoing

## 2018-07-18 NOTE — PROGRESS NOTES
[address] [facility].    Progress Notes            Date:   7/18/2018  Patient name:  Pilar Yang  Date of admission:  7/14/2018  6:17 AM  MRN:   [id number]  YOB: 1931    CHIEF COMPLAINT     History Obtained From:  Patient and chart review. HISTORY OF PRESENT ILLNESS      The patient is a 80 y.o.  male who is admitted to the hospital for syncope and fall. Patient has had syncopal episodes last 3 nights. Not sure if he tripped and fell, states he did not have loss of conscious ness or spells of dizziness. Denies blacking out sensation, racing of heart or diaphoresis. Has dysuria and frequency. WBC elevated 15.1, UA  Does show leukocyte esterase    Past medical history of Afib on xeralto, COPD. CT head negative for acute abnormality. Cervical spine, thoracic spine x ray nornal.  X ray lumbar spine L3 L5 transverse process fracture, x ray pelvis sacral b/l fractures    Patient is seen and examined. No acute events overnight. ECHO normal  Awaiting palcement    PAST MEDICAL HISTORY       has a past medical history of Atrial fibrillation ([facility] [address] Cancer [facility]); COPD (chronic obstructive pulmonary disease) ([facility] [address] and Hernia, inguinal, bilateral.    PAST SURGICAL HISTORY       has a past surgical history that includes Small intestine surgery; vascular surgery (Bilateral, 05/09/2014); Cataract removal; Colonoscopy; Colonoscopy (5/12/15); Appendectomy; and Cardiac surgery (Right, 5-29-15). HOME MEDICATIONS        Prior to Admission medications    Medication Sig Start Date End Date Taking?  Authorizing Provider   vitamin D (ERGOCALCIFEROL) 59160 units CAPS capsule Take 1 capsule by mouth once a week for 8 doses 7/17/18 9/5/18 Yes Katerin Kathleen,    rivastigmine (EXELON) 9.5 MG/24HR Place 1 patch onto the skin daily 7/17/18  Yes Jelena Cobb MD   XARELTO 20 MG TABS tablet 20 mg daily (with breakfast)  11/9/16  Yes Historical Provider, MD colchicine (COLCRYS) 0.6 MG tablet Take 1 tablet by mouth 2 times daily 4/30/18   Steven Simmons DPM   COMBIVENT RESPIMAT  MCG/ACT AERS inhaler INHALE 1 PUFF 4 TIMES A DAY AS DIRECTED 12/26/17   MD CHELLY KolbURIA PRESERVATIVE FREE 0.5 ML CHARLIE injection  10/10/16   Historical Provider, MD   ipratropium (ATROVENT) 0.06 % nasal spray  11/9/16   Historical Provider, MD   dextromethorphan-guaiFENesin (ROBITUSSIN-DM)  MG/5ML syrup Take 10 mLs by mouth every 6 hours as needed for Cough. 3/10/15   Yolanda Hdz MD   budesonide-formoterol (SYMBICORT) 160-4.5 MCG/ACT AERO Inhale 2 puffs into the lungs 2 times daily. Historical Provider, MD   tamsulosin (FLOMAX) 0.4 MG capsule Take 0.3 mg by mouth daily     Historical Provider, MD   zolpidem (AMBIEN) 5 MG tablet Take 10 mg by mouth nightly as needed for Sleep. Historical Provider, MD   metoprolol (TOPROL-XL) 25 MG XL tablet Take 12.5 mg by mouth 2 times daily. Historical Provider, MD   albuterol-ipratropium (COMBIVENT)  MCG/ACT inhaler Inhale 2 puffs into the lungs every 6 hours as needed for Wheezing. Historical Provider, MD       ALLERGIES      Codeine; Fentanyl; and Pcn [penicillins]    SOCIAL HISTORY       reports that he has quit smoking. He has never used smokeless tobacco. He reports that he drinks alcohol. He reports that he does not use drugs. FAMILY HISTORY      family history includes Cancer in his brother, father, and sister. REVIEW OF SYSTEMS      · Constitutional: positive for fall  · Eyes: Negative for visual changes, diplopia, scleral icterus. · ENT: Negative for Headaches, hearing loss, vertigo, mouth sores, sore throat. · Cardiovascular: Negative for lightheadedness/orthostatic symptoms ,chest pain, dyspnea on exertion, palpitations or loss of consciousness. · Respiratory: Negative for cough or wheezing, sputum production, hemoptysis, pleuritic pain.   · Gastrointestinal: Negative for nausea/vomiting, contrast. Dose modulation, iterative reconstruction, and/or weight based adjustment of the mA/kV was utilized to reduce the radiation dose to as low as reasonably achievable.; CT of the cervical spine was performed without the administration of intravenous contrast. Multiplanar reformatted images are provided for review. Dose modulation, iterative reconstruction, and/or weight based adjustment of the mA/kV was utilized to reduce the radiation dose to as low as reasonably achievable. COMPARISON: None. HISTORY: ORDERING SYSTEM PROVIDED HISTORY: fall, on xarelto Headache and neck pain following trauma CT HEAD FINDINGS: BRAIN/VENTRICLES: There is no acute intracranial hemorrhage, mass effect or midline shift. No abnormal extra-axial fluid collection. The gray-white differentiation is maintained without evidence of an acute infarct. There is prominence of the ventricles and sulci due to global parenchymal volume loss. There are nonspecific areas of hypoattenuation within the periventricular and subcortical white matter, which likely represent chronic microvascular ischemic change. ORBITS: The visualized portion of the orbits demonstrate no acute abnormality. SINUSES: The visualized paranasal sinuses and mastoid air cells demonstrate no acute abnormality. SOFT TISSUES/SKULL: No acute abnormality of the visualized skull or soft tissues. Calcifications involving bilateral carotid vasculature reflect calcific atherosclerosis. CT CERVICAL SPINE FINDINGS: BONES/ALIGNMENT: 7 typical cervical vertebra present maintain normal height and alignment. DEGENERATIVE CHANGES: Degenerative disc disease and facet and uncovertebral joint arthrosis predominate mid and lower cervical spine. No canal stenosis or significant neural foraminal narrowing is seen. SOFT TISSUES: Soft tissue structures surrounding the cervical spine appear unremarkable.   Visualized portions of the upper hemithoraces, skullbase and posterior fossa contents appear the radiation dose to as low as reasonably achievable.; CT of the thoracic spine was performed without the administration of intravenous contrast. Multiplanar reformatted images are provided for review. Dose modulation, iterative reconstruction, and/or weight based adjustment of the mA/kV was utilized to reduce the radiation dose to as low as reasonably achievable. COMPARISON: None. HISTORY: ORDERING SYSTEM PROVIDED HISTORY: back pain TECHNOLOGIST PROVIDED HISTORY: Reason for exam:->back pain; ORDERING SYSTEM PROVIDED HISTORY: back pain s/p fall FINDINGS: BONES/ALIGNMENT:  Nondisplaced fractures left L3 and L5 transverse processes. No acute compression fracture of the thoracic or lumbar spine. There is normal alignment of the spine. Bone mineralization is abnormally low. Fractures are noted bilaterally through the 1st and 2nd sacral alae. DEGENERATIVE CHANGES: No significant degenerative changes of the thoracic or lumbar spine. Bilateral SI joint osteoarthritis. SOFT TISSUES: No paraspinal mass is seen. Changes of emphysema. Prominent bibasilar bronchiectasis. Calcific atherosclerotic disease aorta. Nondisplaced fractures left L3 and L5 transverse processes. Nondisplaced bilateral sacral fractures. No lumbar compression fracture is evident. No evidence of acute traumatic injury of the thoracic spine. Bones are osteoporotic. Ct Lumbar Spine Wo Contrast    Result Date: 7/14/2018  EXAMINATION: CT OF THE LUMBAR SPINE WITHOUT CONTRAST; CT OF THE THORACIC SPINE WITHOUT CONTRAST 7/14/2018 TECHNIQUE: CT of the lumbar spine was performed without the administration of intravenous contrast. Multiplanar reformatted images are provided for review.  Dose modulation, iterative reconstruction, and/or weight based adjustment of the mA/kV was utilized to reduce the radiation dose to as low as reasonably achievable.; CT of the thoracic spine was performed without the administration of intravenous contrast. Multiplanar reformatted images are provided for review. Dose modulation, iterative reconstruction, and/or weight based adjustment of the mA/kV was utilized to reduce the radiation dose to as low as reasonably achievable. COMPARISON: None. HISTORY: ORDERING SYSTEM PROVIDED HISTORY: back pain TECHNOLOGIST PROVIDED HISTORY: Reason for exam:->back pain; ORDERING SYSTEM PROVIDED HISTORY: back pain s/p fall FINDINGS: BONES/ALIGNMENT:  Nondisplaced fractures left L3 and L5 transverse processes. No acute compression fracture of the thoracic or lumbar spine. There is normal alignment of the spine. Bone mineralization is abnormally low. Fractures are noted bilaterally through the 1st and 2nd sacral alae. DEGENERATIVE CHANGES: No significant degenerative changes of the thoracic or lumbar spine. Bilateral SI joint osteoarthritis. SOFT TISSUES: No paraspinal mass is seen. Changes of emphysema. Prominent bibasilar bronchiectasis. Calcific atherosclerotic disease aorta. Nondisplaced fractures left L3 and L5 transverse processes. Nondisplaced bilateral sacral fractures. No lumbar compression fracture is evident. No evidence of acute traumatic injury of the thoracic spine. Bones are osteoporotic. Ct Pelvis Wo Contrast Additional Contrast? None    Result Date: 7/14/2018  EXAMINATION: CT OF THE PELVIS WITHOUT CONTRAST 7/14/2018 7:37 am TECHNIQUE: CT of the pelvis was performed without the administration of intravenous contrast.  Multiplanar reformatted images are provided for review. Dose modulation, iterative reconstruction, and/or weight based adjustment of the mA/kV was utilized to reduce the radiation dose to as low as reasonably achievable.  COMPARISON: CT abdomen and pelvis from 06/05/2017 HISTORY: ORDERING SYSTEM PROVIDED HISTORY: pelvic, right hip pain s/p fall TECHNOLOGIST PROVIDED HISTORY: Additional Contrast?->None 80year-old male with right hip pain/pelvis after a fall FINDINGS: Both femoral heads are properly located within the bilateral acetabula. 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 There is subchondral fracturing and associated mild subchondral collapse of the superior aspect of the left femoral head with subjacent subcortical cystic changes. Moderate narrowing of the left hip joint space with associated mild marginal osteophyte spurring. Mild to moderate narrowing of the right hip joint space with associated mild osteophyte spurring. Diffuse osteopenia. Mild degenerative changes in the lower lumbar/lumbosacral spine. Atherosclerotic calcification of the abdominal aorta and iliac branch vasculature. There is also an acute fracture through the anterior aspect of the S2 level with disruption of the cortex on image 94, series 602. There is disruption of the cortex of the anterior sacral ala bilaterally best appreciated on image 30, series 2 likely representing underlying sacral insufficiency fractures. Nondisplaced fracture of the left L5 transverse process on image 11, series 2. Mild degenerative changes of the pubic symphysis. Moderate stool burden. Moderate rectosigmoid and sigmoid colonic diverticulosis. Enlarged prostate with associated calcifications. Correlate with PSA. 1. Acute fracture through the anterior cortex of S2 and bilateral sacral ala and/or sacral insufficiency fractures with disruption of the anterior cortex of the bilateral sacral ala. Underlying diffuse osteopenia. 2. Bilateral femoral head AVN. Subchondral fracturing of the left femoral head and subchondral collapse with moderate left hip osteoarthrosis. Mild right hip osteoarthrosis. 3. Nondisplaced left L5 transverse process fracture. 4. Enlarged prostate. Correlate with PSA. 5. Moderate rectosigmoid and sigmoid diverticulosis. Moderate stool burden. 6. Mild degenerative changes in the lower lumbar/lumbosacral spine. 7. Atherosclerotic calcification of the vasculature.      Xr Chest Portable    Result Date: 7/14/2018  EXAMINATION: SINGLE XRAY VIEW OF THE CHEST 7/14/2018 7:16 am COMPARISON: Chest April 30, 2018. HISTORY: ORDERING SYSTEM PROVIDED HISTORY: syncope TECHNOLOGIST PROVIDED HISTORY: Reason for exam:->syncope FINDINGS: Heart appears normal in size. COPD changes. No focal lung consolidation, pneumothorax, pleural effusion or free air. No free air. No acute process. COPD changes. ASSESSMENT     Active Problems:    COPD (chronic obstructive pulmonary disease) (HCC)    Atrial fibrillation (HCC)    Syncope and collapse    Altered mental state  Resolved Problems:    * No resolved hospital problems. *        PLAN        1. Encephalopathy:  Donepezil stopped. Ortho evaluated pt for fractures and dc instruction and follow up added. 2. Fall precautions, pending 2 D echo  3. Afib rate controlled on metoprolol and Xeralto for AC  4. Tramadol for pain control  5. Day and night orientation to prevent sundowning, nightly seroquel 12.5 mg  6. COPD will continue bronchodilators  7. Urology for BPH/dysuria. On Flomax. recs follow up outpatinet. 8. General diet, Vitamin D replacement and calcium. Will Discharge SNF   pending precert    Yesenia Mcdermott MD      Department of Internal Medicine  [address] [place] [address]         7/18/2018, 8:56 AM    IM Attending     Pt seen and examined before discharge   Discharge plan and medications were reviewed and agreed as documented by resident.   Time spent for discharge planning more than 30 minutes     Electronically signed by Suresh Gtz MD on 7/19/2018 at 12:11 AM

## 2018-07-18 NOTE — CARE COORDINATION
Spoke with Samir at [facility] of [place] [phone]. Still waiting on precert.   According to Samir, pre-cert was sent yesterday at 1100

## 2018-07-18 NOTE — PROGRESS NOTES
Occupational Therapy  Facility/Department: [facility] 2C ORTHO/MED SURG  Daily Treatment Note  NAME: Desire Gold  : 10/26/1931  MRN: [id number]    Date of Service: 2018    Discharge Recommendations:  [address]        Patient Diagnosis(es): The primary encounter diagnosis was Syncope and collapse. Diagnoses of Closed fracture of sacrum, unspecified portion of sacrum, initial encounter ([facility] [address] Closed nondisplaced zone II fracture of sacrum, initial encounter ([facility] and Diverticulitis of large intestine without perforation or abscess without bleeding were also pertinent to this visit. has a past medical history of Atrial fibrillation ([facility] Cancer [facility]); COPD (chronic obstructive pulmonary disease) ([facility]); and Hernia, inguinal, bilateral.   has a past surgical history that includes Small intestine surgery; vascular surgery (Bilateral, 2014); Cataract removal; Colonoscopy; Colonoscopy (5/12/15); Appendectomy; and Cardiac surgery (Right, 5-29-15). Restrictions  Restrictions/Precautions  Restrictions/Precautions: Weight Bearing, Fall Risk  Required Braces or Orthoses?: No  Lower Extremity Weight Bearing Restrictions  Right Lower Extremity Weight Bearing: Weight Bearing As Tolerated  Left Lower Extremity Weight Bearing: Weight Bearing As Tolerated  Position Activity Restriction  Other position/activity restrictions: activity as tolerated, FWB BLE  Subjective   General  Chart Reviewed: No  Patient assessed for rehabilitation services?: Yes  Family / Caregiver Present: Yes (wife, daughter)  General Comment  Pre Treatment Pain Screening  Comments / Details: Pt would experience shooting pain periodically during seated EOB.   Pain Assessment  Patient Currently in Pain: Yes  Pain Assessment: 0-10  Pain Level: 5  Pain Type: Acute pain  Pain Location: Back  Pain Orientation: Lower;Mid  Pain Descriptors: Aching;Discomfort  Pain Frequency: Continuous  Pain Intervention(s): Medication (see eMar); Ambulation/Increased activity; Emotional support;Repositioned  Vital Signs  Patient Currently in Pain: Yes   Orientation  Orientation  Overall Orientation Status: Impaired  Orientation Level: Disoriented to place; Disoriented to time;Disoriented to situation;Oriented to person  Objective    Balance  Sitting Balance: Contact guard assistance (seated EOB, x2)  Standing Balance: Minimal assistance (min A x2 - CGA x2)  Standing Balance  Time: 6 minutes  Activity: Static standing EOB using RW x1 minute x2; x4 minutes with seated rest break needed between stands. Sit to stand: Moderate assistance (x2 for initial stand becoming min A x2 by third attempt.)  Stand to sit: Minimal assistance (x2)  Comment: Multiple verbal cues needed to complete stand with fair/good return. No LOB/buckling noted. Functional Mobility  Functional - Mobility Device: Rolling Walker  Activity: Other  Assist Level: Minimal assistance (x2)  Functional Mobility Comments: Side step to [facility] with multiple verbal cues needed for encouragement to complete task with good return. Bed mobility  Rolling to Left: Minimal assistance  Rolling to Right: Minimal assistance  Supine to Sit: Maximum assistance (x2)  Sit to Supine: Maximum assistance (x2)  Comment: Pt required rapid transition supine/sit and sit/stand (does not tolerate sitting EOB very well d/t pain). Transfers  Sit to stand: Moderate assistance (x2 for initial stand becoming min A x2 by third attempt.)  Stand to sit: Minimal assistance (x2)  Cognition  Overall Cognitive Status: Exceptions  Following Commands: Follows one step commands with increased time; Follows one step commands with repetition  Attention Span: Attends with cues to redirect  Memory: Decreased short term memory  Safety Judgement: Decreased awareness of need for assistance;Decreased awareness of need for safety  Insights: Decreased awareness of deficits  Initiation: Requires cues for some  Sequencing: Requires cues for needed for hand placement on walker to complete stand with initial poor return on first attempt graduating to good return on third attempt. Extra time needed throughout session to complete tasks. Pt retired to supine in bed on side with pillow supporting back at end of session, pt continuing therapy with PTA.     JERRELL Rodriguez/HENNY

## 2018-07-19 NOTE — DISCHARGE SUMMARY
R-3Normal      vitamin D (ERGOCALCIFEROL) 07549 units CAPS capsule Take 1 capsule by mouth once a week for 8 doses, Disp-8 capsule, R-0Print         CONTINUE these medications which have NOT CHANGED    Details   colchicine (COLCRYS) 0.6 MG tablet Take 1 tablet by mouth 2 times daily, Disp-30 tablet, R-1Print      COMBIVENT RESPIMAT  MCG/ACT AERS inhaler INHALE 1 PUFF 4 TIMES A DAY AS DIRECTED, Disp-4 g, R-11Normal      AFLURIA PRESERVATIVE FREE 0.5 ML CHARLIE injection R-0, FAMILIA      ipratropium (ATROVENT) 0.06 % nasal spray R-6      XARELTO 20 MG TABS tablet 20 mg daily (with breakfast) , R-11, DAWHistorical Med      dextromethorphan-guaiFENesin (ROBITUSSIN-DM)  MG/5ML syrup Take 10 mLs by mouth every 6 hours as needed for Cough. , Disp-1 Bottle, R-1      budesonide-formoterol (SYMBICORT) 160-4.5 MCG/ACT AERO Inhale 2 puffs into the lungs 2 times daily. tamsulosin (FLOMAX) 0.4 MG capsule Take 0.3 mg by mouth daily       zolpidem (AMBIEN) 5 MG tablet Take 10 mg by mouth nightly as needed for Sleep.      metoprolol (TOPROL-XL) 25 MG XL tablet Take 12.5 mg by mouth 2 times daily. albuterol-ipratropium (COMBIVENT)  MCG/ACT inhaler Inhale 2 puffs into the lungs every 6 hours as needed for Wheezing. STOP taking these medications       donepezil (ARICEPT) 10 MG tablet Comments:   Reason for Stopping:             Activity: activity as tolerated with support  Diet: low salt    Follow-up with PCP in 1 week. Orthopedic surgery if needed. Note that over 30 minutes was spent in preparing discharge papers, discussing discharge with patient, medication review, etc.    Signed:  Sergio Walker MD  7/19/2018  12:37 PM     IM Attending     Pt seen and examined before discharge   Discharge plan and medications were reviewed and agreed as documented by resident.   Time spent for discharge planning more than 30 minutes     Electronically signed by Alisha Zaragoza MD on 7/20/2018 at 6:28 AM

## 2018-07-25 ENCOUNTER — APPOINTMENT (OUTPATIENT)
Dept: CT IMAGING | Age: 83
DRG: 177 | End: 2018-07-25
Payer: MEDICARE

## 2018-07-25 ENCOUNTER — HOSPITAL ENCOUNTER (INPATIENT)
Age: 83
LOS: 7 days | Discharge: NURSING, REHAB OR LONG-TERM CARE | DRG: 177 | End: 2018-08-01
Attending: EMERGENCY MEDICINE | Admitting: INTERNAL MEDICINE
Payer: MEDICARE

## 2018-07-25 ENCOUNTER — APPOINTMENT (OUTPATIENT)
Dept: GENERAL RADIOLOGY | Age: 83
DRG: 177 | End: 2018-07-25
Payer: MEDICARE

## 2018-07-25 DIAGNOSIS — D64.9 ANEMIA, UNSPECIFIED TYPE: ICD-10-CM

## 2018-07-25 DIAGNOSIS — J18.9 PNEUMONIA DUE TO ORGANISM: ICD-10-CM

## 2018-07-25 DIAGNOSIS — R41.0 DELIRIUM: Primary | ICD-10-CM

## 2018-07-25 LAB
ALBUMIN SERPL-MCNC: 3.4 G/DL (ref 3.5–5.2)
ALBUMIN/GLOBULIN RATIO: 0.9 (ref 1–2.5)
ALP BLD-CCNC: 111 U/L (ref 40–129)
ALT SERPL-CCNC: 15 U/L (ref 5–41)
ANION GAP SERPL CALCULATED.3IONS-SCNC: 15 MMOL/L (ref 9–17)
AST SERPL-CCNC: 30 U/L
BILIRUB SERPL-MCNC: 0.71 MG/DL (ref 0.3–1.2)
BUN BLDV-MCNC: 25 MG/DL (ref 8–23)
BUN/CREAT BLD: ABNORMAL (ref 9–20)
C-REACTIVE PROTEIN: 113.6 MG/L (ref 0–5)
CALCIUM SERPL-MCNC: 8.8 MG/DL (ref 8.6–10.4)
CHLORIDE BLD-SCNC: 96 MMOL/L (ref 98–107)
CO2: 23 MMOL/L (ref 20–31)
CREAT SERPL-MCNC: 0.62 MG/DL (ref 0.7–1.2)
EKG ATRIAL RATE: 73 BPM
EKG Q-T INTERVAL: 364 MS
EKG QRS DURATION: 94 MS
EKG QTC CALCULATION (BAZETT): 445 MS
EKG R AXIS: 74 DEGREES
EKG T AXIS: 49 DEGREES
EKG VENTRICULAR RATE: 90 BPM
GFR AFRICAN AMERICAN: >60 ML/MIN
GFR NON-AFRICAN AMERICAN: >60 ML/MIN
GFR SERPL CREATININE-BSD FRML MDRD: ABNORMAL ML/MIN/{1.73_M2}
GFR SERPL CREATININE-BSD FRML MDRD: ABNORMAL ML/MIN/{1.73_M2}
GLUCOSE BLD-MCNC: 109 MG/DL (ref 70–99)
HCT VFR BLD CALC: 41.4 % (ref 40.7–50.3)
HEMOGLOBIN: 12.9 G/DL (ref 13–17)
INR BLD: 1.5
LACTIC ACID, SEPSIS WHOLE BLOOD: 1.5 MMOL/L (ref 0.5–1.9)
LACTIC ACID, SEPSIS: NORMAL MMOL/L (ref 0.5–1.9)
MCH RBC QN AUTO: 27.7 PG (ref 25.2–33.5)
MCHC RBC AUTO-ENTMCNC: 31.2 G/DL (ref 28.4–34.8)
MCV RBC AUTO: 89 FL (ref 82.6–102.9)
NRBC AUTOMATED: 0 PER 100 WBC
PARTIAL THROMBOPLASTIN TIME: 38.4 SEC (ref 20.5–30.5)
PDW BLD-RTO: 15 % (ref 11.8–14.4)
PLATELET # BLD: 396 K/UL (ref 138–453)
PMV BLD AUTO: 11 FL (ref 8.1–13.5)
POTASSIUM SERPL-SCNC: 5.1 MMOL/L (ref 3.7–5.3)
PROCALCITONIN: 0.04 NG/ML
PROTHROMBIN TIME: 15.4 SEC (ref 9–12)
RBC # BLD: 4.65 M/UL (ref 4.21–5.77)
SODIUM BLD-SCNC: 134 MMOL/L (ref 135–144)
TOTAL PROTEIN: 7.2 G/DL (ref 6.4–8.3)
TROPONIN INTERP: NORMAL
TROPONIN T: <0.03 NG/ML
VITAMIN D 25-HYDROXY: 17.5 NG/ML (ref 30–100)
WBC # BLD: 11.3 K/UL (ref 3.5–11.3)

## 2018-07-25 PROCEDURE — 2580000003 HC RX 258: Performed by: STUDENT IN AN ORGANIZED HEALTH CARE EDUCATION/TRAINING PROGRAM

## 2018-07-25 PROCEDURE — 80053 COMPREHEN METABOLIC PANEL: CPT

## 2018-07-25 PROCEDURE — 70450 CT HEAD/BRAIN W/O DYE: CPT

## 2018-07-25 PROCEDURE — 85730 THROMBOPLASTIN TIME PARTIAL: CPT

## 2018-07-25 PROCEDURE — 6360000002 HC RX W HCPCS: Performed by: HOSPITALIST

## 2018-07-25 PROCEDURE — 6370000000 HC RX 637 (ALT 250 FOR IP): Performed by: STUDENT IN AN ORGANIZED HEALTH CARE EDUCATION/TRAINING PROGRAM

## 2018-07-25 PROCEDURE — 85651 RBC SED RATE NONAUTOMATED: CPT

## 2018-07-25 PROCEDURE — 36415 COLL VENOUS BLD VENIPUNCTURE: CPT

## 2018-07-25 PROCEDURE — 84145 PROCALCITONIN (PCT): CPT

## 2018-07-25 PROCEDURE — 96365 THER/PROPH/DIAG IV INF INIT: CPT

## 2018-07-25 PROCEDURE — 6360000002 HC RX W HCPCS: Performed by: STUDENT IN AN ORGANIZED HEALTH CARE EDUCATION/TRAINING PROGRAM

## 2018-07-25 PROCEDURE — 96375 TX/PRO/DX INJ NEW DRUG ADDON: CPT

## 2018-07-25 PROCEDURE — 2500000003 HC RX 250 WO HCPCS: Performed by: HOSPITALIST

## 2018-07-25 PROCEDURE — 71045 X-RAY EXAM CHEST 1 VIEW: CPT

## 2018-07-25 PROCEDURE — 86140 C-REACTIVE PROTEIN: CPT

## 2018-07-25 PROCEDURE — 72190 X-RAY EXAM OF PELVIS: CPT

## 2018-07-25 PROCEDURE — 87040 BLOOD CULTURE FOR BACTERIA: CPT

## 2018-07-25 PROCEDURE — 85027 COMPLETE CBC AUTOMATED: CPT

## 2018-07-25 PROCEDURE — 84484 ASSAY OF TROPONIN QUANT: CPT

## 2018-07-25 PROCEDURE — 82306 VITAMIN D 25 HYDROXY: CPT

## 2018-07-25 PROCEDURE — 80307 DRUG TEST PRSMV CHEM ANLYZR: CPT

## 2018-07-25 PROCEDURE — 1200000000 HC SEMI PRIVATE

## 2018-07-25 PROCEDURE — 93005 ELECTROCARDIOGRAM TRACING: CPT

## 2018-07-25 PROCEDURE — 96368 THER/DIAG CONCURRENT INF: CPT

## 2018-07-25 PROCEDURE — 6370000000 HC RX 637 (ALT 250 FOR IP): Performed by: HOSPITALIST

## 2018-07-25 PROCEDURE — 99285 EMERGENCY DEPT VISIT HI MDM: CPT

## 2018-07-25 PROCEDURE — 83605 ASSAY OF LACTIC ACID: CPT

## 2018-07-25 PROCEDURE — 2580000003 HC RX 258: Performed by: HOSPITALIST

## 2018-07-25 PROCEDURE — 85610 PROTHROMBIN TIME: CPT

## 2018-07-25 RX ORDER — ERGOCALCIFEROL 1.25 MG/1
50000 CAPSULE ORAL WEEKLY
Status: DISCONTINUED | OUTPATIENT
Start: 2018-07-25 | End: 2018-08-01 | Stop reason: HOSPADM

## 2018-07-25 RX ORDER — TAMSULOSIN HYDROCHLORIDE 0.4 MG/1
0.4 CAPSULE ORAL DAILY
Status: DISCONTINUED | OUTPATIENT
Start: 2018-07-25 | End: 2018-08-01 | Stop reason: HOSPADM

## 2018-07-25 RX ORDER — ONDANSETRON 2 MG/ML
4 INJECTION INTRAMUSCULAR; INTRAVENOUS
Status: COMPLETED | OUTPATIENT
Start: 2018-07-25 | End: 2018-07-25

## 2018-07-25 RX ORDER — 0.9 % SODIUM CHLORIDE 0.9 %
500 INTRAVENOUS SOLUTION INTRAVENOUS ONCE
Status: COMPLETED | OUTPATIENT
Start: 2018-07-25 | End: 2018-07-25

## 2018-07-25 RX ORDER — ACETAMINOPHEN 325 MG/1
650 TABLET ORAL ONCE
Status: COMPLETED | OUTPATIENT
Start: 2018-07-25 | End: 2018-07-25

## 2018-07-25 RX ORDER — SODIUM CHLORIDE 0.9 % (FLUSH) 0.9 %
10 SYRINGE (ML) INJECTION EVERY 12 HOURS SCHEDULED
Status: DISCONTINUED | OUTPATIENT
Start: 2018-07-25 | End: 2018-08-01 | Stop reason: HOSPADM

## 2018-07-25 RX ORDER — ONDANSETRON 2 MG/ML
4 INJECTION INTRAMUSCULAR; INTRAVENOUS EVERY 6 HOURS PRN
Status: DISCONTINUED | OUTPATIENT
Start: 2018-07-25 | End: 2018-08-01 | Stop reason: HOSPADM

## 2018-07-25 RX ORDER — ACETAMINOPHEN 325 MG/1
650 TABLET ORAL EVERY 4 HOURS PRN
Status: DISCONTINUED | OUTPATIENT
Start: 2018-07-25 | End: 2018-08-01 | Stop reason: HOSPADM

## 2018-07-25 RX ORDER — METOPROLOL SUCCINATE 25 MG/1
12.5 TABLET, EXTENDED RELEASE ORAL 2 TIMES DAILY
Status: DISCONTINUED | OUTPATIENT
Start: 2018-07-26 | End: 2018-07-26

## 2018-07-25 RX ORDER — SODIUM CHLORIDE 9 MG/ML
INJECTION, SOLUTION INTRAVENOUS CONTINUOUS
Status: DISCONTINUED | OUTPATIENT
Start: 2018-07-25 | End: 2018-07-28

## 2018-07-25 RX ORDER — COLCHICINE 0.6 MG/1
0.6 TABLET ORAL 2 TIMES DAILY
Status: DISCONTINUED | OUTPATIENT
Start: 2018-07-25 | End: 2018-07-30

## 2018-07-25 RX ORDER — ZOLPIDEM TARTRATE 10 MG/1
10 TABLET ORAL NIGHTLY PRN
COMMUNITY

## 2018-07-25 RX ORDER — ERGOCALCIFEROL 1.25 MG/1
50000 CAPSULE ORAL WEEKLY
Status: DISCONTINUED | OUTPATIENT
Start: 2018-07-25 | End: 2018-07-25

## 2018-07-25 RX ORDER — SODIUM CHLORIDE 0.9 % (FLUSH) 0.9 %
10 SYRINGE (ML) INJECTION EVERY 12 HOURS SCHEDULED
Status: DISCONTINUED | OUTPATIENT
Start: 2018-07-25 | End: 2018-07-25

## 2018-07-25 RX ORDER — LEVOFLOXACIN 5 MG/ML
750 INJECTION, SOLUTION INTRAVENOUS EVERY 24 HOURS
Status: DISCONTINUED | OUTPATIENT
Start: 2018-07-26 | End: 2018-08-01

## 2018-07-25 RX ORDER — ACETAMINOPHEN 650 MG/1
650 SUPPOSITORY RECTAL ONCE
Status: DISCONTINUED | OUTPATIENT
Start: 2018-07-25 | End: 2018-07-25

## 2018-07-25 RX ORDER — CLINDAMYCIN PHOSPHATE 600 MG/50ML
600 INJECTION INTRAVENOUS EVERY 8 HOURS
Status: DISCONTINUED | OUTPATIENT
Start: 2018-07-25 | End: 2018-08-01

## 2018-07-25 RX ORDER — QUETIAPINE FUMARATE 25 MG/1
12.5 TABLET, FILM COATED ORAL NIGHTLY
Status: DISCONTINUED | OUTPATIENT
Start: 2018-07-25 | End: 2018-07-29

## 2018-07-25 RX ORDER — SODIUM CHLORIDE 0.9 % (FLUSH) 0.9 %
10 SYRINGE (ML) INJECTION PRN
Status: DISCONTINUED | OUTPATIENT
Start: 2018-07-25 | End: 2018-08-01 | Stop reason: HOSPADM

## 2018-07-25 RX ORDER — SODIUM CHLORIDE 0.9 % (FLUSH) 0.9 %
10 SYRINGE (ML) INJECTION PRN
Status: DISCONTINUED | OUTPATIENT
Start: 2018-07-25 | End: 2018-07-25

## 2018-07-25 RX ORDER — KETOROLAC TROMETHAMINE 15 MG/ML
15 INJECTION, SOLUTION INTRAMUSCULAR; INTRAVENOUS ONCE
Status: COMPLETED | OUTPATIENT
Start: 2018-07-25 | End: 2018-07-25

## 2018-07-25 RX ORDER — KETOROLAC TROMETHAMINE 15 MG/ML
15 INJECTION, SOLUTION INTRAMUSCULAR; INTRAVENOUS EVERY 6 HOURS PRN
Status: DISPENSED | OUTPATIENT
Start: 2018-07-25 | End: 2018-07-26

## 2018-07-25 RX ORDER — 0.9 % SODIUM CHLORIDE 0.9 %
500 INTRAVENOUS SOLUTION INTRAVENOUS ONCE
Status: DISCONTINUED | OUTPATIENT
Start: 2018-07-25 | End: 2018-07-28

## 2018-07-25 RX ADMIN — ONDANSETRON 4 MG: 2 INJECTION, SOLUTION INTRAMUSCULAR; INTRAVENOUS at 17:10

## 2018-07-25 RX ADMIN — SODIUM CHLORIDE: 9 INJECTION, SOLUTION INTRAVENOUS at 22:00

## 2018-07-25 RX ADMIN — DEXTROSE 500 MG: 5 SOLUTION INTRAVENOUS at 17:00

## 2018-07-25 RX ADMIN — TAMSULOSIN HYDROCHLORIDE 0.4 MG: 0.4 CAPSULE ORAL at 22:07

## 2018-07-25 RX ADMIN — CEFTRIAXONE SODIUM 1 G: 1 INJECTION, POWDER, FOR SOLUTION INTRAMUSCULAR; INTRAVENOUS at 17:00

## 2018-07-25 RX ADMIN — KETOROLAC TROMETHAMINE 15 MG: 15 INJECTION, SOLUTION INTRAMUSCULAR; INTRAVENOUS at 21:55

## 2018-07-25 RX ADMIN — ACETAMINOPHEN 650 MG: 325 TABLET ORAL at 17:10

## 2018-07-25 RX ADMIN — SODIUM CHLORIDE 500 ML: 9 INJECTION, SOLUTION INTRAVENOUS at 16:24

## 2018-07-25 RX ADMIN — QUETIAPINE FUMARATE 12.5 MG: 25 TABLET ORAL at 22:07

## 2018-07-25 RX ADMIN — Medication 10 ML: at 21:56

## 2018-07-25 RX ADMIN — CLINDAMYCIN PHOSPHATE 600 MG: 600 INJECTION, SOLUTION INTRAVENOUS at 22:19

## 2018-07-25 ASSESSMENT — ENCOUNTER SYMPTOMS
COUGH: 1
WHEEZING: 0
BACK PAIN: 1
VOICE CHANGE: 0
EYE DISCHARGE: 0
BLOOD IN STOOL: 0
STRIDOR: 0
DIARRHEA: 0
SHORTNESS OF BREATH: 0
RHINORRHEA: 0
TROUBLE SWALLOWING: 0
VOMITING: 0
FACIAL SWELLING: 0
ABDOMINAL DISTENTION: 0
PHOTOPHOBIA: 0

## 2018-07-25 ASSESSMENT — PAIN SCALES - GENERAL
PAINLEVEL_OUTOF10: 5
PAINLEVEL_OUTOF10: 7

## 2018-07-25 NOTE — ED PROVIDER NOTES
XFER FROM ECF WITH INCREASED CONFUSION, HX OF DEMENTIA, RECENT FALL NOT INVOLVING HEAD TRAUMA. CXR POS FOR POSS RLL PNEUMONIA. REMAINDER OF LABS NONDIAGNOSTIC. ATB'S ORDERED-SEPSIS PROTOCOL INITIATED. CT OF BRAIN PENDING. ADMITTED.       Amrit Grant MD  07/25/18 1711 [facility], [place]  07/25/18 Jenae Daley 15 Mohinder Shelley MD  07/25/18 [id number]

## 2018-07-25 NOTE — ED NOTES
Pt transported to [address] [place] [address], [facility]  07/25/18 [id number]

## 2018-07-25 NOTE — ED PROVIDER NOTES
[facility]     Emergency Department     Faculty Attestation    I performed a history and physical examination of the patient and discussed management with the resident. I reviewed the residents note and agree with the documented findings including all diagnostic interpretations and plan of care. Any areas of disagreement are noted on the chart. I was personally present for the key portions of any procedures. I have documented in the chart those procedures where I was not present during the key portions. I have reviewed the emergency nurses triage note. I agree with the chief complaint, past medical history, past surgical history, allergies, medications, social and family history as documented unless otherwise noted below. Documentation of the HPI, Physical Exam and Medical Decision Making performed by scribaiden is based on my personal performance of the HPI, PE and MDM. For Physician Assistant/ Nurse Practitioner cases/documentation I have personally evaluated this patient and have completed at least one if not all key elements of the E/M (history, physical exam, and MDM). Additional findings are as noted. Primary Care Physician: Argenis Perez MD    History: This is a 80 y.o. male who presents to the Emergency Department with complaint of altered mental status, cough. Patient is currently bedbound due to pelvis fractures and is at a nursing facility. He has had worsening productive cough and change in mentation according to caregivers. Does have a history of dementia. Unable to obtain reliable history from patient due to mental status and baseline mental status. Level V EM Caveat Envoked. Physical:     weight is 135 lb (61.2 kg). His temperature is 98.6 °F (37 °C). His blood pressure is 136/61 and his pulse is 88.  His respiration is 14 and oxygen saturation is 96%.    80 y.o. male ill-appearing, cachectic, will open eyes and converse with you but

## 2018-07-25 NOTE — ED NOTES
Bed: 28  Expected date: 7/25/18  Expected time: 2:23 PM  Means of arrival: Ambulance  Comments:  Harpal Mast RN  07/25/18 [id number]

## 2018-07-25 NOTE — ED NOTES
Family requesting a respiratory tx. Resident notified, awaiting orders. Will continue to monitor.      Pastor Le RN  07/25/18 [id number]

## 2018-07-25 NOTE — ED PROVIDER NOTES
[facility] ED  Emergency Department Encounter  Emergency Medicine Resident     Pt Name: Parish Johnson  MRN: [id number]  Jeromygfza 10/26/1931  Date of evaluation: 7/25/18  PCP:  August Beltran MD    CHIEF COMPLAINT       Chief Complaint   Patient presents with    Altered Mental Status     pt to ER for AMS/tremors has h/o dementia. Per [facility], pt is currently at the [facility] of [place] for recent pelvis fx       HISTORY OF PRESENT ILLNESS  (Location/Symptom, Timing/Onset, Context/Setting, Quality, Duration, Modifying Factors, Severity.)      Parish Johnson is a 80 y.o. male who presents with Altered Mental status for 2 days duration. Family reports patient has been having visual hallucinations, intermittently does not know who he is speaking to. Associated with new onset tremors in all limbs. Patient's had a sacral fracture 7/14. He was prescribed Toradol and Flexeril. Appetite has decreased for one week. Family denies shortness of breath, chest pain, fever, abdominal pain, change in cough or urination. Patient was alert and ambulated with normal appetite before sacral fracture 2 weeks ago. PAST MEDICAL / SURGICAL / SOCIAL / FAMILY HISTORY      has a past medical history of Atrial fibrillation ([facility] [address] Cancer [facility]); COPD (chronic obstructive pulmonary disease) ([facility]); and Hernia, inguinal, bilateral.     has a past surgical history that includes Small intestine surgery; vascular surgery (Bilateral, 05/09/2014); Cataract removal; Colonoscopy; Colonoscopy (5/12/15); Appendectomy; and Cardiac surgery (Right, 5-29-15). Social History     Social History    Marital status:      Spouse name: N/A    Number of children: N/A    Years of education: N/A     Occupational History    Not on file.      Social History Main Topics    Smoking status: Former Smoker    Smokeless tobacco: Never Used    Alcohol use 0.0 oz/week      Comment: socially    Drug use: No    Sexual activity: Not on file Other Topics Concern    Not on file     Social History Narrative    No narrative on file       Family History   Problem Relation Age of Onset    Cancer Father         prostate    Cancer Sister         in abdomen    Cancer Brother         brain       Allergies:  Codeine; Fentanyl; and Pcn [penicillins]    Home Medications:  Prior to Admission medications    Medication Sig Start Date End Date Taking? Authorizing Provider   calcium carbonate 1500 (600 Ca) MG TABS tablet Take 1 tablet by mouth daily 7/19/18  Yes Bharat Bonilla MD   vitamin D (ERGOCALCIFEROL) 86978 units CAPS capsule Take 1 capsule by mouth once a week for 11 doses 7/18/18 9/27/18 Yes Bharat Bonilla MD   colchicine (COLCRYS) 0.6 MG tablet Take 1 tablet by mouth 2 times daily 4/30/18  Yes Lani Rivera DPM   COMBIVENT RESPIMAT  MCG/ACT AERS inhaler INHALE 1 PUFF 4 TIMES A DAY AS DIRECTED 12/26/17  Yes Emily Mcknight MD   ipratropium (ATROVENT) 0.06 % nasal spray  11/9/16  Yes Historical Provider, MD   XARELTO 20 MG TABS tablet 20 mg daily (with breakfast)  11/9/16  Yes Historical Provider, MD   dextromethorphan-guaiFENesin (ROBITUSSIN-DM)  MG/5ML syrup Take 10 mLs by mouth every 6 hours as needed for Cough. 3/10/15  Yes Jenniffer Edward MD   budesonide-formoterol (SYMBICORT) 160-4.5 MCG/ACT AERO Inhale 2 puffs into the lungs 2 times daily. Yes Historical Provider, MD   tamsulosin (FLOMAX) 0.4 MG capsule Take 0.3 mg by mouth daily    Yes Historical Provider, MD   metoprolol (TOPROL-XL) 25 MG XL tablet Take 12.5 mg by mouth 2 times daily.    Yes Historical Provider, MD   rivastigmine (EXELON) 9.5 MG/24HR Place 1 patch onto the skin daily 7/17/18   Maricarmen Peterson MD   AFLURIA PRESERVATIVE FREE 0.5 ML CHARLIE injection  10/10/16   Historical Provider, MD       REVIEW OF SYSTEMS    (2-9 systems for level 4, 10 or more for level 5)      Review of Systems   Constitutional: Positive for activity change (Does not ambulate after sacral x-ray, and CT head without contrast.  Patient's status remained unchanged. X-ray showed right lower lobe pneumonia process-community acquired antibiotics started and acetaminophen given. Medicine consulted-agree to admit patient after CT of head was done in the ED, CT head showed no acute findings. Patient's family agreed with plan of admission and to receive antibiotics. PROCEDURES:  Procedures    CONSULTS:  IP CONSULT TO INTERNAL MEDICINE    CRITICAL CARE:  None    FINAL IMPRESSION      1. Delirium    2.  Pneumonia due to organism          DISPOSITION / PLAN     DISPOSITION Admitted 07/25/2018 05:26:08 PM      PATIENT REFERRED TO:  Virginia Han MD  Critical access hospital. [address]  [phone]            DISCHARGE MEDICATIONS:  New Prescriptions    No medications on file       Margareth Lott MD  Family Medicine Resident    (Please note that portions of this note were completed with a voice recognition program.  Efforts were made to edit the dictations but occasionally words are mis-transcribed.)       Margareth Lott MD  Resident  07/25/18 [id number]

## 2018-07-25 NOTE — ED NOTES
Pt to ER via [facility] from [facility] in [facility] for AMS and new onset tremors. Pt has h/o dementia. Per family AMS worsening with hallucinations. Pt alert and oriented to self and time. Pt has h/o sacral fx s/p fall 2 weeks ago, per family pt did not hit hs head. Pt had appt today with orthopedic surgery to have CT scan done. Placed on full cardiac monitor, no distress noted, rr even and NL.  Will continue to monitor     Carolin Quick RN  07/25/18 2836

## 2018-07-25 NOTE — ED NOTES
Pt back from [address] [place] [address], [facility]  07/25/18 9178

## 2018-07-26 ENCOUNTER — APPOINTMENT (OUTPATIENT)
Dept: GENERAL RADIOLOGY | Age: 83
DRG: 177 | End: 2018-07-26
Payer: MEDICARE

## 2018-07-26 PROBLEM — J18.9 HCAP (HEALTHCARE-ASSOCIATED PNEUMONIA): Status: ACTIVE | Noted: 2018-07-26

## 2018-07-26 PROBLEM — S32.10XA SACRAL FRACTURE (HCC): Status: ACTIVE | Noted: 2018-07-26

## 2018-07-26 PROBLEM — G93.41 METABOLIC ENCEPHALOPATHY: Status: ACTIVE | Noted: 2018-07-26

## 2018-07-26 PROBLEM — J69.0 ASPIRATION PNEUMONIA (HCC): Status: ACTIVE | Noted: 2018-07-26

## 2018-07-26 PROBLEM — E55.9 VITAMIN D DEFICIENCY: Status: ACTIVE | Noted: 2018-07-26

## 2018-07-26 LAB
ABSOLUTE EOS #: 0.17 K/UL (ref 0–0.44)
ABSOLUTE IMMATURE GRANULOCYTE: 0.09 K/UL (ref 0–0.3)
ABSOLUTE LYMPH #: 0.6 K/UL (ref 1.1–3.7)
ABSOLUTE MONO #: 0.77 K/UL (ref 0.1–1.2)
ANION GAP SERPL CALCULATED.3IONS-SCNC: 12 MMOL/L (ref 9–17)
BASOPHILS # BLD: 1 % (ref 0–2)
BASOPHILS ABSOLUTE: 0.09 K/UL (ref 0–0.2)
BUN BLDV-MCNC: 23 MG/DL (ref 8–23)
BUN/CREAT BLD: ABNORMAL (ref 9–20)
CALCIUM SERPL-MCNC: 8.2 MG/DL (ref 8.6–10.4)
CHLORIDE BLD-SCNC: 99 MMOL/L (ref 98–107)
CO2: 26 MMOL/L (ref 20–31)
CREAT SERPL-MCNC: 0.72 MG/DL (ref 0.7–1.2)
DIFFERENTIAL TYPE: ABNORMAL
DIRECT EXAM: NORMAL
EOSINOPHILS RELATIVE PERCENT: 2 % (ref 1–4)
GFR AFRICAN AMERICAN: >60 ML/MIN
GFR NON-AFRICAN AMERICAN: >60 ML/MIN
GFR SERPL CREATININE-BSD FRML MDRD: ABNORMAL ML/MIN/{1.73_M2}
GFR SERPL CREATININE-BSD FRML MDRD: ABNORMAL ML/MIN/{1.73_M2}
GLUCOSE BLD-MCNC: 92 MG/DL (ref 70–99)
HCT VFR BLD CALC: 37.9 % (ref 40.7–50.3)
HEMOGLOBIN: 11.7 G/DL (ref 13–17)
IMMATURE GRANULOCYTES: 1 %
LYMPHOCYTES # BLD: 7 % (ref 24–43)
Lab: NORMAL
Lab: NORMAL
MCH RBC QN AUTO: 27.7 PG (ref 25.2–33.5)
MCHC RBC AUTO-ENTMCNC: 30.9 G/DL (ref 28.4–34.8)
MCV RBC AUTO: 89.8 FL (ref 82.6–102.9)
MONOCYTES # BLD: 9 % (ref 3–12)
MORPHOLOGY: ABNORMAL
NRBC AUTOMATED: 0 PER 100 WBC
PDW BLD-RTO: 15.1 % (ref 11.8–14.4)
PLATELET # BLD: 334 K/UL (ref 138–453)
PLATELET ESTIMATE: ABNORMAL
PMV BLD AUTO: 10.3 FL (ref 8.1–13.5)
POTASSIUM SERPL-SCNC: 4.5 MMOL/L (ref 3.7–5.3)
RBC # BLD: 4.22 M/UL (ref 4.21–5.77)
RBC # BLD: ABNORMAL 10*6/UL
SEDIMENTATION RATE, ERYTHROCYTE: 28 MM (ref 0–10)
SEG NEUTROPHILS: 80 % (ref 36–65)
SEGMENTED NEUTROPHILS ABSOLUTE COUNT: 6.78 K/UL (ref 1.5–8.1)
SODIUM BLD-SCNC: 137 MMOL/L (ref 135–144)
SPECIMEN DESCRIPTION: NORMAL
SPECIMEN DESCRIPTION: NORMAL
STATUS: NORMAL
STATUS: NORMAL
WBC # BLD: 8.5 K/UL (ref 3.5–11.3)
WBC # BLD: ABNORMAL 10*3/UL

## 2018-07-26 PROCEDURE — G8997 SWALLOW GOAL STATUS: HCPCS

## 2018-07-26 PROCEDURE — 6370000000 HC RX 637 (ALT 250 FOR IP): Performed by: HOSPITALIST

## 2018-07-26 PROCEDURE — 2500000003 HC RX 250 WO HCPCS: Performed by: HOSPITALIST

## 2018-07-26 PROCEDURE — 85025 COMPLETE CBC W/AUTO DIFF WBC: CPT

## 2018-07-26 PROCEDURE — 92610 EVALUATE SWALLOWING FUNCTION: CPT

## 2018-07-26 PROCEDURE — 92523 SPEECH SOUND LANG COMPREHEN: CPT

## 2018-07-26 PROCEDURE — 94640 AIRWAY INHALATION TREATMENT: CPT

## 2018-07-26 PROCEDURE — 80048 BASIC METABOLIC PNL TOTAL CA: CPT

## 2018-07-26 PROCEDURE — 92611 MOTION FLUOROSCOPY/SWALLOW: CPT

## 2018-07-26 PROCEDURE — 6360000002 HC RX W HCPCS: Performed by: HOSPITALIST

## 2018-07-26 PROCEDURE — 87899 AGENT NOS ASSAY W/OPTIC: CPT

## 2018-07-26 PROCEDURE — 74230 X-RAY XM SWLNG FUNCJ C+: CPT

## 2018-07-26 PROCEDURE — 94664 DEMO&/EVAL PT USE INHALER: CPT

## 2018-07-26 PROCEDURE — G9168 MEMORY CURRENT STATUS: HCPCS

## 2018-07-26 PROCEDURE — 1200000000 HC SEMI PRIVATE

## 2018-07-26 PROCEDURE — G9169 MEMORY GOAL STATUS: HCPCS

## 2018-07-26 PROCEDURE — G8996 SWALLOW CURRENT STATUS: HCPCS

## 2018-07-26 PROCEDURE — 99223 1ST HOSP IP/OBS HIGH 75: CPT | Performed by: INTERNAL MEDICINE

## 2018-07-26 PROCEDURE — 71045 X-RAY EXAM CHEST 1 VIEW: CPT

## 2018-07-26 PROCEDURE — 36415 COLL VENOUS BLD VENIPUNCTURE: CPT

## 2018-07-26 PROCEDURE — 87449 NOS EACH ORGANISM AG IA: CPT

## 2018-07-26 PROCEDURE — 2580000003 HC RX 258: Performed by: STUDENT IN AN ORGANIZED HEALTH CARE EDUCATION/TRAINING PROGRAM

## 2018-07-26 RX ORDER — MORPHINE SULFATE 2 MG/ML
1 INJECTION, SOLUTION INTRAMUSCULAR; INTRAVENOUS ONCE
Status: COMPLETED | OUTPATIENT
Start: 2018-07-26 | End: 2018-07-26

## 2018-07-26 RX ORDER — METOPROLOL SUCCINATE 25 MG/1
25 TABLET, EXTENDED RELEASE ORAL DAILY
Status: DISCONTINUED | OUTPATIENT
Start: 2018-07-27 | End: 2018-08-01 | Stop reason: HOSPADM

## 2018-07-26 RX ORDER — MEMANTINE HYDROCHLORIDE 5 MG/1
5 TABLET ORAL DAILY
Status: DISCONTINUED | OUTPATIENT
Start: 2018-07-26 | End: 2018-07-28

## 2018-07-26 RX ORDER — METOPROLOL TARTRATE 5 MG/5ML
5 INJECTION INTRAVENOUS EVERY 6 HOURS
Status: DISCONTINUED | OUTPATIENT
Start: 2018-07-26 | End: 2018-07-26

## 2018-07-26 RX ORDER — METOPROLOL TARTRATE 5 MG/5ML
5 INJECTION INTRAVENOUS EVERY 6 HOURS PRN
Status: DISCONTINUED | OUTPATIENT
Start: 2018-07-26 | End: 2018-08-01 | Stop reason: HOSPADM

## 2018-07-26 RX ADMIN — KETOROLAC TROMETHAMINE 15 MG: 15 INJECTION, SOLUTION INTRAMUSCULAR; INTRAVENOUS at 09:46

## 2018-07-26 RX ADMIN — COLCHICINE 0.6 MG: 0.6 TABLET, FILM COATED ORAL at 20:20

## 2018-07-26 RX ADMIN — KETOROLAC TROMETHAMINE 15 MG: 15 INJECTION, SOLUTION INTRAMUSCULAR; INTRAVENOUS at 03:48

## 2018-07-26 RX ADMIN — KETOROLAC TROMETHAMINE 15 MG: 15 INJECTION, SOLUTION INTRAMUSCULAR; INTRAVENOUS at 16:09

## 2018-07-26 RX ADMIN — Medication 10 ML: at 09:01

## 2018-07-26 RX ADMIN — CLINDAMYCIN PHOSPHATE 600 MG: 600 INJECTION, SOLUTION INTRAVENOUS at 20:22

## 2018-07-26 RX ADMIN — LEVOFLOXACIN 750 MG: 5 INJECTION, SOLUTION INTRAVENOUS at 09:01

## 2018-07-26 RX ADMIN — MOMETASONE FUROATE AND FORMOTEROL FUMARATE DIHYDRATE 2 PUFF: 200; 5 AEROSOL RESPIRATORY (INHALATION) at 19:34

## 2018-07-26 RX ADMIN — KETOROLAC TROMETHAMINE 15 MG: 15 INJECTION, SOLUTION INTRAMUSCULAR; INTRAVENOUS at 21:44

## 2018-07-26 RX ADMIN — CLINDAMYCIN PHOSPHATE 600 MG: 600 INJECTION, SOLUTION INTRAVENOUS at 12:17

## 2018-07-26 RX ADMIN — MEMANTINE HYDROCHLORIDE 5 MG: 5 TABLET, FILM COATED ORAL at 20:20

## 2018-07-26 RX ADMIN — QUETIAPINE FUMARATE 12.5 MG: 25 TABLET ORAL at 20:20

## 2018-07-26 RX ADMIN — CLINDAMYCIN PHOSPHATE 600 MG: 600 INJECTION, SOLUTION INTRAVENOUS at 05:18

## 2018-07-26 RX ADMIN — MOMETASONE FUROATE AND FORMOTEROL FUMARATE DIHYDRATE 2 PUFF: 200; 5 AEROSOL RESPIRATORY (INHALATION) at 08:49

## 2018-07-26 RX ADMIN — MORPHINE SULFATE 1 MG: 2 INJECTION, SOLUTION INTRAMUSCULAR; INTRAVENOUS at 14:09

## 2018-07-26 ASSESSMENT — PAIN SCALES - GENERAL
PAINLEVEL_OUTOF10: 5
PAINLEVEL_OUTOF10: 7
PAINLEVEL_OUTOF10: 6
PAINLEVEL_OUTOF10: 4
PAINLEVEL_OUTOF10: 8
PAINLEVEL_OUTOF10: 5

## 2018-07-26 ASSESSMENT — PAIN DESCRIPTION - LOCATION
LOCATION: HIP
LOCATION: HIP

## 2018-07-26 ASSESSMENT — PAIN DESCRIPTION - DESCRIPTORS: DESCRIPTORS: ACHING;DISCOMFORT

## 2018-07-26 ASSESSMENT — PAIN DESCRIPTION - ORIENTATION
ORIENTATION: RIGHT
ORIENTATION: LEFT

## 2018-07-26 ASSESSMENT — PAIN DESCRIPTION - PAIN TYPE
TYPE: CHRONIC PAIN
TYPE: ACUTE PAIN

## 2018-07-26 ASSESSMENT — PAIN DESCRIPTION - FREQUENCY: FREQUENCY: CONTINUOUS

## 2018-07-26 NOTE — PROGRESS NOTES
Speech Language Pathology  Facility/Department: [facility]  Initial Speech/Language/Cognitive Assessment    NAME: Jennifer Poole  : 10/26/1931   MRN: [id number]  ADMISSION DATE: 2018  ADMITTING DIAGNOSIS: has Giant cell arteritis ([address] Syncope; COPD (chronic obstructive pulmonary disease) ([address] Atrial fibrillation ([address] Acute bronchitis; COPD exacerbation ([address] HLD (hyperlipidemia); Anemia; Tubular adenoma of colon; Anemia; Small bowel stricture; Right inguinal hernia; Diverticulitis of large intestine without perforation or abscess without bleeding; Left lower quadrant pain; Cellulitis; Syncope and collapse; Altered mental state; Community acquired pneumonia of right lower lobe of lung ([address] Aspiration pneumonia ([address] Sacral fracture ([address] Metabolic encephalopathy; Vitamin D deficiency; and HCAP (healthcare-associated pneumonia) on his problem list.  DATE ONSET: 18    Date of Eval: 2018   Evaluating Therapist: Anila Rosa, Graduate Clinician    RECENT RESULTS  CT OF HEAD/MRI:    18      No acute intracranial abnormality.       Senescent changes including chronic microvascular change.  Atherosclerotic   disease of the major intracranial vessels. Primary Complaint:     Jennifer Poole is a 80 y.o. male who presents with Altered Mental status for 2 days duration. Family reports patient has been having visual hallucinations, intermittently does not know who he is speaking to. Associated with new onset tremors in all limbs. Patient's had a sacral fracture . He was prescribed Toradol and Flexeril. Appetite has decreased for one week. Family denies shortness of breath, chest pain, fever, abdominal pain, change in cough or urination. Patient was alert and ambulated with normal appetite before sacral fracture 2 weeks ago. Pain:  Pain Assessment  Patient Currently in Pain: Yes    Pt presents with intermittent pain on this date due to recent sacral fracture.  Pain management impacted pt's performance on this date. Further evaluation tasks to be assessed when pt is appropriate for challenging tasks. Assessment:      Pt presents with moderate to severe cognitive deficits characterized by impaired ability to recall orientation, recall 3-5 units immediately, complete verbal reasoning and thought organizational tasks, and complete word generation. Pt aware of confusion and stated, \"Now this is where it gets confusing\". ST to follow up and provide treatment to address noted deficits. Education provided. Pt and family verbalized understanding. ST recommends skilled nursing facility upon discharge at this time. Recommendations:  Requires SLP Intervention: Yes  Duration/Frequency of Treatment: 3-5x/wk  D/C Recommendations: SNF     Goals:  Short-term Goals  Goal 1: Pt will recall orientation with 90% accuracy. Goal 2: Pt will recall 3-5 units without distractions with 90% accuracy  Goal 3: Pt will complete verbal reasoning tasks with 90% accuracy  Goal 4: Pt will complete thought organizational tasks with 90% accuracy  Goal 5: Pt will complete word generation tasks with 90% accuracy   Patient/family involved in developing goals and treatment plan: Yes    Subjective:   Previous level of function and limitations: None noted  General  Chart Reviewed: Yes  Patient assessed for rehabilitation services?: Yes  Family / Caregiver Present: Yes  Social/Functional History  Lives With: Spouse     Cognition:      Orientation  Overall Orientation Status: Impaired  Orientation Level: Oriented to person;Disoriented to time;Disoriented to place; Disoriented to situation  Memory  Memory: Exceptions to [facility]  Short-term Memory: Moderate (1/3 immediate, 1/5 immediate, Delayed:  To be assessed in therapy)  Following 1 and 2 unit commands: WFL (5/5 1 unit, 2/2 2 unit)  Problem Solving  Problem Solving: Exceptions to [facility]  Verbal Reasoning Skills: Severe (0/3 word deductions, similarities and differences: to be assessed in therapy)  Abstract Reasoning  Abstract Reasoning: Exceptions to [facility]  Convergent Thinking: To be assessed in therapy  Divergent Thinking: To be assessed in therapy  Safety/Judgement  Safety/Judgement: Exceptions to [facility]   Flexibility of Thought: To be assessed in therapy   Thought Organization: Moderate (0/3 word associations)  Verbal Sequencing: Mild (4/5 independently)  Word Generation: Moderate (4 units)    Prognosis:  Speech Therapy Prognosis  Prognosis: Good  Individuals consulted  Consulted and agree with results and recommendations: Patient; Family member  Family member consulted: Wife; daughters    Education:  Patient Education: Yes  Patient Education Response: Verbalizes understanding    G-Code:  SLP G-Codes  Functional Limitations: Memory  Memory Current Status (): At least 40 percent but less than 60 percent impaired, limited or restricted  Memory Goal Status (): At least 20 percent but less than 40 percent impaired, limited or restricted         Therapy Time:   Individual Concurrent Group Co-treatment   Time In 7922         Time Out 1115         Minutes 30                 Completed by Julito Mckinney  Clinician    Co-signed by Kary Odonnell A.CCC/SLP    7/26/2018 1:05 PM

## 2018-07-26 NOTE — CARE COORDINATION
Transitional Planning-attempted to talk with patient-pt confused-called and left message with wife Gagandeep Gudino.

## 2018-07-26 NOTE — PROGRESS NOTES
[facility]  Occupational Therapy Not Seen Note    DATE: 2018  Name: Carie Sauer  : 10/26/1931  MRN: [id number]    Patient not available for Occupational Therapy due to:    [] Testing:    [] Hemodialysis    [] Blood Transfusion in Progress    []Refusal by Patient:    [] Surgery/Procedure:    [x] Strict Bedrest    [] Sedation    [] Spine Precautions     [] Pt being transferred to palliative care at this time. Spoke with pt/family and OT services to be defered. [] Pt independent with functional mobility and functional tasks.  Pt with no OT acute care needs at this time, will defer OT eval.    [] Other    Next Scheduled Treatment: Re-check 2018    Signature: GLADYS Arzola/HENNY

## 2018-07-26 NOTE — PROCEDURES
INSTRUMENTAL SWALLOW REPORT  MODIFIED BARIUM SWALLOW    NAME: Carie Sauer   : 10/26/1931  MRN: [id number]       Date of Eval: 2018           Past Medical History:  has a past medical history of Atrial fibrillation ([address] Cancer [facility]); COPD (chronic obstructive pulmonary disease) ([facility]); and Hernia, inguinal, bilateral.  Past Surgical History:  has a past surgical history that includes Small intestine surgery; vascular surgery (Bilateral, 2014); Cataract removal; Colonoscopy; Colonoscopy (5/12/15); Appendectomy; and Cardiac surgery (Right, 5-29-15). Recent CXR/CT of Chest: Date 18    1.  Decreased opacity at the right lung base, likely compatible with   resolving atelectasis.       2.  Worsening opacity at the left lung base, either atelectasis or airspace   disease.       RECOMMENDATION:   PA and lateral views of the chest may prove helpful for best assessment. Patient Complaints/Reason for Referral:  Carie Sauer was referred for a MBS to assess the efficiency of his/her swallow function, assess for aspiration, and to make recommendations regarding safe dietary consistencies, effective compensatory strategies, and safe eating environment. Impressions:    Pt unable to sit at 90 degrees for evaluation. Pt able to tolerate sitting at approximately 40 degrees. Recommend pt sit at no less than 40 degrees for all PO trials.        Dysphagia Outcome Severity Scale: Level 2: Moderate Severe dysphagia- Maximum assistance or maximum use of strategies with partial PO only  Penetration-Aspiration Scale (PAS): 7 - Material enters the airway, passes below the vocal folds, and is not ejected from the trachea despite effort    Recommended Diet:  Solid consistency: Dysphagia I Pureed  Liquid consistency: Pudding       Medication administration: Meds in puree    Safe Swallow Protocol:     Compensatory Swallowing Strategies: Eat/Feed slowly;Upright as possible for all oral intake;Small

## 2018-07-26 NOTE — PROGRESS NOTES
One family member agitated and does not want to follow diet. She thinks he will choke on pudding thick. She does not understand the reason for no straw. Family member had chocolate milk she was going to give patient. Family member irrate with Rn regarding diet.

## 2018-07-26 NOTE — PROGRESS NOTES
Patient also has developed incontinence over the past week since his episode of pneumonia began. He is being treated for prostate Problems, the family could not specify what exactly. Over the past couple of weeks patient has had dysuria and burning micturition , presumably due to some kind of urinary tract infection. Patient has documented significant past medical history of Alzheimer's dementia for which he is on continuous treatment with rivastigmine and donepezil. Patient has Alzheimer's dementia started more than 5 years ago and has progressively worsened. In the beginning he had amnesia for naming objects and routine forgetfulness which has progressed to a point that he cannot find his way around in the house. Patient has had unsteadiness and falls because of the same. During the last episode of fall, he hurt his back and had a fracture around the hip and sacrum. Patient is in a lot of apparent distress because of the pain which might contribute to his altered sensorium. His mobility is restricted. Pain:  Pain Assessment  Patient Currently in Pain: yes    Reason for Referral  Yue Avila was referred for a bedside swallow evaluation to assess the efficiency of his swallow function, identify signs and symptoms of aspiration and make recommendations regarding safe dietary consistencies, effective compensatory strategies, and safe eating environment. Impression  Pt. In extreme pain from fracture and was only able to sit up between 37-40 degrees to complete bedside swallow study. No s/s of aspiration with puree and soft solids. + inconsistent s/s of aspiration with mixed consistencies, nectar thick liquid and thin liquid. Minimal oral residual noted with soft and regular solid that cleared with liquid wash. ST to recommend NPO and video swallow study to r/o/confirm aspiration. Results and recommendations reported to RN.             Treatment Plan  Requires SLP Intervention: Yes  Duration/Frequency of Treatment: 3-5 x week   D/C Recommendations: Inpatient rehabilitation       Recommended Diet and Intervention  Diet Solids Recommendation: NPO  Liquid Consistency Recommendation: NPO          Treatment/Goals  Dysphagia Goals: The patient will tolerate instrumental swallowing procedure    General  Behavior/Cognition: Alert; Cooperative;Confused  Temperature Spikes Noted: No  Respiratory Status: Room air  O2 Device: None (Room air)  Communication Observation: Functional  Follows Directions: Simple  Dentition: Adequate  Patient Positioning: Upright in bed (Pt. unable to sit at 90 degrees. Pt. able to tolerate head of 37-40 degrees ONLY)  Consistencies Administered: Reg solid; Soft solid;Puree; Thin - straw; Thin - cup;Nectar - straw;Nectar - teaspoon    Oral Motor Deficits  Oral/Motor  Oral Motor: Within functional limits    Oral Phase Dysfunction  Oral Phase  Oral Phase: Exceptions  Oral Phase  Oral Phase - Comment: Minimal oral residual noted with soft and regular solid. Pt. able to clear with liquid wash. Indicators of Pharyngeal Phase Dysfunction   Pharyngeal Phase  Pharyngeal Phase: Exceptions  Pharyngeal Phase   Pharyngeal: No s/s of aspiration noted with puree and soft solids. + inconsistent s/s of aspiration noted with nectar thick liquid and thin liquid. Prognosis  Prognosis  Prognosis for safe diet advancement: fair  Individuals consulted  Consulted and agree with results and recommendations: Patient; Family member;RN  Family member consulted: wife, daughters    Education  Patient Education: yes  Patient Education Response: Verbalizes understanding      G-Code  SLP G-Codes  Functional Limitations: Swallowing  Swallow Current Status (): 100 percent impaired, limited or restricted  Swallow Goal Status ():  At least 80 percent but less than 100 percent impaired, limited or restricted         Therapy Time  SLP Individual Minutes  Time In: 3270  Time Out: [address]  Minutes:

## 2018-07-26 NOTE — PROGRESS NOTES
[address]    PROGRESS NOTES            Date:   7/26/2018  Patient name:  Chitra Mancilla  Date of admission:  7/25/2018  2:36 PM  MRN:   [id number]  YOB: 1931    CHIEF COMPLAINT     History Obtained From:  Patient and chart review. HISTORY OF PRESENT ILLNESS      The patient is a 80 y.o.  male who is admitted to the hospital for altered mental status, patient was seen here last admission for syncope and fall. At that time he had b/l sacral fractures and lumbar fracture which was treated conservatively by orthopedics. Has baseline dementia donepezil was discontinued in view of confusion and fall. All pain and sleep medications were minimized. And sent to SNF. This admission CXR shows Rt sided infiltrate denies fever or chills or any complaints at this time. Concern for aspiration. Passed bedside swallow evaluation, awaiting speech evaluation. Started on Levaquin and clindamycin. Ortho consulted per family request.  PMH of AD, Afib on Xeralto and HTN, Vit D deficiency. PAST MEDICAL HISTORY       has a past medical history of Atrial fibrillation ([facility] [address] Cancer [facility]); COPD (chronic obstructive pulmonary disease) ([facility] [address] and Hernia, inguinal, bilateral.    PAST SURGICAL HISTORY       has a past surgical history that includes Small intestine surgery; vascular surgery (Bilateral, 05/09/2014); Cataract removal; Colonoscopy; Colonoscopy (5/12/15); Appendectomy; and Cardiac surgery (Right, 5-29-15). HOME MEDICATIONS        Prior to Admission medications    Medication Sig Start Date End Date Taking? Authorizing Provider   zolpidem (AMBIEN) 10 MG tablet Take 10 mg by mouth nightly as needed for Sleep. .   Yes Historical Provider, MD   calcium carbonate 1500 (600 Ca) MG TABS tablet Take 1 tablet by mouth daily  Patient taking differently: Take 600 mg by mouth daily Pt does not take 7/19/18  Yes Pavan Tracy MD   vitamin D (ERGOCALCIFEROL) 78338 units CAPS capsule Take 1 capsule by mouth once a week for 11 doses 7/18/18 9/27/18 Yes Eric Reyna MD   COMBIVENT RESPIMAT  MCG/ACT AERS inhaler INHALE 1 PUFF 4 TIMES A DAY AS DIRECTED 12/26/17  Yes Chintan Corrigan MD   ipratropium (ATROVENT) 0.06 % nasal spray Pt does not take 11/9/16  Yes Historical Provider, MD   XARELTO 20 MG TABS tablet 20 mg daily (with breakfast)  11/9/16  Yes Historical Provider, MD   dextromethorphan-guaiFENesin (ROBITUSSIN-DM)  MG/5ML syrup Take 10 mLs by mouth every 6 hours as needed for Cough. 3/10/15  Yes Hermelindo Anderson MD   budesonide-formoterol (SYMBICORT) 160-4.5 MCG/ACT AERO Inhale 2 puffs into the lungs 2 times daily Pt does not take   Yes Historical Provider, MD   tamsulosin (FLOMAX) 0.4 MG capsule Take 0.3 mg by mouth daily    Yes Historical Provider, MD   metoprolol (TOPROL-XL) 25 MG XL tablet Take 12.5 mg by mouth 2 times daily. Yes Historical Provider, MD   rivastigmine (EXELON) 9.5 MG/24HR Place 1 patch onto the skin daily  Patient taking differently: Place 1 patch onto the skin daily Pt does not take 7/17/18   Claudia Celaya MD   colchicine (COLCRYS) 0.6 MG tablet Take 1 tablet by mouth 2 times daily  Patient taking differently: Take 0.6 mg by mouth 2 times daily Pt does not take 4/30/18   Marielena James DPM   AFLURIA PRESERVATIVE FREE 0.5 ML CHARLIE injection  10/10/16   Historical Provider, MD       ALLERGIES      Codeine; Fentanyl; and Pcn [penicillins]    SOCIAL HISTORY       reports that he has quit smoking. He has never used smokeless tobacco. He reports that he drinks alcohol. He reports that he does not use drugs. FAMILY HISTORY      family history includes Cancer in his brother, father, and sister. REVIEW OF SYSTEMS      · Constitutional: Negative for weight loss  · Eyes: Negative for visual changes, diplopia, scleral icterus.   · ENT: Negative for Headaches, hearing loss, vertigo, mouth sores, sore throat. · Cardiovascular: Negative for lightheadedness/orthostatic symptoms ,chest pain, dyspnea on exertion, palpitations or loss of consciousness. · Respiratory: Negative for cough or wheezing, sputum production, hemoptysis, pleuritic pain. · Gastrointestinal: Negative for nausea/vomiting, change in bowel habits, abdominal pain, dysphagia/appetite loss, hematemesis, blood in stools. · Genitourinary:Negative for change in bladder habits, dysuria, trouble voiding, hematuria. · Musculoskeletal: Negative for gait disturbance, weakness, joint complaints. · Integumentary: Negative for rash, pruritis. · Neurological: Negative for headache, dizziness, change in muscle strength, numbness/tingling, change in gait, balance, coordination,   · Psychiatric: negative for change in mood, affect, memory, mentation, behavior. · Endocrine: negative for temperature intolerance, excessive thirst, fluid intake, or urination, tremor. · Hematologic/Lymphatic: negative for abnormal bruising or bleeding, blood clots, swollen lymph nodes. · Allergic/Immunologic: negative for nasal congestion, pruritis, hives. PHYSICAL EXAM      /64   Pulse 100   Temp 97.7 °F (36.5 °C) (Oral)   Resp 22   Ht 5' 6.93\" (1.7 m)   Wt 135 lb (61.2 kg)   SpO2 (!) 78%   BMI 21.19 kg/m²      · General appearance: well nourished  · HEENT: Head: Normocephalic, no lesions, without obvious abnormality. · Lungs: decreased breath sounds on right side. · Heart:  irregular rate and rhythm, S1, S2 normal  · Abdomen: soft, non-tender; bowel sounds normal  · Extremities: extremities normal, atraumatic, no cyanosis or edema  · Neurological: confusion +, not oriented to time , place and person.   · Skin - no rash, no lump   · Eye no icterus no redness  · Psych-normal affect   · NEURO-no limb weakness  No facial droop  · Lymphatic system-no lymphadenopathy no splenomegaly     DIAGNOSTICS      Laboratory Testing:  CBC:   Recent Labs      07/26/18   0548 WBC  8.5   HGB  11.7*   PLT  334     BMP:    Recent Labs      07/25/18   1509  07/26/18   0548   NA  134*  137   K  5.1  4.5   CL  96*  99   CO2  23  26   BUN  25*  23   CREATININE  0.62*  0.72   GLUCOSE  109*  92     S. Calcium:  Recent Labs      07/26/18   0548   CALCIUM  8.2*     INR:   Recent Labs      07/25/18   1509   INR  1.5     Hepatic functions:   Recent Labs      07/25/18   1509   ALKPHOS  111   ALT  15   AST  30   PROT  7.2   BILITOT  0.71   LABALBU  3.4*       Thyroid functions:   Lab Results   Component Value Date    TSH 1.28 06/17/2015        Imaging/Diagonstics:  Xr Pelvis (1-2 Views)    Result Date: 7/14/2018  EXAMINATION: SINGLE XRAY VIEW OF THE PELVIS 7/14/2018 10:25 am COMPARISON: None. HISTORY: ORDERING SYSTEM PROVIDED HISTORY: Trauma/Fracture TECHNOLOGIST PROVIDED HISTORY: AP, Inlet and Outlet views please, thank you. Reason for exam:->Trauma/Fracture Ordering Physician Provided Reason for Exam: sacral fx,    inlet outlet views Acuity: Acute Type of Exam: Unknown FINDINGS: Bones are osteoporotic. Small nondisplaced sacral fractures demonstrated on CT are not clearly demonstrated radiographically. Other portions of the pelvis appear unremarkable. Bones are osteoporotic. Small nondisplaced sacral fractures demonstrated on CT are not clearly demonstrated radiographically. Xr Pelvis (1-2 Views)    Result Date: 7/14/2018  EXAMINATION: SINGLE XRAY VIEW OF THE PELVIS 7/14/2018 9:43 am COMPARISON: None. HISTORY: ORDERING SYSTEM PROVIDED HISTORY: SACRAL FRACTURE TECHNOLOGIST PROVIDED HISTORY: Reason for exam:->SACRAL FRACTURE FINDINGS: Bilateral sacral fractures are not as well visualized as on prior CT. Right and left hemipelvis appear intact articulating normally at the SI joints and pubic symphysis. Visualized portions of the proximal right and left femur appear unremarkable. Poor visualization of bilateral sacral fractures seen on CT pelvis. Correlate with CT pelvis.      Rj Duran BONES/ALIGNMENT: 7 typical cervical vertebra present maintain normal height and alignment. DEGENERATIVE CHANGES: Degenerative disc disease and facet and uncovertebral joint arthrosis predominate mid and lower cervical spine. No canal stenosis or significant neural foraminal narrowing is seen. SOFT TISSUES: Soft tissue structures surrounding the cervical spine appear unremarkable. Visualized portions of the upper hemithoraces, skullbase and posterior fossa contents appear unremarkable. Calcifications involving bilateral carotid vasculature reflect calcific atherosclerosis. Emphysematous changes are noted at the lung apices. No acute intracranial abnormality. Degenerative changes involving the cervical spine described above. No cervical fracture or acute traumatic subluxation. Calcifications involving bilateral carotid vasculature reflect calcific atherosclerosis. Emphysema. Note that if pain persists or worsens, or if clinically there is concern for CT occult acute cervical abnormality, flexion/extension C-spine series or MRI cervical spine may be considered for additional evaluation. Ct Cervical Spine Wo Contrast    Result Date: 7/14/2018  EXAMINATION: CT OF THE HEAD WITHOUT CONTRAST; CT OF THE CERVICAL SPINE WITHOUT CONTRAST 7/14/2018 8:27 am TECHNIQUE: CT of the head was performed without the administration of intravenous contrast. Dose modulation, iterative reconstruction, and/or weight based adjustment of the mA/kV was utilized to reduce the radiation dose to as low as reasonably achievable.; CT of the cervical spine was performed without the administration of intravenous contrast. Multiplanar reformatted images are provided for review. Dose modulation, iterative reconstruction, and/or weight based adjustment of the mA/kV was utilized to reduce the radiation dose to as low as reasonably achievable. COMPARISON: None.  HISTORY: ORDERING SYSTEM PROVIDED HISTORY: fall, on xarelto Headache and neck pain following trauma CT HEAD FINDINGS: BRAIN/VENTRICLES: There is no acute intracranial hemorrhage, mass effect or midline shift. No abnormal extra-axial fluid collection. The gray-white differentiation is maintained without evidence of an acute infarct. There is prominence of the ventricles and sulci due to global parenchymal volume loss. There are nonspecific areas of hypoattenuation within the periventricular and subcortical white matter, which likely represent chronic microvascular ischemic change. ORBITS: The visualized portion of the orbits demonstrate no acute abnormality. SINUSES: The visualized paranasal sinuses and mastoid air cells demonstrate no acute abnormality. SOFT TISSUES/SKULL: No acute abnormality of the visualized skull or soft tissues. Calcifications involving bilateral carotid vasculature reflect calcific atherosclerosis. CT CERVICAL SPINE FINDINGS: BONES/ALIGNMENT: 7 typical cervical vertebra present maintain normal height and alignment. DEGENERATIVE CHANGES: Degenerative disc disease and facet and uncovertebral joint arthrosis predominate mid and lower cervical spine. No canal stenosis or significant neural foraminal narrowing is seen. SOFT TISSUES: Soft tissue structures surrounding the cervical spine appear unremarkable. Visualized portions of the upper hemithoraces, skullbase and posterior fossa contents appear unremarkable. Calcifications involving bilateral carotid vasculature reflect calcific atherosclerosis. Emphysematous changes are noted at the lung apices. No acute intracranial abnormality. Degenerative changes involving the cervical spine described above. No cervical fracture or acute traumatic subluxation. Calcifications involving bilateral carotid vasculature reflect calcific atherosclerosis. Emphysema.  Note that if pain persists or worsens, or if clinically there is concern for CT occult acute cervical abnormality, flexion/extension C-spine series or MRI cervical spine may for review. Dose modulation, iterative reconstruction, and/or weight based adjustment of the mA/kV was utilized to reduce the radiation dose to as low as reasonably achievable. COMPARISON: CT abdomen and pelvis from 06/05/2017 HISTORY: ORDERING SYSTEM PROVIDED HISTORY: pelvic, right hip pain s/p fall TECHNOLOGIST PROVIDED HISTORY: Additional Contrast?->None 80year-old male with right hip pain/pelvis after a fall FINDINGS: Both femoral heads are properly located within the bilateral acetabula. 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 There is subchondral fracturing and associated mild subchondral collapse of the superior aspect of the left femoral head with subjacent subcortical cystic changes. Moderate narrowing of the left hip joint space with associated mild marginal osteophyte spurring. Mild to moderate narrowing of the right hip joint space with associated mild osteophyte spurring. Diffuse osteopenia. Mild degenerative changes in the lower lumbar/lumbosacral spine. Atherosclerotic calcification of the abdominal aorta and iliac branch vasculature. There is also an acute fracture through the anterior aspect of the S2 level with disruption of the cortex on image 94, series 602. There is disruption of the cortex of the anterior sacral ala bilaterally best appreciated on image 30, series 2 likely representing underlying sacral insufficiency fractures. Nondisplaced fracture of the left L5 transverse process on image 11, series 2. Mild degenerative changes of the pubic symphysis. Moderate stool burden. Moderate rectosigmoid and sigmoid colonic diverticulosis. Enlarged prostate with associated calcifications. Correlate with PSA. 1. Acute fracture through the anterior cortex of S2 and bilateral sacral ala and/or sacral insufficiency fractures with disruption of the anterior cortex of the bilateral sacral ala. Underlying diffuse osteopenia. 2. Bilateral femoral head AVN.   Subchondral fracturing

## 2018-07-26 NOTE — PROGRESS NOTES
Diet explained to family as per swallow report. Daughter stated that he had dementia not dysphagia. Explained pudding thick liquid and family asked for ensure and shakes. Dr. Sunita Starks notified. Will attempt to reinforce diet education.

## 2018-07-27 ENCOUNTER — APPOINTMENT (OUTPATIENT)
Dept: GENERAL RADIOLOGY | Age: 83
DRG: 177 | End: 2018-07-27
Payer: MEDICARE

## 2018-07-27 LAB
ABSOLUTE EOS #: 0.21 K/UL (ref 0–0.4)
ABSOLUTE IMMATURE GRANULOCYTE: 0 K/UL (ref 0–0.3)
ABSOLUTE LYMPH #: 0.31 K/UL (ref 1–4.8)
ABSOLUTE MONO #: 0.73 K/UL (ref 0.1–0.8)
ANION GAP SERPL CALCULATED.3IONS-SCNC: 15 MMOL/L (ref 9–17)
BASOPHILS # BLD: 0 % (ref 0–2)
BASOPHILS ABSOLUTE: 0 K/UL (ref 0–0.2)
BUN BLDV-MCNC: 18 MG/DL (ref 8–23)
BUN/CREAT BLD: ABNORMAL (ref 9–20)
CALCIUM SERPL-MCNC: 8 MG/DL (ref 8.6–10.4)
CHLORIDE BLD-SCNC: 97 MMOL/L (ref 98–107)
CO2: 22 MMOL/L (ref 20–31)
CREAT SERPL-MCNC: 0.62 MG/DL (ref 0.7–1.2)
DIFFERENTIAL TYPE: ABNORMAL
EOSINOPHILS RELATIVE PERCENT: 2 % (ref 1–4)
GFR AFRICAN AMERICAN: >60 ML/MIN
GFR NON-AFRICAN AMERICAN: >60 ML/MIN
GFR SERPL CREATININE-BSD FRML MDRD: ABNORMAL ML/MIN/{1.73_M2}
GFR SERPL CREATININE-BSD FRML MDRD: ABNORMAL ML/MIN/{1.73_M2}
GLUCOSE BLD-MCNC: 88 MG/DL (ref 70–99)
HCT VFR BLD CALC: 32.7 % (ref 40.7–50.3)
HCT VFR BLD CALC: 38.1 % (ref 40.7–50.3)
HEMOGLOBIN: 10.4 G/DL (ref 13–17)
HEMOGLOBIN: 11.8 G/DL (ref 13–17)
IMMATURE GRANULOCYTES: 0 %
LYMPHOCYTES # BLD: 3 % (ref 24–44)
MCH RBC QN AUTO: 27.6 PG (ref 25.2–33.5)
MCHC RBC AUTO-ENTMCNC: 31 G/DL (ref 28.4–34.8)
MCV RBC AUTO: 89 FL (ref 82.6–102.9)
MONOCYTES # BLD: 7 % (ref 1–7)
MORPHOLOGY: ABNORMAL
NRBC AUTOMATED: 0 PER 100 WBC
PDW BLD-RTO: 14.8 % (ref 11.8–14.4)
PLATELET # BLD: 357 K/UL (ref 138–453)
PLATELET ESTIMATE: ABNORMAL
PMV BLD AUTO: 10.4 FL (ref 8.1–13.5)
POTASSIUM SERPL-SCNC: 3.9 MMOL/L (ref 3.7–5.3)
RBC # BLD: 4.28 M/UL (ref 4.21–5.77)
RBC # BLD: ABNORMAL 10*6/UL
SEG NEUTROPHILS: 88 % (ref 36–66)
SEGMENTED NEUTROPHILS ABSOLUTE COUNT: 9.15 K/UL (ref 1.8–7.7)
SODIUM BLD-SCNC: 134 MMOL/L (ref 135–144)
WBC # BLD: 10.4 K/UL (ref 3.5–11.3)
WBC # BLD: ABNORMAL 10*3/UL

## 2018-07-27 PROCEDURE — 6370000000 HC RX 637 (ALT 250 FOR IP): Performed by: HOSPITALIST

## 2018-07-27 PROCEDURE — 36415 COLL VENOUS BLD VENIPUNCTURE: CPT

## 2018-07-27 PROCEDURE — 97530 THERAPEUTIC ACTIVITIES: CPT

## 2018-07-27 PROCEDURE — 95816 EEG AWAKE AND DROWSY: CPT

## 2018-07-27 PROCEDURE — G8978 MOBILITY CURRENT STATUS: HCPCS

## 2018-07-27 PROCEDURE — G8987 SELF CARE CURRENT STATUS: HCPCS

## 2018-07-27 PROCEDURE — 71045 X-RAY EXAM CHEST 1 VIEW: CPT

## 2018-07-27 PROCEDURE — 6360000002 HC RX W HCPCS: Performed by: HOSPITALIST

## 2018-07-27 PROCEDURE — 99233 SBSQ HOSP IP/OBS HIGH 50: CPT | Performed by: INTERNAL MEDICINE

## 2018-07-27 PROCEDURE — 97127 HC SP THER IVNTJ W/FOCUS COG FUNCJ: CPT

## 2018-07-27 PROCEDURE — 99222 1ST HOSP IP/OBS MODERATE 55: CPT | Performed by: PSYCHIATRY & NEUROLOGY

## 2018-07-27 PROCEDURE — 85025 COMPLETE CBC W/AUTO DIFF WBC: CPT

## 2018-07-27 PROCEDURE — 1200000000 HC SEMI PRIVATE

## 2018-07-27 PROCEDURE — 94640 AIRWAY INHALATION TREATMENT: CPT

## 2018-07-27 PROCEDURE — 2500000003 HC RX 250 WO HCPCS: Performed by: HOSPITALIST

## 2018-07-27 PROCEDURE — 92526 ORAL FUNCTION THERAPY: CPT

## 2018-07-27 PROCEDURE — G8988 SELF CARE GOAL STATUS: HCPCS

## 2018-07-27 PROCEDURE — 97162 PT EVAL MOD COMPLEX 30 MIN: CPT

## 2018-07-27 PROCEDURE — 85014 HEMATOCRIT: CPT

## 2018-07-27 PROCEDURE — 80048 BASIC METABOLIC PNL TOTAL CA: CPT

## 2018-07-27 PROCEDURE — 85018 HEMOGLOBIN: CPT

## 2018-07-27 PROCEDURE — 97535 SELF CARE MNGMENT TRAINING: CPT

## 2018-07-27 PROCEDURE — G8979 MOBILITY GOAL STATUS: HCPCS

## 2018-07-27 PROCEDURE — 97167 OT EVAL HIGH COMPLEX 60 MIN: CPT

## 2018-07-27 PROCEDURE — 2580000003 HC RX 258: Performed by: STUDENT IN AN ORGANIZED HEALTH CARE EDUCATION/TRAINING PROGRAM

## 2018-07-27 RX ORDER — MORPHINE SULFATE 2 MG/ML
1 INJECTION, SOLUTION INTRAMUSCULAR; INTRAVENOUS EVERY 4 HOURS PRN
Status: DISCONTINUED | OUTPATIENT
Start: 2018-07-27 | End: 2018-07-27

## 2018-07-27 RX ORDER — PANTOPRAZOLE SODIUM 40 MG/1
40 TABLET, DELAYED RELEASE ORAL 2 TIMES DAILY
Status: DISCONTINUED | OUTPATIENT
Start: 2018-07-27 | End: 2018-07-28

## 2018-07-27 RX ORDER — HYDROCODONE BITARTRATE AND ACETAMINOPHEN 5; 325 MG/1; MG/1
1 TABLET ORAL EVERY 6 HOURS PRN
Status: DISCONTINUED | OUTPATIENT
Start: 2018-07-27 | End: 2018-08-01 | Stop reason: HOSPADM

## 2018-07-27 RX ADMIN — MOMETASONE FUROATE AND FORMOTEROL FUMARATE DIHYDRATE 2 PUFF: 200; 5 AEROSOL RESPIRATORY (INHALATION) at 20:56

## 2018-07-27 RX ADMIN — LEVOFLOXACIN 750 MG: 5 INJECTION, SOLUTION INTRAVENOUS at 08:59

## 2018-07-27 RX ADMIN — COLCHICINE 0.6 MG: 0.6 TABLET, FILM COATED ORAL at 09:05

## 2018-07-27 RX ADMIN — Medication 10 ML: at 22:33

## 2018-07-27 RX ADMIN — TAMSULOSIN HYDROCHLORIDE 0.4 MG: 0.4 CAPSULE ORAL at 09:05

## 2018-07-27 RX ADMIN — CLINDAMYCIN PHOSPHATE 600 MG: 600 INJECTION, SOLUTION INTRAVENOUS at 22:31

## 2018-07-27 RX ADMIN — HYDROCODONE BITARTRATE AND ACETAMINOPHEN 1 TABLET: 5; 325 TABLET ORAL at 11:56

## 2018-07-27 RX ADMIN — HYDROCODONE BITARTRATE AND ACETAMINOPHEN 1 TABLET: 5; 325 TABLET ORAL at 18:06

## 2018-07-27 RX ADMIN — METOPROLOL SUCCINATE 25 MG: 25 TABLET, EXTENDED RELEASE ORAL at 09:05

## 2018-07-27 RX ADMIN — CLINDAMYCIN PHOSPHATE 600 MG: 600 INJECTION, SOLUTION INTRAVENOUS at 13:56

## 2018-07-27 RX ADMIN — MEMANTINE HYDROCHLORIDE 5 MG: 5 TABLET, FILM COATED ORAL at 09:06

## 2018-07-27 RX ADMIN — MOMETASONE FUROATE AND FORMOTEROL FUMARATE DIHYDRATE 2 PUFF: 200; 5 AEROSOL RESPIRATORY (INHALATION) at 07:51

## 2018-07-27 RX ADMIN — COLCHICINE 0.6 MG: 0.6 TABLET, FILM COATED ORAL at 22:31

## 2018-07-27 RX ADMIN — QUETIAPINE FUMARATE 12.5 MG: 25 TABLET ORAL at 22:32

## 2018-07-27 RX ADMIN — Medication 600 MG: at 11:22

## 2018-07-27 RX ADMIN — RIVAROXABAN 20 MG: 20 TABLET, FILM COATED ORAL at 09:06

## 2018-07-27 RX ADMIN — CLINDAMYCIN PHOSPHATE 600 MG: 600 INJECTION, SOLUTION INTRAVENOUS at 04:48

## 2018-07-27 ASSESSMENT — PAIN SCALES - GENERAL
PAINLEVEL_OUTOF10: 9
PAINLEVEL_OUTOF10: 7
PAINLEVEL_OUTOF10: 8

## 2018-07-27 NOTE — PROGRESS NOTES
person;Disoriented to time;Disoriented to situation    Social/Functional History  Social/Functional History  Lives With: Other (comment)  Type of Home: Facility  Home Layout: One level  Home Access: Level entry  [facility] Help From: Personal care attendant  ADL Assistance: Needs assistance  Homemaking Assistance: Needs assistance  Homemaking Responsibilities: No  Ambulation Assistance: Needs assistance  Transfer Assistance: Needs assistance  Active : No  Additional Comments: Pt was recently discharged to SNF on 7/18 following a fall, plans to be discharged back to SNF for more care. Objective     Observation/Palpation  Posture: Good    AROM RLE (degrees)  RLE AROM: WFL  AROM LLE (degrees)  LLE AROM : WFL  AROM RUE (degrees)  RUE AROM : WFL  AROM LUE (degrees)  LUE AROM : WFL  Strength RLE  Strength RLE: WFL  Comment: Grossly 4+/5  Strength LLE  Strength LLE: WFL  Comment: Grossly 4+/5  Strength RUE  Strength RUE: WFL  Comment: Grossly 4+/5  Strength LUE  Strength LUE: WFL  Comment: Grossly 4+/5     Sensation  Overall Sensation Status: WNL  Bed mobility  Bridging: Independent  Rolling to Left: Minimal assistance  Rolling to Right: Minimal assistance  Supine to Sit: Moderate assistance (Min-mod A x 2)  Sit to Supine: Minimal assistance  Scooting: Stand by assistance  Transfers  Sit to Stand: Moderate Assistance (Mod A x 2)  Stand to sit: Moderate Assistance (Mod A x 2)  Ambulation  Ambulation?: Yes  Ambulation 1  Surface: level tile  Device: [address]:  Moderate assistance;Minimal assistance  Quality of Gait: Decreased endurance, quick fatigue, decreased step length  Distance: 5ft     Balance  Posture: Good  Sitting - Static: Poor;-  Sitting - Dynamic: Poor;-  Standing - Static: Fair  Standing - Dynamic: Fair  Comments: Pt was max A for seated balance d/t spinal extension secondary to pain during sitting         Assessment   Body structures, Functions, Activity limitations: Decreased functional mobility ; Decreased endurance;Decreased coordination;Decreased ROM; Decreased ADL status; Decreased balance;Decreased strength;Decreased safe awareness;Decreased cognition   Pt demonstrated unsafe sitting balance d/t to pain from pelvic fractures. Pt required mod A x 2 for bed mobility and min-mod A for standing and amb. Pt is unsafe to return home at this time based on mobility performance. SNF is recommended. Prognosis: Good  Medium Complexity   Patient was educated on safe mobility. Patient suffers from dementia and Alzheimer. Subacute/Skilled Nursing Facility recommended. Activity Tolerance  Activity Tolerance: Patient limited by fatigue;Patient limited by endurance; Patient limited by pain; Patient limited by cognitive status            Plan   Plan  Times per week: 5   Times per day: Daily  Current Treatment Recommendations: ADL/Self-care Training, Strengthening, IADL Training, Neuromuscular Re-education, Safety Education & Training, Equipment Evaluation, Education, & procurement, Balance Training, Endurance Training, Transfer Training, Gait Training, Cognitive/Perceptual Training  Safety Devices  Type of devices: All fall risk precautions in place, Bed alarm in place, Left in bed, Patient at risk for falls, Gait belt, Call light within reach, Nurse notified, [place] in use  Restraints  Initially in place: No    G-Code  PT G-Codes  Functional Assessment Tool Used: [facility]  Score: 14  Functional Limitation: Mobility: Walking and moving around  Mobility: Walking and Moving Around Current Status (): At least 60 percent but less than 80 percent impaired, limited or restricted  Mobility: Walking and Moving Around Goal Status ():  At least 40 percent but less than 60 percent impaired, limited or restricted    AM-PAC Score  AM-PAC Inpatient Mobility Raw Score : 14  AM-PAC Inpatient T-Scale Score : 38.1  Mobility Inpatient CMS 0-100% Score: 61.29  Mobility Inpatient CMS G-Code Modifier : CL

## 2018-07-27 NOTE — PROGRESS NOTES
Take 1 tablet by mouth daily  Patient taking differently: Take 600 mg by mouth daily Pt does not take 7/19/18  Yes Rossy Rios MD   vitamin D (ERGOCALCIFEROL) 17754 units CAPS capsule Take 1 capsule by mouth once a week for 11 doses 7/18/18 9/27/18 Yes Rossy Rios MD   COMBIVENT RESPIMAT  MCG/ACT AERS inhaler INHALE 1 PUFF 4 TIMES A DAY AS DIRECTED 12/26/17  Yes Clara Arguelles MD   ipratropium (ATROVENT) 0.06 % nasal spray Pt does not take 11/9/16  Yes Historical Provider, MD   XARELTO 20 MG TABS tablet 20 mg daily (with breakfast)  11/9/16  Yes Historical Provider, MD   dextromethorphan-guaiFENesin (ROBITUSSIN-DM)  MG/5ML syrup Take 10 mLs by mouth every 6 hours as needed for Cough. 3/10/15  Yes Vince Kyle MD   budesonide-formoterol (SYMBICORT) 160-4.5 MCG/ACT AERO Inhale 2 puffs into the lungs 2 times daily Pt does not take   Yes Historical Provider, MD   tamsulosin (FLOMAX) 0.4 MG capsule Take 0.3 mg by mouth daily    Yes Historical Provider, MD   metoprolol (TOPROL-XL) 25 MG XL tablet Take 12.5 mg by mouth 2 times daily. Yes Historical Provider, MD   rivastigmine (EXELON) 9.5 MG/24HR Place 1 patch onto the skin daily  Patient taking differently: Place 1 patch onto the skin daily Pt does not take 7/17/18   Aziza Ramirez MD   colchicine (COLCRYS) 0.6 MG tablet Take 1 tablet by mouth 2 times daily  Patient taking differently: Take 0.6 mg by mouth 2 times daily Pt does not take 4/30/18   Del Goyal DPM   AFLURIA PRESERVATIVE FREE 0.5 ML CHARLIE injection  10/10/16   Historical Provider, MD       ALLERGIES      Codeine; Fentanyl; and Pcn [penicillins]    SOCIAL HISTORY       reports that he has quit smoking. He has never used smokeless tobacco. He reports that he drinks alcohol. He reports that he does not use drugs. FAMILY HISTORY      family history includes Cancer in his brother, father, and sister.     REVIEW OF SYSTEMS      · Constitutional: Negative for weight loss  · Eyes: Negative for visual changes, diplopia, scleral icterus. · ENT: Negative for Headaches, hearing loss, vertigo, mouth sores, sore throat. · Cardiovascular: Negative for lightheadedness/orthostatic symptoms ,chest pain, dyspnea on exertion, palpitations or loss of consciousness. · Respiratory: Negative for cough or wheezing, sputum production, hemoptysis, pleuritic pain. · Gastrointestinal: Negative for nausea/vomiting, change in bowel habits, abdominal pain, dysphagia/appetite loss, hematemesis, blood in stools. · Genitourinary:Negative for change in bladder habits, dysuria, trouble voiding, hematuria. · Musculoskeletal: Negative for gait disturbance, weakness, joint complaints. · Integumentary: Negative for rash, pruritis. · Neurological: Negative for headache, dizziness, change in muscle strength, numbness/tingling, change in gait, balance, coordination,   · Psychiatric: negative for change in mood, affect, memory, mentation, behavior. · Endocrine: negative for temperature intolerance, excessive thirst, fluid intake, or urination, tremor. · Hematologic/Lymphatic: negative for abnormal bruising or bleeding, blood clots, swollen lymph nodes. · Allergic/Immunologic: negative for nasal congestion, pruritis, hives. PHYSICAL EXAM      BP (!) 156/74   Pulse 99   Temp 96.8 °F (36 °C) (Oral)   Resp 15   Ht 5' 6.93\" (1.7 m)   Wt 135 lb (61.2 kg)   SpO2 98%   BMI 21.19 kg/m²      · General appearance: well nourished  · HEENT: Head: Normocephalic, no lesions, without obvious abnormality. · Lungs: decreased breath sounds on right side. · Heart:  irregular rate and rhythm, S1, S2 normal  · Abdomen: soft, non-tender; bowel sounds normal  · Extremities: extremities normal, atraumatic, no cyanosis or edema  · Neurological: confusion +, not oriented to time , place and person.   · Skin - no rash, no lump   · Eye no icterus no redness  · Psych-normal affect   · NEURO-no limb weakness  No facial based adjustment of the mA/kV was utilized to reduce the radiation dose to as low as reasonably achievable. COMPARISON: None. HISTORY: ORDERING SYSTEM PROVIDED HISTORY: fall, on xarelto Headache and neck pain following trauma CT HEAD FINDINGS: BRAIN/VENTRICLES: There is no acute intracranial hemorrhage, mass effect or midline shift. No abnormal extra-axial fluid collection. The gray-white differentiation is maintained without evidence of an acute infarct. There is prominence of the ventricles and sulci due to global parenchymal volume loss. There are nonspecific areas of hypoattenuation within the periventricular and subcortical white matter, which likely represent chronic microvascular ischemic change. ORBITS: The visualized portion of the orbits demonstrate no acute abnormality. SINUSES: The visualized paranasal sinuses and mastoid air cells demonstrate no acute abnormality. SOFT TISSUES/SKULL: No acute abnormality of the visualized skull or soft tissues. Calcifications involving bilateral carotid vasculature reflect calcific atherosclerosis. CT CERVICAL SPINE FINDINGS: BONES/ALIGNMENT: 7 typical cervical vertebra present maintain normal height and alignment. DEGENERATIVE CHANGES: Degenerative disc disease and facet and uncovertebral joint arthrosis predominate mid and lower cervical spine. No canal stenosis or significant neural foraminal narrowing is seen. SOFT TISSUES: Soft tissue structures surrounding the cervical spine appear unremarkable. Visualized portions of the upper hemithoraces, skullbase and posterior fossa contents appear unremarkable. Calcifications involving bilateral carotid vasculature reflect calcific atherosclerosis. Emphysematous changes are noted at the lung apices. No acute intracranial abnormality. Degenerative changes involving the cervical spine described above. No cervical fracture or acute traumatic subluxation.  Calcifications involving bilateral carotid vasculature reflect calcific atherosclerosis. Emphysema. Note that if pain persists or worsens, or if clinically there is concern for CT occult acute cervical abnormality, flexion/extension C-spine series or MRI cervical spine may be considered for additional evaluation. Ct Thoracic Spine Wo Contrast    Result Date: 7/14/2018  EXAMINATION: CT OF THE LUMBAR SPINE WITHOUT CONTRAST; CT OF THE THORACIC SPINE WITHOUT CONTRAST 7/14/2018 TECHNIQUE: CT of the lumbar spine was performed without the administration of intravenous contrast. Multiplanar reformatted images are provided for review. Dose modulation, iterative reconstruction, and/or weight based adjustment of the mA/kV was utilized to reduce the radiation dose to as low as reasonably achievable.; CT of the thoracic spine was performed without the administration of intravenous contrast. Multiplanar reformatted images are provided for review. Dose modulation, iterative reconstruction, and/or weight based adjustment of the mA/kV was utilized to reduce the radiation dose to as low as reasonably achievable. COMPARISON: None. HISTORY: ORDERING SYSTEM PROVIDED HISTORY: back pain TECHNOLOGIST PROVIDED HISTORY: Reason for exam:->back pain; ORDERING SYSTEM PROVIDED HISTORY: back pain s/p fall FINDINGS: BONES/ALIGNMENT:  Nondisplaced fractures left L3 and L5 transverse processes. No acute compression fracture of the thoracic or lumbar spine. There is normal alignment of the spine. Bone mineralization is abnormally low. Fractures are noted bilaterally through the 1st and 2nd sacral alae. DEGENERATIVE CHANGES: No significant degenerative changes of the thoracic or lumbar spine. Bilateral SI joint osteoarthritis. SOFT TISSUES: No paraspinal mass is seen. Changes of emphysema. Prominent bibasilar bronchiectasis. Calcific atherosclerotic disease aorta. Nondisplaced fractures left L3 and L5 transverse processes. Nondisplaced bilateral sacral fractures.  No lumbar compression fracture is 7/14/2018  EXAMINATION: CT OF THE PELVIS WITHOUT CONTRAST 7/14/2018 7:37 am TECHNIQUE: CT of the pelvis was performed without the administration of intravenous contrast.  Multiplanar reformatted images are provided for review. Dose modulation, iterative reconstruction, and/or weight based adjustment of the mA/kV was utilized to reduce the radiation dose to as low as reasonably achievable. COMPARISON: CT abdomen and pelvis from 06/05/2017 HISTORY: ORDERING SYSTEM PROVIDED HISTORY: pelvic, right hip pain s/p fall TECHNOLOGIST PROVIDED HISTORY: Additional Contrast?->None 80year-old male with right hip pain/pelvis after a fall FINDINGS: Both femoral heads are properly located within the bilateral acetabula. 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 There is subchondral fracturing and associated mild subchondral collapse of the superior aspect of the left femoral head with subjacent subcortical cystic changes. Moderate narrowing of the left hip joint space with associated mild marginal osteophyte spurring. Mild to moderate narrowing of the right hip joint space with associated mild osteophyte spurring. Diffuse osteopenia. Mild degenerative changes in the lower lumbar/lumbosacral spine. Atherosclerotic calcification of the abdominal aorta and iliac branch vasculature. There is also an acute fracture through the anterior aspect of the S2 level with disruption of the cortex on image 94, series 602. There is disruption of the cortex of the anterior sacral ala bilaterally best appreciated on image 30, series 2 likely representing underlying sacral insufficiency fractures. Nondisplaced fracture of the left L5 transverse process on image 11, series 2. Mild degenerative changes of the pubic symphysis. Moderate stool burden. Moderate rectosigmoid and sigmoid colonic diverticulosis. Enlarged prostate with associated calcifications. Correlate with PSA.      1. Acute fracture through the anterior cortex of S2

## 2018-07-27 NOTE — PLAN OF CARE
Problem: Falls - Risk of:  Goal: Will remain free from falls  Will remain free from falls   Outcome: Met This Shift      Problem: Risk for Impaired Skin Integrity  Goal: Tissue integrity - skin and mucous membranes  Structural intactness and normal physiological function of skin and  mucous membranes.    Outcome: Ongoing      Problem: Pain:  Goal: Control of acute pain  Control of acute pain   Outcome: Met This Shift

## 2018-07-27 NOTE — PROGRESS NOTES
Occupational Therapy   Occupational Therapy Initial Assessment  Date: 2018   Patient Name: Ernie Velásquez  MRN: [id number]     : 10/26/1931    Date of Service: 2018    Discharge Recommendations:  Subacute/Skilled Nursing Facility  OT Equipment Recommendations  Equipment Needed: No    Chief Complaint   Patient presents with    Altered Mental Status     pt to ER for AMS/tremors has h/o dementia. Per [facility], pt is currently at the [facility] of [place] for recent pelvis fx     Pt recently admitted for fall with bilateral pelvis fractures. WBAT B LE, pt discharged to SNF after last admission. Patient Diagnosis(es): The primary encounter diagnosis was Delirium. A diagnosis of Pneumonia due to organism was also pertinent to this visit. has a past medical history of Atrial fibrillation ([facility] [address] Cancer [facility]); COPD (chronic obstructive pulmonary disease) ([facility]); and Hernia, inguinal, bilateral.   has a past surgical history that includes Small intestine surgery; vascular surgery (Bilateral, 2014); Cataract removal; Colonoscopy; Colonoscopy (5/12/15); Appendectomy; and Cardiac surgery (Right, 5-29-15). Restrictions  Restrictions/Precautions  Restrictions/Precautions: Weight Bearing, Fall Risk (pt with recent pelvis fx, very painful to sit)  Required Braces or Orthoses?: No  Lower Extremity Weight Bearing Restrictions  Right Lower Extremity Weight Bearing: Weight Bearing As Tolerated  Left Lower Extremity Weight Bearing: Weight Bearing As Tolerated  Position Activity Restriction  Other position/activity restrictions: Up with assist, ambulation with assist     Subjective   General  Patient assessed for rehabilitation services?: Yes  Family / Caregiver Present: No  General Comment  Comments: RN ok'd OT evaluation. Pt agreeable & cooperative to participate  Pain Assessment  Patient Currently in Pain: Denies (denies pain at rest, states pain increases to 8-9 with mobility.  Per Faces, pain increased complete  Grooming: Minimal assistance; Increased time to complete  UE Bathing: Increased time to complete; Moderate assistance  LE Bathing: Maximum assistance; Increased time to complete  UE Dressing: Moderate assistance; Increased time to complete  LE Dressing: Dependent/Total  Toileting: Dependent/Total (Pt incontinent of bowels upon arrival, pt able roll with min assist, required total assist for pericare/brief management)  Tone RUE  RUE Tone: Normotonic  Tone LUE  LUE Tone: Normotonic  Coordination  Movements Are Fluid And Coordinated: Yes     Bed mobility  Bridging: Independent  Rolling to Left: Minimal assistance  Rolling to Right: Minimal assistance  Supine to Sit: Moderate assistance (min-mod x2)  Sit to Supine: Minimal assistance (assist with trunk control)  Scooting: Stand by assistance  Transfers  Sit to stand: Moderate assistance (x2)  Stand to sit: Moderate assistance (x2)     Cognition  Overall Cognitive Status: Exceptions  Arousal/Alertness: Delayed responses to stimuli  Following Commands: Follows one step commands with increased time; Follows one step commands with repetition (Follows short, direct commands, history of dementia)  Attention Span: Attends with cues to redirect; Difficulty attending to directions  Memory: Decreased recall of biographical Information;Decreased recall of precautions;Decreased recall of recent events;Decreased short term memory  Safety Judgement: Decreased awareness of need for assistance;Decreased awareness of need for safety  Problem Solving: Decreased awareness of errors  Insights: Decreased awareness of deficits  Initiation: Requires cues for all  Sequencing: Requires cues for all  Cognition Comment: baseline dementia        Sensation  Overall Sensation Status: WNL        LUE AROM (degrees)  LUE AROM : WFL  Left Hand AROM (degrees)  Left Hand AROM: WFL  RUE AROM (degrees)  RUE AROM : WFL  Right Hand AROM (degrees)  Right Hand AROM: WFL  LUE Strength  Gross LUE Strength: WFL  LUE Strength Comment: 4+/5  RUE Strength  Gross RUE Strength: WFL  RUE Strength Comment: 4+/5        Assessment   Performance deficits / Impairments: Decreased ADL status; Decreased functional mobility ; Decreased safe awareness;Decreased cognition;Decreased endurance;Decreased high-level IADLs;Decreased balance  Assessment: Pt would benefit from continued OT status post acute discharge to address above noted deficits  Prognosis: Fair  Decision Making: High Complexity  Patient Education: Role of OT, OT POC, safety wtih ADL/bed mobility, importance of mobility, safe discharge - pt requires reinforcement  Barriers to Learning: dementia  REQUIRES OT FOLLOW UP: Yes  Activity Tolerance  Activity Tolerance: Patient Tolerated treatment well;Patient limited by pain; Patient limited by fatigue  Safety Devices  Safety Devices in place: Yes  Type of devices: Left in bed;Bed alarm in place;Call light within reach  Restraints  Initially in place: No         Plan   Plan  Times per week: 4-5  Current Treatment Recommendations: Self-Care / ADL, Safety Education & Training, Functional Mobility Training, Balance Training, Endurance Training, Patient/Caregiver Education & Training, Cognitive Reorientation    G-Code  OT G-codes  Functional Assessment Tool Used: [facility] - ADL  Score: 12/24  Functional Limitation: Self care  Self Care Current Status (): At least 60 percent but less than 80 percent impaired, limited or restricted  Self Care Goal Status (): At least 40 percent but less than 60 percent impaired, limited or restricted      AM-PAC Score   AM-Located within [facility] Inpatient Daily Activity Raw Score: 12  AM-PAC Inpatient ADL T-Scale Score : 30.6  ADL Inpatient CMS 0-100% Score: 66.57  ADL Inpatient CMS G-Code Modifier : CL  How much help from another person does the pt currently need? Unable A Lot A Little None   1. Putting on and taking off regular lower body clothing? 1      2      3       4   2.  Bathing (including washing, rinsing, drying)? 1      2      3      4   3. Toileting, which includes using toilet, bedpan, or urinal?      1      2        3      4   4. Putting on and taking off regular upper body clothing? 1      2      3       4   5. Taking care of personal grooming such as brushing teeth? 1      2      3      4   6. Eating meals? 1      2       3       4     1. Unable = Total/Dependent Assist  2. A Lot = Maximum/Moderate Assist  3. A Little = Minimum/Contact Guard Assist/Supervision  4. None= Modified [place]/Independent    Raw Score Scale Score Scale Score Standard Error Approximate Degree of Functional Impairment     6 17.07 3.74 100%   7 20.13 3.68 92%   8 22.86 3.43 86%   9 25.33 3.17 80%   10 27.31 2.96 75%   11 29.04 2.79 70%   12 30.60 2.68 67%   13 32.03 2.62 63%   14 33.39 2.61 60%   15 34.69 2.65 56%   16 35.96 2.71 53%   17 37.26 2.82 50%   18 38.66 2.97 47%   19 40.22 3.20 43%   20 42.03 3.55 38%   21 44.27 4.08 33%   22 47.10 4.81 26%   23 51.12 5.88 16%   24 57.54 7.36 0%       Goals  Short term goals  Time Frame for Short term goals: Pt will, by discharge  Short term goal 1: demo UB dressing tasks/grooming with set up assistance, seated EOB  Short term goal 2: demo bed mobility with CGA-min assist to increase independence in EOB ADL  Short term goal 3: increase standing tolerance to 5+ minutes with min assist for increased endurance for ADL  Short term goal 4: complete functional mobility/ADL transfers with min assist to increase independence in ADL  Short term goal 5: follow 1 step commands with 90% accuracy to assist in ADL completion  Patient Goals   Patient goals : unable to verbalize goal       Therapy Time   Individual Concurrent Group Co-treatment   Time In 0942         Time Out 1015         Minutes 33              Discharge recommendations discussed with patient during initial evaluation.       GLADYS Varner/HENNY

## 2018-07-27 NOTE — PROGRESS NOTES
Nutrition Education    Type and Reason for Visit: Consult (Consulted by RN to discuss pureed diet )    Consulted by RN to discuss diet with pt family. Pt had swallow study 7/26 and current diet is based on SLP recommendations- Dysphagia I pureed diet with pudding thick liquids. Family upset that pt on pureed diet. Discussed with family that based on SLP recommendations all foods need to be pureed and food should have no lumps/should not require chewing. Nutrition care manual handout on Pureed diet and thickened liquids given to family. Contact information provided. · Verbally reviewed following information with Family: Pureed Diet  · Written educational materials provided. · Contact name and number provided. · Refer to Patient Education activity for more details. Electronically signed by Bianka Lo. [id number] on 7/27/18 at 2:21 PM    Contact Number: [phone]

## 2018-07-27 NOTE — CONSULTS
Neurology Resident Inpatient Consult Note    Briefly: This is a 80year old male who was admitted to the hospital on 07/25/18 for change in his mental status. He is coming from a SNF (where he went for rehab  After hip fracture s/p fall). According to the chart review his change in mental status is associated with visual hallucinations,new onset tremors in all limbs. Patient does follow commands but does not communicate effectively. PMHx alzheimer's dementia,COPD,A-fib,Tubular Adenoma of colon,HLD,GCA. SUBJECTIVE:  This is a 80 y.o.  male admitted 7/25/2018 for Community acquired pneumonia of right lower lobe of lung ([facility] [address] [J18.1]  This is a follow-up neurology progress note. The patient was seen and examined and the chart was reviewed. There were no acute events overnight.  calcium carbonate  600 mg Oral Daily    memantine  5 mg Oral Daily    metoprolol succinate  25 mg Oral Daily    sodium chloride  500 mL Intravenous Once    sodium chloride flush  10 mL Intravenous 2 times per day    mometasone-formoterol  2 puff Inhalation BID    colchicine  0.6 mg Oral BID    tamsulosin  0.4 mg Oral Daily    rivaroxaban  20 mg Oral Daily with breakfast    clindamycin (CLEOCIN) IV  600 mg Intravenous Q8H    levofloxacin  750 mg Intravenous Q24H    QUEtiapine  12.5 mg Oral Nightly    vitamin D  50,000 Units Oral Weekly       Past Medical History:   Diagnosis Date    Atrial fibrillation [facility]) 5/914 12/13/13  - heart ablation of dysrhythmia    Cancer ([facility] [address]     COPD (chronic obstructive pulmonary disease) (HCC)     Hernia, inguinal, bilateral     rt.        Past Surgical History:   Procedure Laterality Date    APPENDECTOMY      CARDIAC SURGERY Right 5-29-15    inguinal hernia with mesh    CATARACT REMOVAL      COLONOSCOPY      COLONOSCOPY  5/12/15    ulceration at the anastomosis with the small bowel-pathology acute and chronic inflammation, rectal polyp-tubular adenoma    SMALL INTESTINE 07/27/2018    HGB 11.8 (L) 07/27/2018    HCT 38.1 (L) 07/27/2018     07/27/2018    CHOL 149 06/17/2015    TRIG 82 06/17/2015    HDL 85 06/17/2015    ALT 15 07/25/2018    AST 30 07/25/2018     (L) 07/27/2018    K 3.9 07/27/2018    CL 97 (L) 07/27/2018    CREATININE 0.62 (L) 07/27/2018    BUN 18 07/27/2018    CO2 22 07/27/2018    TSH 1.28 06/17/2015    PSA 2.94 07/15/2018    INR 1.5 07/25/2018    LABA1C 6.0 03/09/2015         ASSESSMENT/PLAN  80 y.o.  male with delerium likely toxic metabolic due to underlying pneumonia, superimposed on underlying dementia. Recommend to get EEG to r/o any epileptiform etiology for encephalopathy. MRI Brain wo contrast to structural brain abnormality. Will discuss with the attending.     Electronically signed by Marcus Rico MD on 7/27/2018 at 10:00 AM

## 2018-07-28 ENCOUNTER — ANESTHESIA (OUTPATIENT)
Dept: OPERATING ROOM | Age: 83
DRG: 177 | End: 2018-07-28
Payer: MEDICARE

## 2018-07-28 ENCOUNTER — ANESTHESIA EVENT (OUTPATIENT)
Dept: OPERATING ROOM | Age: 83
DRG: 177 | End: 2018-07-28
Payer: MEDICARE

## 2018-07-28 VITALS — DIASTOLIC BLOOD PRESSURE: 80 MMHG | OXYGEN SATURATION: 100 % | SYSTOLIC BLOOD PRESSURE: 109 MMHG

## 2018-07-28 LAB
-: NORMAL
ANION GAP SERPL CALCULATED.3IONS-SCNC: 12 MMOL/L (ref 9–17)
BLOOD BANK SPECIMEN: NORMAL
BUN BLDV-MCNC: 30 MG/DL (ref 8–23)
BUN/CREAT BLD: ABNORMAL (ref 9–20)
CALCIUM SERPL-MCNC: 8 MG/DL (ref 8.6–10.4)
CHLORIDE BLD-SCNC: 103 MMOL/L (ref 98–107)
CO2: 23 MMOL/L (ref 20–31)
CREAT SERPL-MCNC: 0.71 MG/DL (ref 0.7–1.2)
EKG ATRIAL RATE: 119 BPM
EKG ATRIAL RATE: 127 BPM
EKG Q-T INTERVAL: 364 MS
EKG Q-T INTERVAL: 402 MS
EKG QRS DURATION: 90 MS
EKG QRS DURATION: 92 MS
EKG QTC CALCULATION (BAZETT): 469 MS
EKG QTC CALCULATION (BAZETT): 586 MS
EKG R AXIS: 76 DEGREES
EKG R AXIS: 77 DEGREES
EKG T AXIS: 56 DEGREES
EKG T AXIS: 65 DEGREES
EKG VENTRICULAR RATE: 100 BPM
EKG VENTRICULAR RATE: 128 BPM
GFR AFRICAN AMERICAN: >60 ML/MIN
GFR NON-AFRICAN AMERICAN: >60 ML/MIN
GFR SERPL CREATININE-BSD FRML MDRD: ABNORMAL ML/MIN/{1.73_M2}
GFR SERPL CREATININE-BSD FRML MDRD: ABNORMAL ML/MIN/{1.73_M2}
GLUCOSE BLD-MCNC: 100 MG/DL (ref 70–99)
HCT VFR BLD CALC: 25 % (ref 40.7–50.3)
HCT VFR BLD CALC: 28.3 % (ref 40.7–50.3)
HCT VFR BLD CALC: 30.3 % (ref 40.7–50.3)
HCT VFR BLD CALC: 31.1 % (ref 40.7–50.3)
HEMOGLOBIN: 7.8 G/DL (ref 13–17)
HEMOGLOBIN: 9.1 G/DL (ref 13–17)
HEMOGLOBIN: 9.2 G/DL (ref 13–17)
HEMOGLOBIN: 9.9 G/DL (ref 13–17)
INR BLD: 1.3
MAGNESIUM: 1.7 MG/DL (ref 1.6–2.6)
PHOSPHORUS: 2.9 MG/DL (ref 2.5–4.5)
POTASSIUM SERPL-SCNC: 4.3 MMOL/L (ref 3.7–5.3)
PROTHROMBIN TIME: 13.5 SEC (ref 9–12)
REASON FOR REJECTION: NORMAL
SODIUM BLD-SCNC: 138 MMOL/L (ref 135–144)
TROPONIN INTERP: NORMAL
TROPONIN T: <0.03 NG/ML
ZZ NTE CLEAN UP: ORDERED TEST: NORMAL
ZZ NTE WITH NAME CLEAN UP: SPECIMEN SOURCE: NORMAL

## 2018-07-28 PROCEDURE — 99232 SBSQ HOSP IP/OBS MODERATE 35: CPT | Performed by: INTERNAL MEDICINE

## 2018-07-28 PROCEDURE — 7100000001 HC PACU RECOVERY - ADDTL 15 MIN: Performed by: INTERNAL MEDICINE

## 2018-07-28 PROCEDURE — 6360000002 HC RX W HCPCS: Performed by: HOSPITALIST

## 2018-07-28 PROCEDURE — 2500000003 HC RX 250 WO HCPCS: Performed by: HOSPITALIST

## 2018-07-28 PROCEDURE — 3700000001 HC ADD 15 MINUTES (ANESTHESIA): Performed by: INTERNAL MEDICINE

## 2018-07-28 PROCEDURE — 6370000000 HC RX 637 (ALT 250 FOR IP): Performed by: STUDENT IN AN ORGANIZED HEALTH CARE EDUCATION/TRAINING PROGRAM

## 2018-07-28 PROCEDURE — 83735 ASSAY OF MAGNESIUM: CPT

## 2018-07-28 PROCEDURE — 36430 TRANSFUSION BLD/BLD COMPNT: CPT

## 2018-07-28 PROCEDURE — 2060000000 HC ICU INTERMEDIATE R&B

## 2018-07-28 PROCEDURE — 2580000003 HC RX 258: Performed by: HOSPITALIST

## 2018-07-28 PROCEDURE — 86850 RBC ANTIBODY SCREEN: CPT

## 2018-07-28 PROCEDURE — 86901 BLOOD TYPING SEROLOGIC RH(D): CPT

## 2018-07-28 PROCEDURE — 84484 ASSAY OF TROPONIN QUANT: CPT

## 2018-07-28 PROCEDURE — 2780000010 HC IMPLANT OTHER: Performed by: INTERNAL MEDICINE

## 2018-07-28 PROCEDURE — 99223 1ST HOSP IP/OBS HIGH 75: CPT | Performed by: INTERNAL MEDICINE

## 2018-07-28 PROCEDURE — 84100 ASSAY OF PHOSPHORUS: CPT

## 2018-07-28 PROCEDURE — 85610 PROTHROMBIN TIME: CPT

## 2018-07-28 PROCEDURE — 94762 N-INVAS EAR/PLS OXIMTRY CONT: CPT

## 2018-07-28 PROCEDURE — 2580000003 HC RX 258: Performed by: NURSE ANESTHETIST, CERTIFIED REGISTERED

## 2018-07-28 PROCEDURE — 7100000000 HC PACU RECOVERY - FIRST 15 MIN: Performed by: INTERNAL MEDICINE

## 2018-07-28 PROCEDURE — 36415 COLL VENOUS BLD VENIPUNCTURE: CPT

## 2018-07-28 PROCEDURE — P9040 RBC LEUKOREDUCED IRRADIATED: HCPCS

## 2018-07-28 PROCEDURE — 85018 HEMOGLOBIN: CPT

## 2018-07-28 PROCEDURE — C9113 INJ PANTOPRAZOLE SODIUM, VIA: HCPCS | Performed by: NURSE PRACTITIONER

## 2018-07-28 PROCEDURE — 43255 EGD CONTROL BLEEDING ANY: CPT | Performed by: INTERNAL MEDICINE

## 2018-07-28 PROCEDURE — 6360000002 HC RX W HCPCS: Performed by: NURSE ANESTHETIST, CERTIFIED REGISTERED

## 2018-07-28 PROCEDURE — 93005 ELECTROCARDIOGRAM TRACING: CPT

## 2018-07-28 PROCEDURE — 3700000000 HC ANESTHESIA ATTENDED CARE: Performed by: INTERNAL MEDICINE

## 2018-07-28 PROCEDURE — 6360000002 HC RX W HCPCS: Performed by: STUDENT IN AN ORGANIZED HEALTH CARE EDUCATION/TRAINING PROGRAM

## 2018-07-28 PROCEDURE — 86900 BLOOD TYPING SEROLOGIC ABO: CPT

## 2018-07-28 PROCEDURE — 94640 AIRWAY INHALATION TREATMENT: CPT

## 2018-07-28 PROCEDURE — 2580000003 HC RX 258: Performed by: STUDENT IN AN ORGANIZED HEALTH CARE EDUCATION/TRAINING PROGRAM

## 2018-07-28 PROCEDURE — 6360000002 HC RX W HCPCS: Performed by: NURSE PRACTITIONER

## 2018-07-28 PROCEDURE — 6370000000 HC RX 637 (ALT 250 FOR IP): Performed by: HOSPITALIST

## 2018-07-28 PROCEDURE — 2500000003 HC RX 250 WO HCPCS: Performed by: NURSE ANESTHETIST, CERTIFIED REGISTERED

## 2018-07-28 PROCEDURE — 80048 BASIC METABOLIC PNL TOTAL CA: CPT

## 2018-07-28 PROCEDURE — 85014 HEMATOCRIT: CPT

## 2018-07-28 PROCEDURE — 99232 SBSQ HOSP IP/OBS MODERATE 35: CPT | Performed by: NURSE PRACTITIONER

## 2018-07-28 PROCEDURE — 0W3P8ZZ CONTROL BLEEDING IN GASTROINTESTINAL TRACT, VIA NATURAL OR ARTIFICIAL OPENING ENDOSCOPIC: ICD-10-PCS | Performed by: INTERNAL MEDICINE

## 2018-07-28 PROCEDURE — 3609013000 HC EGD TRANSORAL CONTROL BLEEDING ANY METHOD: Performed by: INTERNAL MEDICINE

## 2018-07-28 PROCEDURE — 2580000003 HC RX 258: Performed by: NURSE PRACTITIONER

## 2018-07-28 PROCEDURE — 86920 COMPATIBILITY TEST SPIN: CPT

## 2018-07-28 PROCEDURE — 2720000010 HC SURG SUPPLY STERILE: Performed by: INTERNAL MEDICINE

## 2018-07-28 PROCEDURE — 6360000002 HC RX W HCPCS: Performed by: INTERNAL MEDICINE

## 2018-07-28 DEVICE — WORKING LENGTH 235CM, WORKING CHANNEL 2.8MM
Type: IMPLANTABLE DEVICE | Status: FUNCTIONAL
Brand: RESOLUTION 360 CLIP

## 2018-07-28 RX ORDER — MORPHINE SULFATE 2 MG/ML
2 INJECTION, SOLUTION INTRAMUSCULAR; INTRAVENOUS EVERY 4 HOURS PRN
Status: DISCONTINUED | OUTPATIENT
Start: 2018-07-28 | End: 2018-08-01 | Stop reason: HOSPADM

## 2018-07-28 RX ORDER — SODIUM CHLORIDE 9 MG/ML
INJECTION, SOLUTION INTRAVENOUS CONTINUOUS
Status: DISCONTINUED | OUTPATIENT
Start: 2018-07-28 | End: 2018-08-01 | Stop reason: HOSPADM

## 2018-07-28 RX ORDER — 0.9 % SODIUM CHLORIDE 0.9 %
250 INTRAVENOUS SOLUTION INTRAVENOUS ONCE
Status: COMPLETED | OUTPATIENT
Start: 2018-07-28 | End: 2018-07-28

## 2018-07-28 RX ORDER — MAGNESIUM SULFATE 1 G/100ML
1 INJECTION INTRAVENOUS ONCE
Status: COMPLETED | OUTPATIENT
Start: 2018-07-28 | End: 2018-07-28

## 2018-07-28 RX ORDER — ONDANSETRON 2 MG/ML
4 INJECTION INTRAMUSCULAR; INTRAVENOUS
Status: DISCONTINUED | OUTPATIENT
Start: 2018-07-28 | End: 2018-07-28 | Stop reason: HOSPADM

## 2018-07-28 RX ORDER — LIDOCAINE HYDROCHLORIDE 10 MG/ML
INJECTION, SOLUTION EPIDURAL; INFILTRATION; INTRACAUDAL; PERINEURAL PRN
Status: DISCONTINUED | OUTPATIENT
Start: 2018-07-28 | End: 2018-07-28 | Stop reason: SDUPTHER

## 2018-07-28 RX ORDER — ALBUTEROL SULFATE 2.5 MG/3ML
2.5 SOLUTION RESPIRATORY (INHALATION)
Status: DISCONTINUED | OUTPATIENT
Start: 2018-07-28 | End: 2018-07-30

## 2018-07-28 RX ORDER — DONEPEZIL HYDROCHLORIDE 10 MG/1
10 TABLET, FILM COATED ORAL NIGHTLY
Status: DISCONTINUED | OUTPATIENT
Start: 2018-07-28 | End: 2018-08-01 | Stop reason: HOSPADM

## 2018-07-28 RX ORDER — IPRATROPIUM BROMIDE AND ALBUTEROL SULFATE 2.5; .5 MG/3ML; MG/3ML
1 SOLUTION RESPIRATORY (INHALATION) 3 TIMES DAILY
Status: DISCONTINUED | OUTPATIENT
Start: 2018-07-29 | End: 2018-08-01 | Stop reason: HOSPADM

## 2018-07-28 RX ORDER — DIPHENHYDRAMINE HYDROCHLORIDE 50 MG/ML
12.5 INJECTION INTRAMUSCULAR; INTRAVENOUS
Status: DISCONTINUED | OUTPATIENT
Start: 2018-07-28 | End: 2018-07-28 | Stop reason: HOSPADM

## 2018-07-28 RX ORDER — SODIUM CHLORIDE 9 MG/ML
INJECTION, SOLUTION INTRAVENOUS CONTINUOUS
Status: DISCONTINUED | OUTPATIENT
Start: 2018-07-28 | End: 2018-07-28

## 2018-07-28 RX ORDER — 0.9 % SODIUM CHLORIDE 0.9 %
250 INTRAVENOUS SOLUTION INTRAVENOUS ONCE
Status: DISCONTINUED | OUTPATIENT
Start: 2018-07-28 | End: 2018-07-28

## 2018-07-28 RX ORDER — PROPOFOL 10 MG/ML
INJECTION, EMULSION INTRAVENOUS PRN
Status: DISCONTINUED | OUTPATIENT
Start: 2018-07-28 | End: 2018-07-28 | Stop reason: SDUPTHER

## 2018-07-28 RX ORDER — EPINEPHRINE 1 MG/ML
INJECTION, SOLUTION, CONCENTRATE INTRAVENOUS PRN
Status: DISCONTINUED | OUTPATIENT
Start: 2018-07-28 | End: 2018-07-28 | Stop reason: HOSPADM

## 2018-07-28 RX ORDER — SUCCINYLCHOLINE/SOD CL,ISO/PF 100 MG/5ML
SYRINGE (ML) INTRAVENOUS PRN
Status: DISCONTINUED | OUTPATIENT
Start: 2018-07-28 | End: 2018-07-28 | Stop reason: SDUPTHER

## 2018-07-28 RX ORDER — SODIUM CHLORIDE, SODIUM LACTATE, POTASSIUM CHLORIDE, CALCIUM CHLORIDE 600; 310; 30; 20 MG/100ML; MG/100ML; MG/100ML; MG/100ML
INJECTION, SOLUTION INTRAVENOUS CONTINUOUS PRN
Status: DISCONTINUED | OUTPATIENT
Start: 2018-07-28 | End: 2018-07-28 | Stop reason: SDUPTHER

## 2018-07-28 RX ORDER — PROMETHAZINE HYDROCHLORIDE 25 MG/ML
6.25 INJECTION, SOLUTION INTRAMUSCULAR; INTRAVENOUS EVERY 6 HOURS PRN
Status: DISCONTINUED | OUTPATIENT
Start: 2018-07-28 | End: 2018-08-01 | Stop reason: HOSPADM

## 2018-07-28 RX ORDER — LABETALOL HYDROCHLORIDE 5 MG/ML
5 INJECTION, SOLUTION INTRAVENOUS EVERY 10 MIN PRN
Status: DISCONTINUED | OUTPATIENT
Start: 2018-07-28 | End: 2018-07-28 | Stop reason: HOSPADM

## 2018-07-28 RX ADMIN — METOPROLOL TARTRATE 5 MG: 5 INJECTION, SOLUTION INTRAVENOUS at 03:47

## 2018-07-28 RX ADMIN — MORPHINE SULFATE 2 MG: 2 INJECTION, SOLUTION INTRAMUSCULAR; INTRAVENOUS at 17:40

## 2018-07-28 RX ADMIN — Medication 100 MG: at 11:28

## 2018-07-28 RX ADMIN — MORPHINE SULFATE 2 MG: 2 INJECTION, SOLUTION INTRAMUSCULAR; INTRAVENOUS at 23:01

## 2018-07-28 RX ADMIN — SODIUM CHLORIDE: 9 INJECTION, SOLUTION INTRAVENOUS at 18:21

## 2018-07-28 RX ADMIN — SODIUM CHLORIDE 250 ML: 9 INJECTION, SOLUTION INTRAVENOUS at 04:33

## 2018-07-28 RX ADMIN — PANTOPRAZOLE SODIUM 40 MG: 40 TABLET, DELAYED RELEASE ORAL at 00:33

## 2018-07-28 RX ADMIN — CLINDAMYCIN PHOSPHATE 600 MG: 600 INJECTION, SOLUTION INTRAVENOUS at 18:20

## 2018-07-28 RX ADMIN — Medication 10 ML: at 22:49

## 2018-07-28 RX ADMIN — QUETIAPINE FUMARATE 12.5 MG: 25 TABLET ORAL at 22:37

## 2018-07-28 RX ADMIN — SODIUM CHLORIDE 80 MG: 9 INJECTION, SOLUTION INTRAVENOUS at 10:27

## 2018-07-28 RX ADMIN — SODIUM CHLORIDE 8 MG/HR: 9 INJECTION, SOLUTION INTRAVENOUS at 10:20

## 2018-07-28 RX ADMIN — MOMETASONE FUROATE AND FORMOTEROL FUMARATE DIHYDRATE 2 PUFF: 200; 5 AEROSOL RESPIRATORY (INHALATION) at 10:09

## 2018-07-28 RX ADMIN — COLCHICINE 0.6 MG: 0.6 TABLET, FILM COATED ORAL at 22:38

## 2018-07-28 RX ADMIN — LIDOCAINE HYDROCHLORIDE 100 MG: 10 INJECTION, SOLUTION EPIDURAL; INFILTRATION; INTRACAUDAL at 11:28

## 2018-07-28 RX ADMIN — SODIUM CHLORIDE, POTASSIUM CHLORIDE, SODIUM LACTATE AND CALCIUM CHLORIDE: 600; 310; 30; 20 INJECTION, SOLUTION INTRAVENOUS at 11:18

## 2018-07-28 RX ADMIN — HYDROCODONE BITARTRATE AND ACETAMINOPHEN 1 TABLET: 5; 325 TABLET ORAL at 03:22

## 2018-07-28 RX ADMIN — PROMETHAZINE HYDROCHLORIDE 6.25 MG: 25 INJECTION INTRAMUSCULAR; INTRAVENOUS at 03:51

## 2018-07-28 RX ADMIN — CLINDAMYCIN PHOSPHATE 600 MG: 600 INJECTION, SOLUTION INTRAVENOUS at 06:03

## 2018-07-28 RX ADMIN — MOMETASONE FUROATE AND FORMOTEROL FUMARATE DIHYDRATE 2 PUFF: 200; 5 AEROSOL RESPIRATORY (INHALATION) at 22:19

## 2018-07-28 RX ADMIN — MAGNESIUM SULFATE HEPTAHYDRATE 1 G: 1 INJECTION, SOLUTION INTRAVENOUS at 09:05

## 2018-07-28 RX ADMIN — LEVOFLOXACIN 750 MG: 5 INJECTION, SOLUTION INTRAVENOUS at 15:30

## 2018-07-28 RX ADMIN — SODIUM CHLORIDE, POTASSIUM CHLORIDE, SODIUM LACTATE AND CALCIUM CHLORIDE: 600; 310; 30; 20 INJECTION, SOLUTION INTRAVENOUS at 11:28

## 2018-07-28 RX ADMIN — PROPOFOL 50 MG: 10 INJECTION, EMULSION INTRAVENOUS at 11:28

## 2018-07-28 ASSESSMENT — PAIN SCALES - GENERAL
PAINLEVEL_OUTOF10: 7
PAINLEVEL_OUTOF10: 7
PAINLEVEL_OUTOF10: 1
PAINLEVEL_OUTOF10: 0
PAINLEVEL_OUTOF10: 0
PAINLEVEL_OUTOF10: 7
PAINLEVEL_OUTOF10: 1
PAINLEVEL_OUTOF10: 6

## 2018-07-28 ASSESSMENT — PULMONARY FUNCTION TESTS
PIF_VALUE: 22
PIF_VALUE: 7
PIF_VALUE: 21
PIF_VALUE: 4
PIF_VALUE: 13
PIF_VALUE: 0
PIF_VALUE: 1
PIF_VALUE: 1
PIF_VALUE: 18
PIF_VALUE: 0
PIF_VALUE: 0
PIF_VALUE: 5
PIF_VALUE: 0
PIF_VALUE: 1
PIF_VALUE: 21
PIF_VALUE: 3
PIF_VALUE: 0
PIF_VALUE: 1
PIF_VALUE: 7
PIF_VALUE: 1
PIF_VALUE: 0
PIF_VALUE: 1
PIF_VALUE: 0
PIF_VALUE: 3
PIF_VALUE: 22
PIF_VALUE: 3
PIF_VALUE: 4
PIF_VALUE: 1
PIF_VALUE: 1
PIF_VALUE: 2
PIF_VALUE: 0
PIF_VALUE: 22
PIF_VALUE: 21
PIF_VALUE: 24
PIF_VALUE: 17
PIF_VALUE: 1
PIF_VALUE: 22
PIF_VALUE: 3
PIF_VALUE: 1
PIF_VALUE: 6
PIF_VALUE: 2
PIF_VALUE: 0
PIF_VALUE: 22
PIF_VALUE: 0
PIF_VALUE: 17

## 2018-07-28 ASSESSMENT — PAIN SCALES - WONG BAKER: WONGBAKER_NUMERICALRESPONSE: 0

## 2018-07-28 ASSESSMENT — LIFESTYLE VARIABLES: SMOKING_STATUS: 0

## 2018-07-28 NOTE — PROCEDURES
[facility]                   [address] [address], [place] [address]                            ELECTROENCEPHALOGRAM REPORT    PATIENT NAME: Flakito Burdick                        :        10/26/1931  MED REC NO:   [id number]                             ROOM:       3027  ACCOUNT NO:   [de-identified]                           ADMIT DATE: 2018  PROVIDER:     Ivan Alvarez    DATE OF EE2018    HISTORY:  This is an 80-year-old male with dementia and recent fall; has  been having altered mentation and being evaluated for possible seizures. MEDICATIONS:  Not listed. DESCRIPTION OF PROCEDURE:  Electrodes were applied using paste in positions  dictated by the International 10-20 System of placement. Reviewing  montages included both referential and bipolar derivations. In addition to  EEG data, EKG and eye movements were recorded. This is a routine  recording. This test was performed on 2018. DESCRIPTION OF ACTIVITIES:  At the onset of study, the patient is drowsy  with background demonstrating blink artifacts. Photic stimulation did not  induce posterior driving responses. As the patient is alerted for the  study, background activity demonstrated 7-8 Hz theta activity. Sleep was  not recorded. Hyperventilation was attempted and could not be performed as  the patient was unable to follow instructions. Photic stimulation did not  induce posterior driving responses. ELECTRODIAGNOSTIC INTERPRETATION:  This EEG performed during wakefulness  and drowsiness is abnormal, demonstrating mild slowing in theta frequencies  compatible with mild encephalopathy. This could be due to underlying  diffuse cortical disease, dementing illness. No epileptiform discharges noted. Electrocardiogram montage revealed NSR sinus rhythm. Clinical correlation is recommended.         Sánchez Dudley    D: 2018 15:57:50       T: 2018 71:12:42     [email or web address]  Job#: [id number]     Doc#: [id number]    CC:

## 2018-07-28 NOTE — PROGRESS NOTES
[address] [facility].    PROGRESS NOTES            Date:   7/28/2018  Patient name:  Joe Hutton  Date of admission:  7/25/2018  2:36 PM  MRN:   [id number]  YOB: 1931    CHIEF COMPLAINT     History Obtained From:  Patient and chart review. HISTORY OF PRESENT ILLNESS      The patient is a 80 y.o.  male who is admitted to the hospital for altered mental status, patient was seen here last admission for syncope and fall. At that time he had b/l sacral fractures and lumbar fracture which was treated conservatively by orthopedics. Has baseline dementia donepezil was discontinued in view of confusion and fall. All pain and sleep medications were minimized. And sent to SNF. This admission CXR shows Rt sided infiltrate denies fever or chills or any complaints at this time. Concern for aspiration. Passed bedside swallow evaluation, awaiting speech evaluation. Started on Levaquin and clindamycin. Ortho consulted per family request.  PMH of AD, Afib on Xeralto and HTN, Vit D deficiency. 7/28    Patient seen chart reviewed. Had blood per rectum last night HB dropped from 11.7 to 7.8  NPO GI consulted. Vitally stable afebrile. HR in 120 received IV lopressor  Xeralto withheld. PAST MEDICAL HISTORY       has a past medical history of Atrial fibrillation ([facility] [address] Cancer [facility]); COPD (chronic obstructive pulmonary disease) ([facility] [address] and Hernia, inguinal, bilateral.    PAST SURGICAL HISTORY       has a past surgical history that includes Small intestine surgery; vascular surgery (Bilateral, 05/09/2014); Cataract removal; Colonoscopy; Colonoscopy (5/12/15); Appendectomy; and Cardiac surgery (Right, 5-29-15). HOME MEDICATIONS        Prior to Admission medications    Medication Sig Start Date End Date Taking? Authorizing Provider   zolpidem (AMBIEN) 10 MG tablet Take 10 mg by mouth nightly as needed for Sleep. .   Yes Historical splenomegaly     DIAGNOSTICS      Laboratory Testing:  CBC:   Recent Labs      07/27/18   0637   07/28/18   0403   WBC  10.4   --    --    HGB  11.8*   < >  9.2*   PLT  357   --    --     < > = values in this interval not displayed. BMP:    Recent Labs      07/26/18   0548  07/27/18   0637  07/28/18   0403   NA  137  134*  138   K  4.5  3.9  4.3   CL  99  97*  103   CO2  26  22  23   BUN  23  18  30*   CREATININE  0.72  0.62*  0.71   GLUCOSE  92  88  100*     S. Calcium:  Recent Labs      07/28/18   0403   CALCIUM  8.0*     INR:   Recent Labs      07/28/18   0403   INR  1.3     Hepatic functions:   Recent Labs      07/25/18   1509   ALKPHOS  111   ALT  15   AST  30   PROT  7.2   BILITOT  0.71   LABALBU  3.4*       Thyroid functions:   Lab Results   Component Value Date    TSH 1.28 06/17/2015        Imaging/Diagonstics:  Xr Pelvis (1-2 Views)    Result Date: 7/14/2018  EXAMINATION: SINGLE XRAY VIEW OF THE PELVIS 7/14/2018 10:25 am COMPARISON: None. HISTORY: ORDERING SYSTEM PROVIDED HISTORY: Trauma/Fracture TECHNOLOGIST PROVIDED HISTORY: AP, Inlet and Outlet views please, thank you. Reason for exam:->Trauma/Fracture Ordering Physician Provided Reason for Exam: sacral fx,    inlet outlet views Acuity: Acute Type of Exam: Unknown FINDINGS: Bones are osteoporotic. Small nondisplaced sacral fractures demonstrated on CT are not clearly demonstrated radiographically. Other portions of the pelvis appear unremarkable. Bones are osteoporotic. Small nondisplaced sacral fractures demonstrated on CT are not clearly demonstrated radiographically. Xr Pelvis (1-2 Views)    Result Date: 7/14/2018  EXAMINATION: SINGLE XRAY VIEW OF THE PELVIS 7/14/2018 9:43 am COMPARISON: None. HISTORY: ORDERING SYSTEM PROVIDED HISTORY: SACRAL FRACTURE TECHNOLOGIST PROVIDED HISTORY: Reason for exam:->SACRAL FRACTURE FINDINGS: Bilateral sacral fractures are not as well visualized as on prior CT.   Right and left hemipelvis appear intact articulating normally at the SI joints and pubic symphysis. Visualized portions of the proximal right and left femur appear unremarkable. Poor visualization of bilateral sacral fractures seen on CT pelvis. Correlate with CT pelvis. Xr Abdomen (kub) (single Ap View)    Result Date: 7/15/2018  EXAMINATION: SINGLE SUPINE XRAY VIEW(S) OF THE ABDOMEN 7/15/2018 8:15 pm COMPARISON: March 3, 2015 HISTORY: ORDERING SYSTEM PROVIDED HISTORY: pain while urination, UA negative for UTI, recent fall with non displaced sacral fractures TECHNOLOGIST PROVIDED HISTORY: Reason for exam:->pain while urination, UA negative for UTI, recent fall with non displaced sacral fractures FINDINGS: No abnormally dilated loops of stool are seen. Mild gaseous distention of colon. No abnormal calcifications. Advanced lumbar spine degenerative changes are seen. Moderate-severe left hip osteoarthritis. No abnormally dilated loops of small bowel. Mild gaseous distention of colon. Ct Head Wo Contrast    Result Date: 7/25/2018  EXAMINATION: CT OF THE HEAD WITHOUT CONTRAST  7/25/2018 5:31 pm TECHNIQUE: CT of the head was performed without the administration of intravenous contrast. Dose modulation, iterative reconstruction, and/or weight based adjustment of the mA/kV was utilized to reduce the radiation dose to as low as reasonably achievable. COMPARISON: 14 July 2018 HISTORY: ORDERING SYSTEM PROVIDED HISTORY: [facility] TECHNOLOGIST PROVIDED HISTORY: Has a \"code stroke\" or \"stroke alert\" been called? ->No FINDINGS: BRAIN/VENTRICLES: There is no acute intracranial hemorrhage, mass effect or midline shift. No abnormal extra-axial fluid collection. The gray-white differentiation is maintained without evidence of an acute infarct. There is no evidence of hydrocephalus. Vertebral and internal carotid arteries are heavily calcified. Scattered hypodensity is present in the white matter compatible with chronic white matter microvascular change. ORBITS: The visualized portion of the orbits demonstrate no acute abnormality. SINUSES: The visualized paranasal sinuses and mastoid air cells demonstrate no acute abnormality. SOFT TISSUES/SKULL:  No acute abnormality of the visualized skull or soft tissues. Estimated biologic radiation dose for this procedure:1241.39 mGy/cm2. No acute intracranial abnormality. Senescent changes including chronic microvascular change. Atherosclerotic disease of the major intracranial vessels. Ct Head Wo Contrast    Result Date: 7/14/2018  EXAMINATION: CT OF THE HEAD WITHOUT CONTRAST; CT OF THE CERVICAL SPINE WITHOUT CONTRAST 7/14/2018 8:27 am TECHNIQUE: CT of the head was performed without the administration of intravenous contrast. Dose modulation, iterative reconstruction, and/or weight based adjustment of the mA/kV was utilized to reduce the radiation dose to as low as reasonably achievable.; CT of the cervical spine was performed without the administration of intravenous contrast. Multiplanar reformatted images are provided for review. Dose modulation, iterative reconstruction, and/or weight based adjustment of the mA/kV was utilized to reduce the radiation dose to as low as reasonably achievable. COMPARISON: None. HISTORY: ORDERING SYSTEM PROVIDED HISTORY: fall, on xarelto Headache and neck pain following trauma CT HEAD FINDINGS: BRAIN/VENTRICLES: There is no acute intracranial hemorrhage, mass effect or midline shift. No abnormal extra-axial fluid collection. The gray-white differentiation is maintained without evidence of an acute infarct. There is prominence of the ventricles and sulci due to global parenchymal volume loss. There are nonspecific areas of hypoattenuation within the periventricular and subcortical white matter, which likely represent chronic microvascular ischemic change. ORBITS: The visualized portion of the orbits demonstrate no acute abnormality.  SINUSES: The visualized paranasal sinuses and iterative reconstruction, and/or weight based adjustment of the mA/kV was utilized to reduce the radiation dose to as low as reasonably achievable. COMPARISON: None. HISTORY: ORDERING SYSTEM PROVIDED HISTORY: fall, on xarelto Headache and neck pain following trauma CT HEAD FINDINGS: BRAIN/VENTRICLES: There is no acute intracranial hemorrhage, mass effect or midline shift. No abnormal extra-axial fluid collection. The gray-white differentiation is maintained without evidence of an acute infarct. There is prominence of the ventricles and sulci due to global parenchymal volume loss. There are nonspecific areas of hypoattenuation within the periventricular and subcortical white matter, which likely represent chronic microvascular ischemic change. ORBITS: The visualized portion of the orbits demonstrate no acute abnormality. SINUSES: The visualized paranasal sinuses and mastoid air cells demonstrate no acute abnormality. SOFT TISSUES/SKULL: No acute abnormality of the visualized skull or soft tissues. Calcifications involving bilateral carotid vasculature reflect calcific atherosclerosis. CT CERVICAL SPINE FINDINGS: BONES/ALIGNMENT: 7 typical cervical vertebra present maintain normal height and alignment. DEGENERATIVE CHANGES: Degenerative disc disease and facet and uncovertebral joint arthrosis predominate mid and lower cervical spine. No canal stenosis or significant neural foraminal narrowing is seen. SOFT TISSUES: Soft tissue structures surrounding the cervical spine appear unremarkable. Visualized portions of the upper hemithoraces, skullbase and posterior fossa contents appear unremarkable. Calcifications involving bilateral carotid vasculature reflect calcific atherosclerosis. Emphysematous changes are noted at the lung apices. No acute intracranial abnormality. Degenerative changes involving the cervical spine described above. No cervical fracture or acute traumatic subluxation.  Calcifications involving Contrast? None    Result Date: 7/14/2018  EXAMINATION: CT OF THE PELVIS WITHOUT CONTRAST 7/14/2018 7:37 am TECHNIQUE: CT of the pelvis was performed without the administration of intravenous contrast.  Multiplanar reformatted images are provided for review. Dose modulation, iterative reconstruction, and/or weight based adjustment of the mA/kV was utilized to reduce the radiation dose to as low as reasonably achievable. COMPARISON: CT abdomen and pelvis from 06/05/2017 HISTORY: ORDERING SYSTEM PROVIDED HISTORY: pelvic, right hip pain s/p fall TECHNOLOGIST PROVIDED HISTORY: Additional Contrast?->None 80year-old male with right hip pain/pelvis after a fall FINDINGS: Both femoral heads are properly located within the bilateral acetabula. 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 There is subchondral fracturing and associated mild subchondral collapse of the superior aspect of the left femoral head with subjacent subcortical cystic changes. Moderate narrowing of the left hip joint space with associated mild marginal osteophyte spurring. Mild to moderate narrowing of the right hip joint space with associated mild osteophyte spurring. Diffuse osteopenia. Mild degenerative changes in the lower lumbar/lumbosacral spine. Atherosclerotic calcification of the abdominal aorta and iliac branch vasculature. There is also an acute fracture through the anterior aspect of the S2 level with disruption of the cortex on image 94, series 602. There is disruption of the cortex of the anterior sacral ala bilaterally best appreciated on image 30, series 2 likely representing underlying sacral insufficiency fractures. Nondisplaced fracture of the left L5 transverse process on image 11, series 2. Mild degenerative changes of the pubic symphysis. Moderate stool burden. Moderate rectosigmoid and sigmoid colonic diverticulosis. Enlarged prostate with associated calcifications. Correlate with PSA.      1. Acute fracture CT is recommended. ASSESSMENT     Principal Problem:    Community acquired pneumonia of right lower lobe of lung ([facility] [address]  Active Problems:    COPD (chronic obstructive pulmonary disease) (HCC)    Atrial fibrillation (HCC)    HLD (hyperlipidemia)    Altered mental state    Aspiration pneumonia (HCC)    Sacral fracture (HCC)    Metabolic encephalopathy    Vitamin D deficiency  Resolved Problems:    * No resolved hospital problems. *        PLAN      1. Patient with confusion and baseline dementia, CXR shows b/l infiltrates. With no other symptoms, concern for aspiration. Video swallow done on pureed diet. 2. Neurology on board EEG to r/o encephalopathy. 3. Aspiration PNA on  IV Clindamycin and Levaquin, sputum culture, strep pna ag and legionella ag, negative. 4. GI consulted for blood per rectum , PPI gtt, NPO Hb dropped from 11.8 to 7.8 H n H every 8 hrs, Transfuse if Hb below 7.  5. A-fib Toprol, prn lopressor IV 5 mg, Xarelto withheld. 6. Vit D deficiency on replacement. 7. B/L sacral fractures pain management with IV Toradol and norco q 6 hr prn  8. Fall precautions, aspiration precautions. 9. Neurology consulted for confusion, await recommendations. 10. 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 11.  PT OT consulted. MD HEMANT Santos [address], [address] [place] [address]. Phone [phone]   Fax: [phone]  Answering Service: [phone]      IM Attending    Pt seen and examined 7/28   I have discussed the care of pt , including pertinent history and exam findings,  with the resident. I have reviewed the key elements of all parts of the encounter with the resident. I agree with the assessment, plan and orders as documented by the resident.     Electronically signed by Irish Mays MD

## 2018-07-28 NOTE — OP NOTE
[facility]     PROCEDURE DATE: 07/28/18    REFERRING PHYSICIAN: No ref. provider found     PRIMARY CARE PROVIDER: Ramon Mejias MD    ATTENDING PHYSICIAN: Alexandra Polanco MD     HISTORY: Mr. Yue Avila is a 80 y.o. male who presents to the [place] [address] Endoscopy unit for upper endoscopy. The patient's clinical history is remarkable for melena and acute anemia. He is currently medically stable and appropriate for the planned procedure. PREOPERATIVE DIAGNOSIS: melena and acute anemia. PROCEDURES:   1) Transoral Upper Endoscopy, hemostasis (epinephrine injection, BiCap cautery, placement of 2 hemoclips). POSTOPERATIVE DIAGNOSIS:   Duodenal ulcers     MEDICATIONS:   MAC per anesthesia    INSTRUMENT: Olympus GIF-H190 flexible Gastroscope. PREPARATION: The nature and character of the procedure as well as risks, benefits, and alternatives were discussed with the patient and informed consent was obtained. Complications were said to include, but were not limited to: medication allergy, medication reaction, cardiovascular and respiratory problems, bleeding, perforation, infection, and/or missed diagnosis. Following arrival in the endoscopy room, the patient was placed in the left lateral decubitus position and final time-out accomplished in the presence of the nursing staff. Baseline vital signs were obtained and reviewed, and IV sedation was subsequently initiated. FINDINGS:   Esophagus: The esophagus was inspected to the Z-line. The endoscopic exam showed no pathology. Stomach: The stomach was inspected in both forward and retroflex fashion and was appropriately distensible. The cardia, fundus, incisura, antrum and pylorus were identified via direct visualization. The endoscopic exam showed no pathology. Duodenum: The proximal small bowel was inspected through the bulb, sweep, and second portion of the duodenum. The endoscopic exam showed 3 ulcers in bulb.  Largest was 10 mm with visible vessel. 4 ml of 1:10,000 epinephrine was injected at edges of the ulcer. BiCap catheter was used to cauterize the ulcer base. Further 2 hemoclips were placed over the ulcer bed. RECOMMENDATIONS:   1) Transfer back to the floor for further management as per primary care team.   2) PPI drip x 72 hours. 3) NPO today. 4) Monitor H&H.  5) Keep holding blood thinners.         Alexandra Alba

## 2018-07-28 NOTE — CONSULTS
THE [facility]  Consultation Note     . REASON FOR CONSULTATION:  Rectal bleed    HISTORY OF PRESENT ILLNESS:     This is a 80 y.o. male who presented originally with AMS, extremities tremors and dehydration from ECF. He had recently been hospitalized after a fall at home resulting in pelvic/sacral fracture and pneumonia that required placement. Pt has a significant h/o dementia, Afib (on Xarelto-last dose yesterday am), and 50 plus yrs ago he had a right partial colectomy due to tubular adenoma of the right colon. Overnight, pt developed painless melena. His Hgb was 12.3 upon admission and this am after 2 episodes of melena, it dropped to 7.8. Upon arrival for assessment, pt was found to have yet the 3rd episode of dark red melena of large quantity. Pt denied any pain, nausea, vomiting or cramping. Daughter at bedside and confirmed above information. Per chart review, pt had last colonoscopy 5/2015 per Dr. Venkat Harding indicating pervious ileocolonic anastomotic ulceration. Per office notes there was concern for possible IBD due to repeated episodes of Diverticulosis on CT scans and pt was to have repeat colonoscopy in 3 yrs (2018). 5/12/2015 Per Dr. Venkat Harding:  Findings:  Pt appears to have surgery and ileocolonic anastomosis. At the anastomosis , pt has ulceration -biopsies taken , small bowel examined for 2 to 3 inches and no stricture noted     Cecum/Ascending colon: part of the Rt. Colon was removed     Transverse colon: normal     Descending/Sigmoid colon: normal     Rectum/Anus: examined in normal and retroflexed positions and was abnormal: 7 mm ,flat polyp removed with snare and cautery Tq  PAST MEDICAL HISTORY:  Past Medical History:   Diagnosis Date    Atrial fibrillation [facility]) 5/914 12/13/13  - heart ablation of dysrhythmia    [facility] ([facility] [address]     COPD (chronic obstructive pulmonary disease) ([facility] [address]     Hernia, inguinal, bilateral     rt.      Past Surgical History: Procedure Laterality Date    APPENDECTOMY      CARDIAC SURGERY Right 5-29-15    inguinal hernia with mesh    CATARACT REMOVAL      COLONOSCOPY      COLONOSCOPY  5/12/15    ulceration at the anastomosis with the small bowel-pathology acute and chronic inflammation, rectal polyp-tubular adenoma    SMALL INTESTINE SURGERY      VASCULAR SURGERY Bilateral 05/09/2014    Bilateral temporal artery biopsy       ALLERGIES:  Allergies   Allergen Reactions    Codeine Other (See Comments)     Upset stomach    Fentanyl      Aggitated, confused    Pcn [Penicillins] Other (See Comments)     Upset stomch       HOME MEDICATIONS:  Prior to Admission medications    Medication Sig Start Date End Date Taking? Authorizing Provider   zolpidem (AMBIEN) 10 MG tablet Take 10 mg by mouth nightly as needed for Sleep. .   Yes Historical Provider, MD   calcium carbonate 1500 (600 Ca) MG TABS tablet Take 1 tablet by mouth daily  Patient taking differently: Take 600 mg by mouth daily Pt does not take 7/19/18  Yes Neisha Knox MD   vitamin D (ERGOCALCIFEROL) 59043 units CAPS capsule Take 1 capsule by mouth once a week for 11 doses 7/18/18 9/27/18 Yes Neisha Knox MD   COMBIVENT RESPIMAT  MCG/ACT AERS inhaler INHALE 1 PUFF 4 TIMES A DAY AS DIRECTED 12/26/17  Yes Anuja Gee MD   ipratropium (ATROVENT) 0.06 % nasal spray Pt does not take 11/9/16  Yes Historical Provider, MD   XARELTO 20 MG TABS tablet 20 mg daily (with breakfast)  11/9/16  Yes Historical Provider, MD   dextromethorphan-guaiFENesin (ROBITUSSIN-DM)  MG/5ML syrup Take 10 mLs by mouth every 6 hours as needed for Cough.  3/10/15  Yes Pastora Mcrae MD   budesonide-formoterol (SYMBICORT) 160-4.5 MCG/ACT AERO Inhale 2 puffs into the lungs 2 times daily Pt does not take   Yes Historical Provider, MD   tamsulosin (FLOMAX) 0.4 MG capsule Take 0.3 mg by mouth daily    Yes Historical Provider, MD   metoprolol (TOPROL-XL) 25 MG XL tablet Take 12.5 mg by mouth 2

## 2018-07-28 NOTE — PROGRESS NOTES
Neurology Nurse Practitioner Progress Note      INTERVAL HISTORY: This is a 80 y.o.  male admitted 7/25/2018 for AMS & cough. This is a follow-up neurology progress note. The patient was examined and the chart was reviewed. Discussed with the RN & family at the bed side. Answered all questions. There were no acute events overnight. No new c/o. HPI: Washington Dawkins is a 80 y.o. male with H/O A fib (on AC), chronic diarrhea/loose stool due to R partial colectomy due to tubular adenoma (>50 yrs ago), COPD, early dementia, who was admitted 7/25/2018 for AMS & cough. According to the medical records & family, pt was brought from [facility] in [place], [place] for [address]. Pt has H/O early dementia & has been on Aricept 10 mg HS for >1 yr. Family reported that the pt had a fall on 7/14/18 that resulted in b/l sacral fractures, he didn't hit his head; he was on Toradol & Flexeril at the SNF. Family noticed that the pt was getting increasingly confused, intermittently didn't know who he was speaking to, was having visual hallucinations. Pt also had worsening productive cough along with new onset tremors in all limbs. CXR - R sided infiltrates; was started on ATBs. Neurology was consulted for AMS & EEG to r/o seizures. On examination, he was stuporous & disoriented. CT head - chronic changes. EEG - mild slowing. Had swallow study; is on pureed diet. During his stay, he developed melena; had endoscopy today (7/28) that showed 3 duodenal ulcers.      [MAR Hold] sodium chloride  250 mL Intravenous Once    [MAR Hold] calcium carbonate  600 mg Oral Daily    [MAR Hold] memantine  5 mg Oral Daily    [MAR Hold] metoprolol succinate  25 mg Oral Daily    [MAR Hold] sodium chloride  500 mL Intravenous Once    [MAR Hold] sodium chloride flush  10 mL Intravenous 2 times per day    [MAR Hold] mometasone-formoterol  2 puff Inhalation BID    [MAR Hold] colchicine  0.6 mg Oral BID    [MAR Hold] tamsulosin  0.4 mg Oral Daily    [facility] Hold] clindamycin (CLEOCIN) IV  600 mg Intravenous Q8H    [MAR Hold] levofloxacin  750 mg Intravenous Q24H    [MAR Hold] QUEtiapine  12.5 mg Oral Nightly    [MAR Hold] vitamin D  50,000 Units Oral Weekly       Past Medical History:   Diagnosis Date    Atrial fibrillation [facility]) 5/914 12/13/13  - heart ablation of dysrhythmia    Cancer ([facility] [address]     COPD (chronic obstructive pulmonary disease) (HCC)     Hernia, inguinal, bilateral     rt. Past Surgical History:   Procedure Laterality Date    APPENDECTOMY      CARDIAC SURGERY Right 5-29-15    inguinal hernia with mesh    CATARACT REMOVAL      COLONOSCOPY      COLONOSCOPY  5/12/15    ulceration at the anastomosis with the small bowel-pathology acute and chronic inflammation, rectal polyp-tubular adenoma    SMALL INTESTINE SURGERY      VASCULAR SURGERY Bilateral 05/09/2014    Bilateral temporal artery biopsy       PHYSICAL EXAM:      Blood pressure (!) 156/66, pulse 115, temperature 98.4 °F (36.9 °C), temperature source Temporal, resp. rate 25, height 5' 6.93\" (1.7 m), weight 135 lb (61.2 kg), SpO2 100 %.       Limited Neurological Examination:  Pt just came back from endoscopy so he was very sleepy; kept his eyes closed other than opening for a second when his daughter asked him to; replied a couple of questions with yes/no appropriately   Didn't let me shine light to check his pupils  Face appeared symmetrical  Hearing intact  Neck supple  Tried to squeeze both hands on command; wiggled his toes  DTRs + 1 intact; R plantar - equivocal          DATA      Lab Results   Component Value Date    WBC 10.4 07/27/2018    HGB 7.8 (L) 07/28/2018    HCT 25.0 (L) 07/28/2018     07/27/2018    CHOL 149 06/17/2015    TRIG 82 06/17/2015    HDL 85 06/17/2015    ALT 15 07/25/2018    AST 30 07/25/2018     07/28/2018    K 4.3 07/28/2018     07/28/2018    CREATININE 0.71 07/28/2018    BUN 30 (H) 07/28/2018    CO2 23 07/28/2018    TSH 1.28 06/17/2015 PSA 2.94 07/15/2018    INR 1.3 07/28/2018    RQPXYALP23 321 06/17/2015    FOLATE 12.2 06/17/2015    LABA1C 6.0 03/09/2015 4/30/2018   06:55 7/25/2018   15:09 7/25/2018   22:13   CRP 45.7 (H) 113.6 (H)    ESR   28 (H)           CT HEAD (7/25/2018):   Chronic microvascular change. Atherosclerotic disease of major intracranial vessels. ECHO (7/17/2018): EF 50-55%. LA is moderately dilated with increased volume. Mild AI & MR. Moderate TR    EEG (7/27/2018): Mild slowing in theta frequencies compatible with mild encephalopathy. This could be due to underlying diffuse cortical disease, such as dementing illness    VIDEO SWALLOW STUDY (7/26/2018): Aspiration of thick & thin liquid. On pureed diet                        IMPRESSION & PLAN: 80 y.o.  male admitted with  Delirium superimposed on early dementia in the setting of pneumonia, recent fall, poor sleep & electrolyte imbalance. Pt also has H/O chronic diarrhea/loose stool due to R partial colectomy due to tubular adenoma (>50 yrs ago). Pt is on Seroquel 12.5 mg HS. Pt just came back from endoscopy so he was very sleepy; kept his eyes closed mostly; but replied a couple of questions with yes/no appropriately      Aspiration pneumonia; is on clindamycin & Levaquin    A fib; Xarelto held due to melena    Melena; s/p endoscopy that showed 3 ulcers in duodenal bulb; GI on board    Recent fall resulting in b/l sacral fractures; is on Norco 5-325 mg Q6hr PRN    Early dementia; per family pt was on Namenda in the past but didn't like it so it was switched to Aricept. Namenda D/C'd.  Aricept 10 mg HS ordered    Continue PT/OT; he walked 5' with RW (7/27)    Comorbid condition - COPD     Will follow

## 2018-07-28 NOTE — ANESTHESIA PRE PROCEDURE
1027    Followed by    pantoprazole (PROTONIX) 80 mg in sodium chloride 0.9 % 100 mL infusion  8 mg/hr Intravenous Continuous BRIANNE Ye - CNP 10 mL/hr at 07/28/18 1020 8 mg/hr at 07/28/18 1020    0.9 % sodium chloride bolus  250 mL Intravenous Once BRIANNE Ye - CNP        0.9 % sodium chloride infusion   Intravenous Continuous Anders Haro MD        calcium carbonate tablet 600 mg  600 mg Oral Daily Anders Haro MD   600 mg at 07/27/18 1122    HYDROcodone-acetaminophen (NORCO) 5-325 MG per tablet 1 tablet  1 tablet Oral Q6H PRN Anders Haro MD   1 tablet at 07/28/18 0322    memantine (NAMENDA) tablet 5 mg  5 mg Oral Daily Anders Haro MD   5 mg at 07/27/18 2291    metoprolol succinate (TOPROL XL) extended release tablet 25 mg  25 mg Oral Daily Anders Haro MD   25 mg at 07/27/18 0905    metoprolol (LOPRESSOR) injection 5 mg  5 mg Intravenous Q6H PRN Anders Haro MD   5 mg at 07/28/18 0347    0.9 % sodium chloride bolus  500 mL Intravenous Once Alex Barker MD        sodium chloride flush 0.9 % injection 10 mL  10 mL Intravenous 2 times per day Alex Barker MD   10 mL at 07/27/18 2233    sodium chloride flush 0.9 % injection 10 mL  10 mL Intravenous PRN Alex Barker MD        acetaminophen (TYLENOL) tablet 650 mg  650 mg Oral Q4H PRN Alex Barker MD        mometasone-formoterol (DULERA) 200-5 MCG/ACT inhaler 2 puff  2 puff Inhalation BID Ryan Hayward MD   2 puff at 07/28/18 1009    colchicine (COLCRYS) tablet 0.6 mg  0.6 mg Oral BID Ryan Hayward MD   0.6 mg at 07/27/18 2231    tamsulosin (FLOMAX) capsule 0.4 mg  0.4 mg Oral Daily Ryan Hayward MD   0.4 mg at 07/27/18 0905    magnesium hydroxide (MILK OF MAGNESIA) 400 MG/5ML suspension 30 mL  30 mL Oral Daily PRN Ryan Hayward MD        ondansetron (ZOFRAN) injection 4 mg  4 mg Intravenous Q6H PRN Ryan Hayward MD        0.9 % sodium chloride infusion   Intravenous Continuous Pratibha MD Shireen 100 mL/hr at 07/27/18 2337      clindamycin (CLEOCIN) 600 mg in dextrose 5 % 50 mL IVPB  600 mg Intravenous Q8H Ryan Hayward MD   Stopped at 07/28/18 9268    levofloxacin (LEVAQUIN) 750 MG/150ML infusion 750 mg  750 mg Intravenous Q24H Ryan Hayward MD   Stopped at 07/27/18 1029    QUEtiapine (SEROQUEL) tablet 12.5 mg  12.5 mg Oral Nightly Ryan Hayward MD   12.5 mg at 07/27/18 2232    vitamin D (ERGOCALCIFEROL) capsule 50,000 Units  50,000 Units Oral Weekly Ryan Medrano MD           Allergies: Allergies   Allergen Reactions    Codeine Other (See Comments)     Upset stomach    Fentanyl      Aggitated, confused    Pcn [Penicillins] Other (See Comments)     Upset stomch       Problem List:    Patient Active Problem List   Diagnosis Code    Giant cell arteritis ([facility] [address] M31.6    Syncope R55    COPD (chronic obstructive pulmonary disease) (HCC) J44.9    Atrial fibrillation (HCC) I48.91    Acute bronchitis J20.9    COPD exacerbation ([address] J44.1    HLD (hyperlipidemia) E78.5    Anemia D64.9    Tubular adenoma of colon D12.6    Anemia D64.9    Small bowel stricture K56.699    Right inguinal hernia K40.90    Diverticulitis of large intestine without perforation or abscess without bleeding K57.32    Left lower quadrant pain R10.32    Cellulitis L03.90    Syncope and collapse R55    Altered mental state R41.82    Community acquired pneumonia of right lower lobe of lung ([address] J18.1    Aspiration pneumonia ([address] J69.0    Sacral fracture ([address] T19.87AW    Metabolic encephalopathy V30.70    Vitamin D deficiency E55.9    HCAP (healthcare-associated pneumonia) J18.9       Past Medical History:        Diagnosis Date    Atrial fibrillation ([address] 5/914 12/13/13  - heart ablation of dysrhythmia    Cancer ([address]     COPD (chronic obstructive pulmonary disease) (HCC)     Hernia, inguinal, bilateral     rt.        Past Surgical History:        Procedure Laterality Date    APPENDECTOMY      CARDIAC SURGERY Right 5-29-15    inguinal hernia with mesh    CATARACT REMOVAL      COLONOSCOPY      COLONOSCOPY  5/12/15    ulceration at the anastomosis with the small bowel-pathology acute and chronic inflammation, rectal polyp-tubular adenoma    SMALL INTESTINE SURGERY      VASCULAR SURGERY Bilateral 05/09/2014    Bilateral temporal artery biopsy       Social History:    Social History   Substance Use Topics    Smoking status: Former Smoker    Smokeless tobacco: Never Used    Alcohol use 0.0 oz/week      Comment: socially                                Counseling given: Not Answered      Vital Signs (Current):   Vitals:    07/27/18 2057 07/28/18 0835 07/28/18 1006 07/28/18 1030   BP:  130/76 (!) 142/85 (!) 142/85   Pulse:  115 112 124   Resp: 25 18 24 20   Temp:  98.1 °F (36.7 °C) 98.8 °F (37.1 °C) 98.2 °F (36.8 °C)   TempSrc:  Oral Oral    SpO2:  96%     Weight:       Height:                                                  BP Readings from Last 3 Encounters:   07/28/18 (!) 142/85   07/18/18 (!) 123/59   05/21/18 127/87       NPO Status:  MN                                                                               BMI:   Wt Readings from Last 3 Encounters:   07/25/18 135 lb (61.2 kg)   07/14/18 142 lb (64.4 kg)   05/21/18 139 lb 12.8 oz (63.4 kg)     Body mass index is 21.19 kg/m².     CBC:   Lab Results   Component Value Date    WBC 10.4 07/27/2018    RBC 4.28 07/27/2018    RBC 4.32 04/20/2015    HGB 7.8 07/28/2018    HCT 25.0 07/28/2018    MCV 89.0 07/27/2018    RDW 14.8 07/27/2018     07/27/2018       CMP:   Lab Results   Component Value Date     07/28/2018    K 4.3 07/28/2018     07/28/2018    CO2 23 07/28/2018    BUN 30 07/28/2018    CREATININE 0.71 07/28/2018    GFRAA >60 07/28/2018    LABGLOM >60 07/28/2018    GLUCOSE 100 07/28/2018    PROT 7.2 07/25/2018    CALCIUM 8.0 07/28/2018    BILITOT 0.71 07/25/2018    ALKPHOS 111 07/25/2018

## 2018-07-28 NOTE — ANESTHESIA POSTPROCEDURE EVALUATION
Department of Anesthesiology  Postprocedure Note    Patient: Adrianna Benítez  MRN: [id number]  [place]: 10/26/1931  Date of evaluation: 7/28/2018  Time:  12:36 PM     Procedure Summary     Date:  07/28/18 Room / Location:  [address] OR    Anesthesia Start:  1115 Anesthesia Stop:  7599    Procedure:  EGD CONTROL HEMORRHAGE (N/A ) Diagnosis:  (Melena)    Surgeon:  Amrita Augustin MD Responsible Provider:  Karnia Sarkar MD    Anesthesia Type:  MAC ASA Status:  4          Anesthesia Type: No value filed. Kenia Phase I: Kenia Score: 7    Kenia Phase II:      Last vitals: Reviewed and per EMR flowsheets.        Anesthesia Post Evaluation    Patient location during evaluation: PACU  Patient participation: complete - patient participated  Level of consciousness: awake  Pain score: 1  Nausea & Vomiting: no vomiting  Cardiovascular status: hypertensive  Respiratory status: face mask  Hydration status: hypovolemic

## 2018-07-29 LAB
ANION GAP SERPL CALCULATED.3IONS-SCNC: 13 MMOL/L (ref 9–17)
BUN BLDV-MCNC: 15 MG/DL (ref 8–23)
BUN/CREAT BLD: ABNORMAL (ref 9–20)
CALCIUM SERPL-MCNC: 7.8 MG/DL (ref 8.6–10.4)
CHLORIDE BLD-SCNC: 102 MMOL/L (ref 98–107)
CO2: 21 MMOL/L (ref 20–31)
CREAT SERPL-MCNC: 0.62 MG/DL (ref 0.7–1.2)
GFR AFRICAN AMERICAN: >60 ML/MIN
GFR NON-AFRICAN AMERICAN: >60 ML/MIN
GFR SERPL CREATININE-BSD FRML MDRD: ABNORMAL ML/MIN/{1.73_M2}
GFR SERPL CREATININE-BSD FRML MDRD: ABNORMAL ML/MIN/{1.73_M2}
GLUCOSE BLD-MCNC: 83 MG/DL (ref 70–99)
HCT VFR BLD CALC: 27.5 % (ref 40.7–50.3)
HCT VFR BLD CALC: 30 % (ref 40.7–50.3)
HCT VFR BLD CALC: 30.8 % (ref 40.7–50.3)
HEMOGLOBIN: 8.6 G/DL (ref 13–17)
HEMOGLOBIN: 8.9 G/DL (ref 13–17)
HEMOGLOBIN: 9.6 G/DL (ref 13–17)
POTASSIUM SERPL-SCNC: 3.8 MMOL/L (ref 3.7–5.3)
SODIUM BLD-SCNC: 136 MMOL/L (ref 135–144)

## 2018-07-29 PROCEDURE — 6370000000 HC RX 637 (ALT 250 FOR IP): Performed by: HOSPITALIST

## 2018-07-29 PROCEDURE — 85018 HEMOGLOBIN: CPT

## 2018-07-29 PROCEDURE — C9113 INJ PANTOPRAZOLE SODIUM, VIA: HCPCS | Performed by: NURSE PRACTITIONER

## 2018-07-29 PROCEDURE — 6360000002 HC RX W HCPCS: Performed by: NURSE PRACTITIONER

## 2018-07-29 PROCEDURE — 99232 SBSQ HOSP IP/OBS MODERATE 35: CPT | Performed by: INTERNAL MEDICINE

## 2018-07-29 PROCEDURE — 2500000003 HC RX 250 WO HCPCS: Performed by: HOSPITALIST

## 2018-07-29 PROCEDURE — 94640 AIRWAY INHALATION TREATMENT: CPT

## 2018-07-29 PROCEDURE — 2060000000 HC ICU INTERMEDIATE R&B

## 2018-07-29 PROCEDURE — 6360000002 HC RX W HCPCS: Performed by: HOSPITALIST

## 2018-07-29 PROCEDURE — 85014 HEMATOCRIT: CPT

## 2018-07-29 PROCEDURE — 80048 BASIC METABOLIC PNL TOTAL CA: CPT

## 2018-07-29 PROCEDURE — 36415 COLL VENOUS BLD VENIPUNCTURE: CPT

## 2018-07-29 PROCEDURE — 6370000000 HC RX 637 (ALT 250 FOR IP): Performed by: NURSE PRACTITIONER

## 2018-07-29 PROCEDURE — 2580000003 HC RX 258: Performed by: NURSE PRACTITIONER

## 2018-07-29 PROCEDURE — 99232 SBSQ HOSP IP/OBS MODERATE 35: CPT | Performed by: NURSE PRACTITIONER

## 2018-07-29 RX ORDER — QUETIAPINE FUMARATE 25 MG/1
12.5 TABLET, FILM COATED ORAL NIGHTLY
Status: DISCONTINUED | OUTPATIENT
Start: 2018-07-29 | End: 2018-08-01 | Stop reason: HOSPADM

## 2018-07-29 RX ADMIN — DONEPEZIL HYDROCHLORIDE 10 MG: 10 TABLET, FILM COATED ORAL at 21:04

## 2018-07-29 RX ADMIN — IPRATROPIUM BROMIDE AND ALBUTEROL SULFATE 1 AMPULE: .5; 3 SOLUTION RESPIRATORY (INHALATION) at 10:30

## 2018-07-29 RX ADMIN — SODIUM CHLORIDE 8 MG/HR: 9 INJECTION, SOLUTION INTRAVENOUS at 01:20

## 2018-07-29 RX ADMIN — MOMETASONE FUROATE AND FORMOTEROL FUMARATE DIHYDRATE 2 PUFF: 200; 5 AEROSOL RESPIRATORY (INHALATION) at 22:25

## 2018-07-29 RX ADMIN — Medication 600 MG: at 09:21

## 2018-07-29 RX ADMIN — CLINDAMYCIN PHOSPHATE 600 MG: 600 INJECTION, SOLUTION INTRAVENOUS at 12:44

## 2018-07-29 RX ADMIN — IPRATROPIUM BROMIDE AND ALBUTEROL SULFATE 1 AMPULE: .5; 3 SOLUTION RESPIRATORY (INHALATION) at 15:14

## 2018-07-29 RX ADMIN — DONEPEZIL HYDROCHLORIDE 10 MG: 10 TABLET, FILM COATED ORAL at 00:12

## 2018-07-29 RX ADMIN — SODIUM CHLORIDE 8 MG/HR: 9 INJECTION, SOLUTION INTRAVENOUS at 10:22

## 2018-07-29 RX ADMIN — COLCHICINE 0.6 MG: 0.6 TABLET, FILM COATED ORAL at 09:21

## 2018-07-29 RX ADMIN — TAMSULOSIN HYDROCHLORIDE 0.4 MG: 0.4 CAPSULE ORAL at 09:21

## 2018-07-29 RX ADMIN — CLINDAMYCIN PHOSPHATE 600 MG: 600 INJECTION, SOLUTION INTRAVENOUS at 18:46

## 2018-07-29 RX ADMIN — METOPROLOL SUCCINATE 25 MG: 25 TABLET, EXTENDED RELEASE ORAL at 09:20

## 2018-07-29 RX ADMIN — MOMETASONE FUROATE AND FORMOTEROL FUMARATE DIHYDRATE 2 PUFF: 200; 5 AEROSOL RESPIRATORY (INHALATION) at 10:30

## 2018-07-29 RX ADMIN — LEVOFLOXACIN 750 MG: 5 INJECTION, SOLUTION INTRAVENOUS at 16:31

## 2018-07-29 RX ADMIN — MORPHINE SULFATE 2 MG: 2 INJECTION, SOLUTION INTRAMUSCULAR; INTRAVENOUS at 15:44

## 2018-07-29 RX ADMIN — IPRATROPIUM BROMIDE AND ALBUTEROL SULFATE 1 AMPULE: .5; 3 SOLUTION RESPIRATORY (INHALATION) at 22:25

## 2018-07-29 RX ADMIN — MORPHINE SULFATE 2 MG: 2 INJECTION, SOLUTION INTRAMUSCULAR; INTRAVENOUS at 09:36

## 2018-07-29 RX ADMIN — CLINDAMYCIN PHOSPHATE 600 MG: 600 INJECTION, SOLUTION INTRAVENOUS at 01:44

## 2018-07-29 RX ADMIN — COLCHICINE 0.6 MG: 0.6 TABLET, FILM COATED ORAL at 21:04

## 2018-07-29 RX ADMIN — QUETIAPINE FUMARATE 12.5 MG: 25 TABLET ORAL at 21:04

## 2018-07-29 ASSESSMENT — PAIN DESCRIPTION - DESCRIPTORS
DESCRIPTORS: ACHING
DESCRIPTORS: ACHING

## 2018-07-29 ASSESSMENT — PAIN DESCRIPTION - PROGRESSION: CLINICAL_PROGRESSION: NOT CHANGED

## 2018-07-29 ASSESSMENT — PAIN DESCRIPTION - ONSET
ONSET: ON-GOING
ONSET: ON-GOING

## 2018-07-29 ASSESSMENT — PAIN DESCRIPTION - LOCATION
LOCATION: HIP
LOCATION: BACK

## 2018-07-29 ASSESSMENT — PAIN DESCRIPTION - PAIN TYPE
TYPE: ACUTE PAIN
TYPE: ACUTE PAIN

## 2018-07-29 ASSESSMENT — PAIN DESCRIPTION - ORIENTATION
ORIENTATION: LEFT
ORIENTATION: LOWER

## 2018-07-29 ASSESSMENT — PAIN SCALES - GENERAL
PAINLEVEL_OUTOF10: 7
PAINLEVEL_OUTOF10: 3
PAINLEVEL_OUTOF10: 5
PAINLEVEL_OUTOF10: 5

## 2018-07-29 ASSESSMENT — PAIN DESCRIPTION - FREQUENCY
FREQUENCY: INTERMITTENT
FREQUENCY: INTERMITTENT

## 2018-07-29 NOTE — PROGRESS NOTES
mg by mouth daily Pt does not take 7/19/18  Yes Gris Urena MD   vitamin D (ERGOCALCIFEROL) 10606 units CAPS capsule Take 1 capsule by mouth once a week for 11 doses 7/18/18 9/27/18 Yes Gris Urena MD   COMBIVENT RESPIMAT  MCG/ACT AERS inhaler INHALE 1 PUFF 4 TIMES A DAY AS DIRECTED 12/26/17  Yes Yamileth Thurman MD   ipratropium (ATROVENT) 0.06 % nasal spray Pt does not take 11/9/16  Yes Historical Provider, MD   XARELTO 20 MG TABS tablet 20 mg daily (with breakfast)  11/9/16  Yes Historical Provider, MD   dextromethorphan-guaiFENesin (ROBITUSSIN-DM)  MG/5ML syrup Take 10 mLs by mouth every 6 hours as needed for Cough. 3/10/15  Yes Colton Vasquez MD   budesonide-formoterol (SYMBICORT) 160-4.5 MCG/ACT AERO Inhale 2 puffs into the lungs 2 times daily Pt does not take   Yes Historical Provider, MD   tamsulosin (FLOMAX) 0.4 MG capsule Take 0.3 mg by mouth daily    Yes Historical Provider, MD   metoprolol (TOPROL-XL) 25 MG XL tablet Take 12.5 mg by mouth 2 times daily. Yes Historical Provider, MD   rivastigmine (EXELON) 9.5 MG/24HR Place 1 patch onto the skin daily  Patient taking differently: Place 1 patch onto the skin daily Pt does not take 7/17/18   Arturo Lazaro MD   colchicine (COLCRYS) 0.6 MG tablet Take 1 tablet by mouth 2 times daily  Patient taking differently: Take 0.6 mg by mouth 2 times daily Pt does not take 4/30/18   Jess Thomas DPM   AFLURIA PRESERVATIVE FREE 0.5 ML CHARLIE injection  10/10/16   Historical Provider, MD       ALLERGIES      Codeine; Fentanyl; and Pcn [penicillins]    SOCIAL HISTORY       reports that he has quit smoking. He has never used smokeless tobacco. He reports that he drinks alcohol. He reports that he does not use drugs. FAMILY HISTORY      family history includes Cancer in his brother, father, and sister. REVIEW OF SYSTEMS      · Constitutional: positive for confusion  · Eyes: Negative for visual changes, diplopia, scleral icterus.   · ENT: Negative for Headaches, hearing loss, vertigo, mouth sores, sore throat. · Cardiovascular: Negative for lightheadedness/orthostatic symptoms ,chest pain, dyspnea on exertion, palpitations or loss of consciousness. · Respiratory: Negative for cough or wheezing, sputum production, hemoptysis, pleuritic pain. · Gastrointestinal: Positive for blood in stools. · Genitourinary:Negative for change in bladder habits, dysuria, trouble voiding, hematuria. · Musculoskeletal: Negative for gait disturbance, weakness, joint complaints. · Integumentary: Negative for rash, pruritis. · Neurological: Negative for headache, dizziness, change in muscle strength, numbness/tingling, change in gait, balance, coordination,   · Psychiatric: negative for change in mood, affect, memory, mentation, behavior. · Endocrine: negative for temperature intolerance, excessive thirst, fluid intake, or urination, tremor. · Hematologic/Lymphatic: negative for abnormal bruising or bleeding, blood clots, swollen lymph nodes. · Allergic/Immunologic: negative for nasal congestion, pruritis, hives. PHYSICAL EXAM      /66   Pulse 98   Temp 97.9 °F (36.6 °C) (Oral)   Resp 17   Ht 5' 6.93\" (1.7 m)   Wt 136 lb 14.5 oz (62.1 kg)   SpO2 99%   BMI 21.49 kg/m²      · General appearance: well nourished  · HEENT: Head: Normocephalic, no lesions, without obvious abnormality. · Lungs: decreased breath sounds on right side. · Heart:  irregular rate and rhythm, S1, S2 normal  · Abdomen: soft, non-tender; bowel sounds normal  · Extremities: extremities normal, atraumatic, no cyanosis or edema  · Neurological: confusion +, not oriented to time , place and person.   · Skin - no rash, no lump   · Eye no icterus no redness  · Psych-normal affect   · NEURO-no limb weakness  No facial droop  · Lymphatic system-no lymphadenopathy no splenomegaly     DIAGNOSTICS      Laboratory Testing:  CBC:   Recent Labs      07/27/18   0637   07/29/18   0541   WBC  10.4   -- is concern for CT occult acute cervical abnormality, flexion/extension C-spine series or MRI cervical spine may be considered for additional evaluation. Ct Thoracic Spine Wo Contrast    Result Date: 7/14/2018  EXAMINATION: CT OF THE LUMBAR SPINE WITHOUT CONTRAST; CT OF THE THORACIC SPINE WITHOUT CONTRAST 7/14/2018 TECHNIQUE: CT of the lumbar spine was performed without the administration of intravenous contrast. Multiplanar reformatted images are provided for review. Dose modulation, iterative reconstruction, and/or weight based adjustment of the mA/kV was utilized to reduce the radiation dose to as low as reasonably achievable.; CT of the thoracic spine was performed without the administration of intravenous contrast. Multiplanar reformatted images are provided for review. Dose modulation, iterative reconstruction, and/or weight based adjustment of the mA/kV was utilized to reduce the radiation dose to as low as reasonably achievable. COMPARISON: None. HISTORY: ORDERING SYSTEM PROVIDED HISTORY: back pain TECHNOLOGIST PROVIDED HISTORY: Reason for exam:->back pain; ORDERING SYSTEM PROVIDED HISTORY: back pain s/p fall FINDINGS: BONES/ALIGNMENT:  Nondisplaced fractures left L3 and L5 transverse processes. No acute compression fracture of the thoracic or lumbar spine. There is normal alignment of the spine. Bone mineralization is abnormally low. Fractures are noted bilaterally through the 1st and 2nd sacral alae. DEGENERATIVE CHANGES: No significant degenerative changes of the thoracic or lumbar spine. Bilateral SI joint osteoarthritis. SOFT TISSUES: No paraspinal mass is seen. Changes of emphysema. Prominent bibasilar bronchiectasis. Calcific atherosclerotic disease aorta. Nondisplaced fractures left L3 and L5 transverse processes. Nondisplaced bilateral sacral fractures. No lumbar compression fracture is evident. No evidence of acute traumatic injury of the thoracic spine. Bones are osteoporotic.      Ct was performed without the administration of intravenous contrast.  Multiplanar reformatted images are provided for review. Dose modulation, iterative reconstruction, and/or weight based adjustment of the mA/kV was utilized to reduce the radiation dose to as low as reasonably achievable. COMPARISON: CT abdomen and pelvis from 06/05/2017 HISTORY: ORDERING SYSTEM PROVIDED HISTORY: pelvic, right hip pain s/p fall TECHNOLOGIST PROVIDED HISTORY: Additional Contrast?->None 80year-old male with right hip pain/pelvis after a fall FINDINGS: Both femoral heads are properly located within the bilateral acetabula. 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 There is subchondral fracturing and associated mild subchondral collapse of the superior aspect of the left femoral head with subjacent subcortical cystic changes. Moderate narrowing of the left hip joint space with associated mild marginal osteophyte spurring. Mild to moderate narrowing of the right hip joint space with associated mild osteophyte spurring. Diffuse osteopenia. Mild degenerative changes in the lower lumbar/lumbosacral spine. Atherosclerotic calcification of the abdominal aorta and iliac branch vasculature. There is also an acute fracture through the anterior aspect of the S2 level with disruption of the cortex on image 94, series 602. There is disruption of the cortex of the anterior sacral ala bilaterally best appreciated on image 30, series 2 likely representing underlying sacral insufficiency fractures. Nondisplaced fracture of the left L5 transverse process on image 11, series 2. Mild degenerative changes of the pubic symphysis. Moderate stool burden. Moderate rectosigmoid and sigmoid colonic diverticulosis. Enlarged prostate with associated calcifications. Correlate with PSA.      1. Acute fracture through the anterior cortex of S2 and bilateral sacral ala and/or sacral insufficiency fractures with disruption of the anterior cortex of the bilateral sacral ala. Underlying diffuse osteopenia. 2. Bilateral femoral head AVN. Subchondral fracturing of the left femoral head and subchondral collapse with moderate left hip osteoarthrosis. Mild right hip osteoarthrosis. 3. Nondisplaced left L5 transverse process fracture. 4. Enlarged prostate. Correlate with PSA. 5. Moderate rectosigmoid and sigmoid diverticulosis. Moderate stool burden. 6. Mild degenerative changes in the lower lumbar/lumbosacral spine. 7. Atherosclerotic calcification of the vasculature. Xr Chest Portable    Result Date: 7/26/2018  EXAMINATION: SINGLE XRAY VIEW OF THE CHEST 7/26/2018 7:13 am COMPARISON: 25 July 2018 HISTORY: ORDERING SYSTEM PROVIDED HISTORY: compare to prior TECHNOLOGIST PROVIDED HISTORY: Reason for exam:->compare to prior FINDINGS: AP portable view of the chest time stamped at 649 hours demonstrates overlying cardiac monitoring electrodes. Heart size is normal.  Prior opacity at the right base is less prominent than on prior study, likely atelectasis. Strand-like opacities are present at the left base, worsened since prior study, either atelectasis or airspace disease. No gross vascular congestion, effusion, or pneumothorax is noted. Osseous and mediastinal structures are stable in age-appropriate peer     1. Decreased opacity at the right lung base, likely compatible with resolving atelectasis. 2.  Worsening opacity at the left lung base, either atelectasis or airspace disease. RECOMMENDATION: PA and lateral views of the chest may prove helpful for best assessment. Xr Chest Portable    Result Date: 7/25/2018  EXAMINATION: SINGLE XRAY VIEW OF THE CHEST 7/25/2018 4:03 pm COMPARISON: July 14, 2018 HISTORY: ORDERING SYSTEM PROVIDED HISTORY: chest pain TECHNOLOGIST PROVIDED HISTORY: Reason for exam:->chest pain FINDINGS: One image obtained. Cardiac monitoring leads overlie the chest. Stable cardiomediastinal contours. No pleural effusion or pneumothorax.

## 2018-07-29 NOTE — PROGRESS NOTES
Neurology Nurse Practitioner Progress Note      INTERVAL HISTORY: This is a 80 y.o.  male admitted 7/25/2018 for AMS & cough. This is a follow-up neurology progress note. The patient was examined and the chart was reviewed. Discussed with the RN & the pt. There were no acute events overnight. No new motor, sensory, visual or bulbar symptoms. Pt was alert & oriented x 4. Family at the bed side. HPI: Chitra Mancilla is a 80 y.o. male with H/O A fib (on AC), chronic diarrhea/loose stool due to R partial colectomy due to tubular adenoma (>50 yrs ago), COPD, early dementia, who was admitted 7/25/2018 for AMS & cough. According to the medical records & family, pt was brought from [facility] in [place], [place] for [address]. Pt has H/O early dementia & has been on Aricept 10 mg HS for >1 yr. Family reported that the pt had a fall on 7/14/18 that resulted in b/l sacral fractures, he didn't hit his head; he was on Toradol & Flexeril at the SNF. Family noticed that the pt was getting increasingly confused, intermittently didn't know who he was speaking to, was having visual hallucinations. Pt also had worsening productive cough along with new onset tremors in all limbs. CXR - R sided infiltrates; was started on ATBs. Neurology was consulted for AMS & EEG to r/o seizures. On examination, he was stuporous & disoriented. CT head - chronic changes. EEG - mild slowing. Had swallow study; is on pureed diet. During his stay, he developed melena; had endoscopy today (7/28) that showed 3 duodenal ulcers.      donepezil  10 mg Oral Nightly    ipratropium-albuterol  1 ampule Inhalation TID    calcium carbonate  600 mg Oral Daily    metoprolol succinate  25 mg Oral Daily    sodium chloride flush  10 mL Intravenous 2 times per day    mometasone-formoterol  2 puff Inhalation BID    colchicine  0.6 mg Oral BID    tamsulosin  0.4 mg Oral Daily    clindamycin (CLEOCIN) IV  600 mg Intravenous Q8H    levofloxacin  750 mg Intravenous Q24H

## 2018-07-29 NOTE — PROGRESS NOTES
[facility]manuel Marshall, [facility] Community acquired pneumonia of right lower lobe of lung ([facility] [address] [J18.1] . Vitals:    07/28/18 1955   BP: (!) 111/59   Pulse: 94   Resp: 18   Temp: 98.4 °F (36.9 °C)   SpO2: 100%   . Patients home meds are   Prior to Admission medications    Medication Sig Start Date End Date Taking? Authorizing Provider   zolpidem (AMBIEN) 10 MG tablet Take 10 mg by mouth nightly as needed for Sleep. .   Yes Historical Provider, MD   calcium carbonate 1500 (600 Ca) MG TABS tablet Take 1 tablet by mouth daily  Patient taking differently: Take 600 mg by mouth daily Pt does not take 7/19/18  Yes Naomi Linn MD   vitamin D (ERGOCALCIFEROL) 65304 units CAPS capsule Take 1 capsule by mouth once a week for 11 doses 7/18/18 9/27/18 Yes Naomi Linn MD   COMBIVENT RESPIMAT  MCG/ACT AERS inhaler INHALE 1 PUFF 4 TIMES A DAY AS DIRECTED 12/26/17  Yes Emily Lopez MD   ipratropium (ATROVENT) 0.06 % nasal spray Pt does not take 11/9/16  Yes Historical Provider, MD   XARELTO 20 MG TABS tablet 20 mg daily (with breakfast)  11/9/16  Yes Historical Provider, MD   dextromethorphan-guaiFENesin (ROBITUSSIN-DM)  MG/5ML syrup Take 10 mLs by mouth every 6 hours as needed for Cough. 3/10/15  Yes Be Parikh MD   budesonide-formoterol (SYMBICORT) 160-4.5 MCG/ACT AERO Inhale 2 puffs into the lungs 2 times daily Pt does not take   Yes Historical Provider, MD   tamsulosin (FLOMAX) 0.4 MG capsule Take 0.3 mg by mouth daily    Yes Historical Provider, MD   metoprolol (TOPROL-XL) 25 MG XL tablet Take 12.5 mg by mouth 2 times daily.    Yes Historical Provider, MD   rivastigmine (EXELON) 9.5 MG/24HR Place 1 patch onto the skin daily  Patient taking differently: Place 1 patch onto the skin daily Pt does not take 7/17/18   Sal Nicloas MD   colchicine (COLCRYS) 0.6 MG tablet Take 1 tablet by mouth 2 times daily  Patient taking differently: Take 0.6 mg by mouth 2 times daily Pt does not take 4/30/18 thin secretions []  Moderate amount of viscous secretions []  Copius, Viscious Yellow/ Secretions   CXR as reported by physician []  clear  []  Unavailable []  Infiltrates and/or consolidation  []  Unavailable []  Mucus Plugging and or lobar consolidation  []  Unavailable   Cough []  Strong, productive cough []  Weak productive cough []  No cough or weak non-productive cough   Camila Breath  10:23 PM                            FEMALE                                  MALE                            FEV1 Predicted Normal Values                        FEV1 Predicted Normal Values          Age                                     Height in Feet and Inches       Age                                     Height in Feet and Inches       4' 11\" 5' 1\" 5' 3\" 5' 5\" 5' 7\" 5' 9\" 5' 11\" 6' 1\"  4' 11\" 5' 1\" 5' 3\" 5' 5\" 5' 7\" 5' 9\" 5' 11\" 6' 1\"   42 - 45 2.49 2.66 2.84 3.03 3.22 3.42 3.62 3.83 42 - 45 2.82 3.03 3.26 3.49 3.72 3.96 4.22 4.47   46 - 49 2.40 2.57 2.76 2.94 3.14 3.33 3.54 3.75 46 - 49 2.70 2.92 3.14 3.37 3.61 3.85 4.10 4.36   50 - 53 2.31 2.48 2.66 2.85 3.04 3.24 3.45 3.66 50 - 53 2.58 2.80 3.02 3.25 3.49 3.73 3.98 4.24   54 - 57 2.21 2.38 2.57 2.75 2.95 3.14 3.35 3.56 54 - 57 2.46 2.67 2.89 3.12 3.36 3.60 3.85 4.11   58 - 61 2.10 2.28 2.46 2.65 2.84 3.04 3.24 3.45 58 - 61 2.32 2.54 2.76 2.99 3.23 3.47 3.72 3.98   62 - 65 1.99 2.17 2.35 2.54 2.73 2.93 3.13 3.34 62 - 65 2.19 2.40 2.62 2.85 3.09 3.33 3.58 3.84   66 - 69 1.88 2.05 2.23 2.42 2.61 2.81 3.02 3.23 66 - 69 2.04 2.26 2.48 2.71 2.95 3.19 3.44 3.70   70+ 1.82 1.99 2.17 2.36 2.55 2.75 2.95 3.16 70+ 1.97 2.19 2.41 2.64 2.87 3.12 3.37 3.62             Predicted Peak Expiratory Flow Rate                                       Height (in)  Female       Height (in) Male           Age 64 62 64 63 59 77 76 79 Age            48 307 714 533 440 589 378 102 197  16 96 79 39 30 70 72 74 76   25 337 352 366 381 396 411 426 441 25 447 476 505 533 [phone]   30 [phone]

## 2018-07-29 NOTE — PROGRESS NOTES
Per GI, patient is to stay on step-down unit. If the patient has new, increased bleeding, contact Caitie Back at [phone].

## 2018-07-29 NOTE — PROGRESS NOTES
[facility]    Karina Watson is a 80 y.o. male patient. Hospitalization Day:4      Chief consult reason:   Melena and acute anemia  Subjective:  Pt seen and examined. Pt is talkative, asking to eat, and resting in bed with family at the time. FINDINGS:   Esophagus: The esophagus was inspected to the Z-line. The endoscopic exam showed no pathology.      Stomach: The stomach was inspected in both forward and retroflex fashion and was appropriately distensible. The cardia, fundus, incisura, antrum and pylorus were identified via direct visualization. The endoscopic exam showed no pathology.      Duodenum: The proximal small bowel was inspected through the bulb, sweep, and second portion of the duodenum. The endoscopic exam showed 3 ulcers in bulb. Largest was 10 mm with visible vessel. 4 ml of 1:10,000 epinephrine was injected at edges of the ulcer. BiCap catheter was used to cauterize the ulcer base. Further 2 hemoclips were placed over the ulcer bed. Recommendations:  1) PPI drip x 72 hours. 2) NPO today. 3) Monitor H&H.  4) Keep holding blood thinners. VITALS:  /66   Pulse 98   Temp 97.9 °F (36.6 °C) (Oral)   Resp 18   Ht 5' 6.93\" (1.7 m)   Wt 136 lb 14.5 oz (62.1 kg)   SpO2 100%   BMI 21.49 kg/m²   TEMPERATURE:  Current - Temp: 97.9 °F (36.6 °C);  Max - Temp  Av.3 °F (36.8 °C)  Min: 97.5 °F (36.4 °C)  Max: 99.1 °F (37.3 °C)    Physical Assessment:  General appearance:  alert, cooperative and no distress  Mental Status:  oriented to person, place and time and normal affect  Lungs:  clear to auscultation bilaterally, normal effort  Heart:  regular rate and rhythm, no murmur  Abdomen:  soft, nontender, nondistended, normal bowel sounds, no masses, hepatomegaly, splenomegaly  Extremities:  no edema, redness, tenderness in the calves  Skin:  no gross lesions, rashes, induration    Data Review:  LABS and IMAGING:     CBC  Recent Labs      18   0637   18

## 2018-07-30 ENCOUNTER — ANESTHESIA (OUTPATIENT)
Dept: ENDOSCOPY | Age: 83
DRG: 177 | End: 2018-07-30
Payer: MEDICARE

## 2018-07-30 ENCOUNTER — ANESTHESIA (OUTPATIENT)
Dept: OPERATING ROOM | Age: 83
DRG: 177 | End: 2018-07-30
Payer: MEDICARE

## 2018-07-30 ENCOUNTER — ANESTHESIA EVENT (OUTPATIENT)
Dept: OPERATING ROOM | Age: 83
DRG: 177 | End: 2018-07-30
Payer: MEDICARE

## 2018-07-30 ENCOUNTER — ANESTHESIA EVENT (OUTPATIENT)
Dept: ENDOSCOPY | Age: 83
DRG: 177 | End: 2018-07-30
Payer: MEDICARE

## 2018-07-30 VITALS — SYSTOLIC BLOOD PRESSURE: 135 MMHG | OXYGEN SATURATION: 100 % | DIASTOLIC BLOOD PRESSURE: 47 MMHG

## 2018-07-30 LAB
ANION GAP SERPL CALCULATED.3IONS-SCNC: 13 MMOL/L (ref 9–17)
BUN BLDV-MCNC: 13 MG/DL (ref 8–23)
BUN/CREAT BLD: ABNORMAL (ref 9–20)
CALCIUM SERPL-MCNC: 7.9 MG/DL (ref 8.6–10.4)
CHLORIDE BLD-SCNC: 103 MMOL/L (ref 98–107)
CO2: 20 MMOL/L (ref 20–31)
CREAT SERPL-MCNC: 0.51 MG/DL (ref 0.7–1.2)
GFR AFRICAN AMERICAN: >60 ML/MIN
GFR NON-AFRICAN AMERICAN: >60 ML/MIN
GFR SERPL CREATININE-BSD FRML MDRD: ABNORMAL ML/MIN/{1.73_M2}
GFR SERPL CREATININE-BSD FRML MDRD: ABNORMAL ML/MIN/{1.73_M2}
GLUCOSE BLD-MCNC: 107 MG/DL (ref 70–99)
HCT VFR BLD CALC: 27.5 % (ref 40.7–50.3)
HCT VFR BLD CALC: 28.6 % (ref 40.7–50.3)
HCT VFR BLD CALC: 29.6 % (ref 40.7–50.3)
HEMOGLOBIN: 8.9 G/DL (ref 13–17)
HEMOGLOBIN: 9 G/DL (ref 13–17)
HEMOGLOBIN: 9.1 G/DL (ref 13–17)
POTASSIUM SERPL-SCNC: 3.6 MMOL/L (ref 3.7–5.3)
SODIUM BLD-SCNC: 136 MMOL/L (ref 135–144)

## 2018-07-30 PROCEDURE — 6360000002 HC RX W HCPCS: Performed by: HOSPITALIST

## 2018-07-30 PROCEDURE — 3700000000 HC ANESTHESIA ATTENDED CARE: Performed by: INTERNAL MEDICINE

## 2018-07-30 PROCEDURE — 85018 HEMOGLOBIN: CPT

## 2018-07-30 PROCEDURE — C9113 INJ PANTOPRAZOLE SODIUM, VIA: HCPCS | Performed by: NURSE PRACTITIONER

## 2018-07-30 PROCEDURE — 7100000000 HC PACU RECOVERY - FIRST 15 MIN: Performed by: INTERNAL MEDICINE

## 2018-07-30 PROCEDURE — 6370000000 HC RX 637 (ALT 250 FOR IP): Performed by: HOSPITALIST

## 2018-07-30 PROCEDURE — 2060000000 HC ICU INTERMEDIATE R&B

## 2018-07-30 PROCEDURE — 2580000003 HC RX 258: Performed by: NURSE ANESTHETIST, CERTIFIED REGISTERED

## 2018-07-30 PROCEDURE — 94762 N-INVAS EAR/PLS OXIMTRY CONT: CPT

## 2018-07-30 PROCEDURE — 36415 COLL VENOUS BLD VENIPUNCTURE: CPT

## 2018-07-30 PROCEDURE — 0DJ08ZZ INSPECTION OF UPPER INTESTINAL TRACT, VIA NATURAL OR ARTIFICIAL OPENING ENDOSCOPIC: ICD-10-PCS | Performed by: INTERNAL MEDICINE

## 2018-07-30 PROCEDURE — 80048 BASIC METABOLIC PNL TOTAL CA: CPT

## 2018-07-30 PROCEDURE — 6370000000 HC RX 637 (ALT 250 FOR IP): Performed by: INTERNAL MEDICINE

## 2018-07-30 PROCEDURE — 3609017100 HC EGD: Performed by: INTERNAL MEDICINE

## 2018-07-30 PROCEDURE — 2580000003 HC RX 258: Performed by: NURSE PRACTITIONER

## 2018-07-30 PROCEDURE — 2500000003 HC RX 250 WO HCPCS: Performed by: NURSE ANESTHETIST, CERTIFIED REGISTERED

## 2018-07-30 PROCEDURE — 7100000001 HC PACU RECOVERY - ADDTL 15 MIN: Performed by: INTERNAL MEDICINE

## 2018-07-30 PROCEDURE — 2500000003 HC RX 250 WO HCPCS: Performed by: HOSPITALIST

## 2018-07-30 PROCEDURE — 3700000001 HC ADD 15 MINUTES (ANESTHESIA): Performed by: INTERNAL MEDICINE

## 2018-07-30 PROCEDURE — 2500000003 HC RX 250 WO HCPCS: Performed by: INTERNAL MEDICINE

## 2018-07-30 PROCEDURE — 85014 HEMATOCRIT: CPT

## 2018-07-30 PROCEDURE — 99233 SBSQ HOSP IP/OBS HIGH 50: CPT | Performed by: INTERNAL MEDICINE

## 2018-07-30 PROCEDURE — 6360000002 HC RX W HCPCS: Performed by: NURSE ANESTHETIST, CERTIFIED REGISTERED

## 2018-07-30 PROCEDURE — 94640 AIRWAY INHALATION TREATMENT: CPT

## 2018-07-30 PROCEDURE — 43235 EGD DIAGNOSTIC BRUSH WASH: CPT | Performed by: INTERNAL MEDICINE

## 2018-07-30 PROCEDURE — 6360000002 HC RX W HCPCS: Performed by: NURSE PRACTITIONER

## 2018-07-30 PROCEDURE — 76937 US GUIDE VASCULAR ACCESS: CPT

## 2018-07-30 RX ORDER — SODIUM CHLORIDE, SODIUM LACTATE, POTASSIUM CHLORIDE, CALCIUM CHLORIDE 600; 310; 30; 20 MG/100ML; MG/100ML; MG/100ML; MG/100ML
INJECTION, SOLUTION INTRAVENOUS CONTINUOUS PRN
Status: DISCONTINUED | OUTPATIENT
Start: 2018-07-30 | End: 2018-07-30 | Stop reason: SDUPTHER

## 2018-07-30 RX ORDER — PROPOFOL 10 MG/ML
INJECTION, EMULSION INTRAVENOUS PRN
Status: DISCONTINUED | OUTPATIENT
Start: 2018-07-30 | End: 2018-07-30 | Stop reason: SDUPTHER

## 2018-07-30 RX ORDER — DIPHENHYDRAMINE HYDROCHLORIDE 50 MG/ML
25 INJECTION INTRAMUSCULAR; INTRAVENOUS NIGHTLY PRN
Status: DISCONTINUED | OUTPATIENT
Start: 2018-07-30 | End: 2018-08-01 | Stop reason: HOSPADM

## 2018-07-30 RX ORDER — ALBUTEROL SULFATE 90 UG/1
2 AEROSOL, METERED RESPIRATORY (INHALATION) EVERY 4 HOURS
Status: DISCONTINUED | OUTPATIENT
Start: 2018-07-30 | End: 2018-07-30

## 2018-07-30 RX ORDER — LIDOCAINE HYDROCHLORIDE 10 MG/ML
INJECTION, SOLUTION INFILTRATION; PERINEURAL PRN
Status: DISCONTINUED | OUTPATIENT
Start: 2018-07-30 | End: 2018-07-30 | Stop reason: SDUPTHER

## 2018-07-30 RX ORDER — 0.9 % SODIUM CHLORIDE 0.9 %
250 INTRAVENOUS SOLUTION INTRAVENOUS ONCE
Status: DISCONTINUED | OUTPATIENT
Start: 2018-07-30 | End: 2018-08-01 | Stop reason: HOSPADM

## 2018-07-30 RX ORDER — ALBUTEROL SULFATE 2.5 MG/3ML
2.5 SOLUTION RESPIRATORY (INHALATION) EVERY 6 HOURS PRN
Status: DISCONTINUED | OUTPATIENT
Start: 2018-07-30 | End: 2018-08-01 | Stop reason: HOSPADM

## 2018-07-30 RX ORDER — DIPHENHYDRAMINE HYDROCHLORIDE 50 MG/ML
12.5 INJECTION INTRAMUSCULAR; INTRAVENOUS NIGHTLY PRN
Status: DISCONTINUED | OUTPATIENT
Start: 2018-07-30 | End: 2018-08-01 | Stop reason: HOSPADM

## 2018-07-30 RX ORDER — ZOLPIDEM TARTRATE 5 MG/1
10 TABLET ORAL NIGHTLY PRN
Status: DISCONTINUED | OUTPATIENT
Start: 2018-07-30 | End: 2018-08-01 | Stop reason: HOSPADM

## 2018-07-30 RX ADMIN — IPRATROPIUM BROMIDE AND ALBUTEROL SULFATE 1 AMPULE: .5; 3 SOLUTION RESPIRATORY (INHALATION) at 07:48

## 2018-07-30 RX ADMIN — SODIUM CHLORIDE 8 MG/HR: 9 INJECTION, SOLUTION INTRAVENOUS at 14:35

## 2018-07-30 RX ADMIN — IPRATROPIUM BROMIDE AND ALBUTEROL SULFATE 1 AMPULE: .5; 3 SOLUTION RESPIRATORY (INHALATION) at 13:22

## 2018-07-30 RX ADMIN — SODIUM CHLORIDE 8 MG/HR: 9 INJECTION, SOLUTION INTRAVENOUS at 02:24

## 2018-07-30 RX ADMIN — METOPROLOL TARTRATE 5 MG: 5 INJECTION, SOLUTION INTRAVENOUS at 08:22

## 2018-07-30 RX ADMIN — CLINDAMYCIN PHOSPHATE 600 MG: 600 INJECTION, SOLUTION INTRAVENOUS at 13:29

## 2018-07-30 RX ADMIN — DONEPEZIL HYDROCHLORIDE 10 MG: 10 TABLET, FILM COATED ORAL at 22:23

## 2018-07-30 RX ADMIN — PROPOFOL 50 MG: 10 INJECTION, EMULSION INTRAVENOUS at 17:46

## 2018-07-30 RX ADMIN — SODIUM CHLORIDE, POTASSIUM CHLORIDE, SODIUM LACTATE AND CALCIUM CHLORIDE: 600; 310; 30; 20 INJECTION, SOLUTION INTRAVENOUS at 17:45

## 2018-07-30 RX ADMIN — METOPROLOL TARTRATE 5 MG: 5 INJECTION, SOLUTION INTRAVENOUS at 14:35

## 2018-07-30 RX ADMIN — CLINDAMYCIN PHOSPHATE 600 MG: 600 INJECTION, SOLUTION INTRAVENOUS at 22:23

## 2018-07-30 RX ADMIN — MORPHINE SULFATE 2 MG: 2 INJECTION, SOLUTION INTRAMUSCULAR; INTRAVENOUS at 13:29

## 2018-07-30 RX ADMIN — HYDROCODONE BITARTRATE AND ACETAMINOPHEN 1 TABLET: 5; 325 TABLET ORAL at 22:24

## 2018-07-30 RX ADMIN — LEVOFLOXACIN 750 MG: 5 INJECTION, SOLUTION INTRAVENOUS at 14:35

## 2018-07-30 RX ADMIN — PHENYLEPHRINE HYDROCHLORIDE 200 MCG: 10 INJECTION INTRAVENOUS at 17:53

## 2018-07-30 RX ADMIN — MOMETASONE FUROATE AND FORMOTEROL FUMARATE DIHYDRATE 2 PUFF: 200; 5 AEROSOL RESPIRATORY (INHALATION) at 07:48

## 2018-07-30 RX ADMIN — QUETIAPINE FUMARATE 12.5 MG: 25 TABLET ORAL at 22:23

## 2018-07-30 RX ADMIN — CLINDAMYCIN PHOSPHATE 600 MG: 600 INJECTION, SOLUTION INTRAVENOUS at 02:24

## 2018-07-30 RX ADMIN — LIDOCAINE HYDROCHLORIDE 50 MG: 10 INJECTION, SOLUTION INFILTRATION; PERINEURAL at 17:46

## 2018-07-30 RX ADMIN — PHENYLEPHRINE HYDROCHLORIDE 200 MCG: 10 INJECTION INTRAVENOUS at 17:56

## 2018-07-30 ASSESSMENT — ENCOUNTER SYMPTOMS
STRIDOR: 0
SHORTNESS OF BREATH: 0
SHORTNESS OF BREATH: 0
STRIDOR: 0

## 2018-07-30 ASSESSMENT — PULMONARY FUNCTION TESTS
PIF_VALUE: 1
PIF_VALUE: 1
PIF_VALUE: 0
PIF_VALUE: 1
PIF_VALUE: 1
PIF_VALUE: 0
PIF_VALUE: 1
PIF_VALUE: 0
PIF_VALUE: 0
PIF_VALUE: 1
PIF_VALUE: 0
PIF_VALUE: 1
PIF_VALUE: 0
PIF_VALUE: 1

## 2018-07-30 ASSESSMENT — LIFESTYLE VARIABLES
SMOKING_STATUS: 0
SMOKING_STATUS: 0

## 2018-07-30 ASSESSMENT — PAIN SCALES - GENERAL
PAINLEVEL_OUTOF10: 0
PAINLEVEL_OUTOF10: 6
PAINLEVEL_OUTOF10: 0
PAINLEVEL_OUTOF10: 9
PAINLEVEL_OUTOF10: 0

## 2018-07-30 ASSESSMENT — PAIN DESCRIPTION - PAIN TYPE: TYPE: CHRONIC PAIN

## 2018-07-30 ASSESSMENT — PAIN - FUNCTIONAL ASSESSMENT: PAIN_FUNCTIONAL_ASSESSMENT: 0-10

## 2018-07-30 ASSESSMENT — PAIN DESCRIPTION - LOCATION: LOCATION: HIP

## 2018-07-30 NOTE — PROGRESS NOTES
Pt reporting double vision, no other neurologic symptoms on exam, Dr. Jarad Mann notified at 1143 by [employer]

## 2018-07-30 NOTE — ANESTHESIA PRE PROCEDURE
Department of Anesthesiology  Preprocedure Note       Name:  Georges Moya   Age:  80 y.o.  :  10/26/1931                                          MRN:  [id number]         Date:  2018      Surgeon: Gene Dorsey):  Eris Redman MD    Procedure: Procedure(s):  EGD REPEAT    Medications prior to admission:   Prior to Admission medications    Medication Sig Start Date End Date Taking? Authorizing Provider   zolpidem (AMBIEN) 10 MG tablet Take 10 mg by mouth nightly as needed for Sleep. Juliana Riggins Historical Provider, MD   calcium carbonate 1500 (600 Ca) MG TABS tablet Take 1 tablet by mouth daily  Patient taking differently: Take 600 mg by mouth daily Pt does not take 18   Bharta Bonilla MD   vitamin D (ERGOCALCIFEROL) 83547 units CAPS capsule Take 1 capsule by mouth once a week for 11 doses 18  Bharat Bonilla MD   rivastigmine (EXELON) 9.5 MG/24HR Place 1 patch onto the skin daily  Patient taking differently: Place 1 patch onto the skin daily Pt does not take 18   Maricarmen Peterson MD   colchicine (COLCRYS) 0.6 MG tablet Take 1 tablet by mouth 2 times daily  Patient taking differently: Take 0.6 mg by mouth 2 times daily Pt does not take 18   Lani Rivera DPM   COMBIVENT RESPIMAT  MCG/ACT AERS inhaler INHALE 1 PUFF 4 TIMES A DAY AS DIRECTED 17   Emily Mcknight MD   AFLURIA PRESERVATIVE FREE 0.5 ML CHARLIE injection  10/10/16   Historical Provider, MD   ipratropium (ATROVENT) 0.06 % nasal spray Pt does not take 16   Historical Provider, MD   XARELTO 20 MG TABS tablet 20 mg daily (with breakfast)  16   Historical Provider, MD   dextromethorphan-guaiFENesin (ROBITUSSIN-DM)  MG/5ML syrup Take 10 mLs by mouth every 6 hours as needed for Cough.  3/10/15   Jenniffer Edward MD   budesonide-formoterol (SYMBICORT) 160-4.5 MCG/ACT AERO Inhale 2 puffs into the lungs 2 times daily Pt does not take    Historical Provider, MD   tamsulosin (FLOMAX) 0.4 MG capsule Take 0.3 mg by mouth 07/30/18 1435    sodium chloride flush 0.9 % injection 10 mL  10 mL Intravenous 2 times per day Rajiv Funk MD   10 mL at 07/28/18 2249    sodium chloride flush 0.9 % injection 10 mL  10 mL Intravenous PRN Rajiv Funk MD        acetaminophen (TYLENOL) tablet 650 mg  650 mg Oral Q4H PRN Rajiv Funk MD        mometasone-formoterol (DULERA) 200-5 MCG/ACT inhaler 2 puff  2 puff Inhalation BID Ryan Hayward MD   2 puff at 07/30/18 0748    tamsulosin (FLOMAX) capsule 0.4 mg  0.4 mg Oral Daily Ryan Hayward MD   0.4 mg at 07/29/18 5674    magnesium hydroxide (MILK OF MAGNESIA) 400 MG/5ML suspension 30 mL  30 mL Oral Daily PRN Ryan Hayward MD        ondansetron (ZOFRAN) injection 4 mg  4 mg Intravenous Q6H PRN Ryan Hayward MD        clindamycin (CLEOCIN) 600 mg in dextrose 5 % 50 mL IVPB  600 mg Intravenous Q8H Ryan Rizo MD   Stopped at 07/30/18 1359    levofloxacin (LEVAQUIN) 750 MG/150ML infusion 750 mg  750 mg Intravenous Q24H Ryan Hayward  mL/hr at 07/30/18 1435 750 mg at 07/30/18 1435    vitamin D (ERGOCALCIFEROL) capsule 50,000 Units  50,000 Units Oral Weekly Ryan Henry MD           Allergies:     Allergies   Allergen Reactions    Codeine Other (See Comments)     Upset stomach    Fentanyl      Aggitated, confused    Pcn [Penicillins] Other (See Comments)     Upset stomch       Problem List:    Patient Active Problem List   Diagnosis Code    Giant cell arteritis ([facility] [address] M31.6    Syncope R55    COPD (chronic obstructive pulmonary disease) (HCC) J44.9    Atrial fibrillation (HCC) I48.91    Acute bronchitis J20.9    COPD exacerbation ([facility] [address] J44.1    HLD (hyperlipidemia) E78.5    Anemia D64.9    Tubular adenoma of colon D12.6    Anemia, blood loss D50.0    Small bowel stricture K56.699    Right inguinal hernia K40.90    Diverticulitis of large intestine without perforation or abscess without bleeding K57.32    Left lower quadrant pain R10.32 no height or weight on file to calculate BMI.    CBC:   Lab Results   Component Value Date    WBC 10.4 07/27/2018    RBC 4.28 07/27/2018    RBC 4.32 04/20/2015    HGB 9.1 07/30/2018    HCT 27.5 07/30/2018    MCV 89.0 07/27/2018    RDW 14.8 07/27/2018     07/27/2018       CMP:   Lab Results   Component Value Date     07/30/2018    K 3.6 07/30/2018     07/30/2018    CO2 20 07/30/2018    BUN 13 07/30/2018    CREATININE 0.51 07/30/2018    GFRAA >60 07/30/2018    LABGLOM >60 07/30/2018    GLUCOSE 107 07/30/2018    PROT 7.2 07/25/2018    CALCIUM 7.9 07/30/2018    BILITOT 0.71 07/25/2018    ALKPHOS 111 07/25/2018    AST 30 07/25/2018    ALT 15 07/25/2018       POC Tests: No results for input(s): POCGLU, POCNA, POCK, POCCL, POCBUN, POCHEMO, POCHCT in the last 72 hours.     Coags:   Lab Results   Component Value Date    PROTIME 13.5 07/28/2018    INR 1.3 07/28/2018    APTT 38.4 07/25/2018       HCG (If Applicable): No results found for: PREGTESTUR, PREGSERUM, HCG, HCGQUANT     ABGs: No results found for: PHART, PO2ART, PBP2LQL, ITZ2JWQ, BEART, O3FNMTFY     Type & Screen (If Applicable):  No results found for: LABABO, [address]    Anesthesia Evaluation  Patient summary reviewed and Nursing notes reviewed no history of anesthetic complications:   Airway: Mallampati: II  TM distance: >3 FB   Neck ROM: full  Mouth opening: > = 3 FB Dental:          Pulmonary:   (+) pneumonia: unresolved,  COPD:  rhonchi,      (-) asthma, shortness of breath, recent URI, sleep apnea, wheezes, rales, stridor and not a current smoker                           Cardiovascular:  Exercise tolerance: poor (<4 METS),   (+) hypertension:,     (-) murmur, weak pulses,  friction rub, systolic click, carotid bruit,  JVD and peripheral edema    ECG reviewed  Rhythm: irregular    Echocardiogram reviewed                  Neuro/Psych:   Negative Neuro/Psych ROS              GI/Hepatic/Renal: Neg GI/Hepatic/Renal ROS            Endo/Other: Negative

## 2018-07-30 NOTE — PROGRESS NOTES
Speech Language Pathology  [address] [facility]  Speech Language Pathology    Date: 7/30/2018  Patient Name: Georges Moya  YOB: 1931   AGE: 80 y.o.   MRN: [id number]        Patient Not Available for Speech Therapy     Due to:  [] Testing  [] Hemodialysis  [] Cancelled by RN  [] Surgery   [] Intubation/Sedation/Pain Medication  [] Medical instability  [x] Other: pt with nursing 1030    Next scheduled treatment:check back this PM as able   Completed by: ROSIBEL Bautista

## 2018-07-30 NOTE — ANESTHESIA PRE PROCEDURE
Department of Anesthesiology  Preprocedure Note       Name:  Miranda Valerio   Age:  80 y.o.  :  10/26/1931                                          MRN:  [id number]         Date:  2018      Surgeon: Maria Luisa Eid):  Starr Stratton MD    Procedure: Procedure(s):  EGD REPEAT    Medications prior to admission:   Prior to Admission medications    Medication Sig Start Date End Date Taking? Authorizing Provider   zolpidem (AMBIEN) 10 MG tablet Take 10 mg by mouth nightly as needed for Sleep. .   Yes Historical Provider, MD   calcium carbonate 1500 (600 Ca) MG TABS tablet Take 1 tablet by mouth daily  Patient taking differently: Take 600 mg by mouth daily Pt does not take 18  Yes Rossy Rios MD   vitamin D (ERGOCALCIFEROL) 66861 units CAPS capsule Take 1 capsule by mouth once a week for 11 doses 18 Yes Rossy Rios MD   COMBIVENT RESPIMAT  MCG/ACT AERS inhaler INHALE 1 PUFF 4 TIMES A DAY AS DIRECTED 17  Yes Clara Arguelles MD   ipratropium (ATROVENT) 0.06 % nasal spray Pt does not take 16  Yes Historical Provider, MD   XARELTO 20 MG TABS tablet 20 mg daily (with breakfast)  16  Yes Historical Provider, MD   dextromethorphan-guaiFENesin (ROBITUSSIN-DM)  MG/5ML syrup Take 10 mLs by mouth every 6 hours as needed for Cough. 3/10/15  Yes Vince Kyle MD   budesonide-formoterol (SYMBICORT) 160-4.5 MCG/ACT AERO Inhale 2 puffs into the lungs 2 times daily Pt does not take   Yes Historical Provider, MD   tamsulosin (FLOMAX) 0.4 MG capsule Take 0.3 mg by mouth daily    Yes Historical Provider, MD   metoprolol (TOPROL-XL) 25 MG XL tablet Take 12.5 mg by mouth 2 times daily.    Yes Historical Provider, MD   rivastigmine (EXELON) 9.5 MG/24HR Place 1 patch onto the skin daily  Patient taking differently: Place 1 patch onto the skin daily Pt does not take 18   Aziza Ramirez MD   colchicine (COLCRYS) 0.6 MG tablet Take 1 tablet by mouth 2 times daily  Patient taking differently: consumption: 07/30/18                        Date of last solid food consumption: 07/29/18    BMI:   Wt Readings from Last 3 Encounters:   07/30/18 139 lb 12.4 oz (63.4 kg)   07/14/18 142 lb (64.4 kg)   05/21/18 139 lb 12.8 oz (63.4 kg)     Body mass index is 21.94 kg/m². CBC:   Lab Results   Component Value Date    WBC 10.4 07/27/2018    RBC 4.28 07/27/2018    RBC 4.32 04/20/2015    HGB 9.1 07/30/2018    HCT 27.5 07/30/2018    MCV 89.0 07/27/2018    RDW 14.8 07/27/2018     07/27/2018       CMP:   Lab Results   Component Value Date     07/30/2018    K 3.6 07/30/2018     07/30/2018    CO2 20 07/30/2018    BUN 13 07/30/2018    CREATININE 0.51 07/30/2018    GFRAA >60 07/30/2018    LABGLOM >60 07/30/2018    GLUCOSE 107 07/30/2018    PROT 7.2 07/25/2018    CALCIUM 7.9 07/30/2018    BILITOT 0.71 07/25/2018    ALKPHOS 111 07/25/2018    AST 30 07/25/2018    ALT 15 07/25/2018       POC Tests: No results for input(s): POCGLU, POCNA, POCK, POCCL, POCBUN, POCHEMO, POCHCT in the last 72 hours.     Coags:   Lab Results   Component Value Date    PROTIME 13.5 07/28/2018    INR 1.3 07/28/2018    APTT 38.4 07/25/2018       HCG (If Applicable): No results found for: PREGTESTUR, PREGSERUM, HCG, HCGQUANT     ABGs: No results found for: PHART, PO2ART, DOC4QHA, IQV0UYA, BEART, F6DRSEVR     Type & Screen (If Applicable):  No results found for: LABABO, [address]    Anesthesia Evaluation  Patient summary reviewed and Nursing notes reviewed no history of anesthetic complications:   Airway: Mallampati: II  TM distance: >3 FB   Neck ROM: full  Mouth opening: > = 3 FB Dental:          Pulmonary:   (+) pneumonia: unresolved,  COPD:  rhonchi,      (-) asthma, shortness of breath, recent URI, sleep apnea, wheezes, rales, stridor and not a current smoker                           Cardiovascular:  Exercise tolerance: poor (<4 METS),   (+) hypertension:,     (-) murmur, weak pulses,  friction rub, systolic click, carotid bruit,  JVD and peripheral edema    ECG reviewed  Rhythm: irregular    Echocardiogram reviewed                  Neuro/Psych:   Negative Neuro/Psych ROS              GI/Hepatic/Renal: Neg GI/Hepatic/Renal ROS            Endo/Other: Negative Endo/Other ROS                    Abdominal:           Vascular: negative vascular ROS. Anesthesia Plan      general and MAC     ASA 4     (ASA 4 for COPD with multiple co-morbidities including Afib, anemia, aspiration pneumonia, etc.  Will do anesthetic in main OR)  Induction: intravenous. Anesthetic plan and risks discussed with patient. Plan discussed with CRNA. Attending anesthesiologist reviewed and agrees with Pre Eval content      Echo: 07/17/2018  Summary  Left ventricle is normal in size with low normal to normal systolic  function. Estimated ejection fraction is 50-55%. Difficult to evaluate due to  irregular heart beat. Left atrium is moderately dilated. Increased left atrial volume. Aortic sclerosis without stenosis. Mild aortic insufficiency. Mild mitral regurgitation. Moderate tricuspid regurgitation. Estimated right ventricular systolic pressure is 37 mmHg. Dilated IVC with normal inspiratory collapse.       BRIANNE Streeter - CRNA   7/30/2018

## 2018-07-30 NOTE — PROGRESS NOTES
Sleep..   Yes Historical Provider, MD   calcium carbonate 1500 (600 Ca) MG TABS tablet Take 1 tablet by mouth daily  Patient taking differently: Take 600 mg by mouth daily Pt does not take 7/19/18  Yes Nicholas Chaves MD   vitamin D (ERGOCALCIFEROL) 79990 units CAPS capsule Take 1 capsule by mouth once a week for 11 doses 7/18/18 9/27/18 Yes Nicholas Chaves MD   COMBIVENT RESPIMAT  MCG/ACT AERS inhaler INHALE 1 PUFF 4 TIMES A DAY AS DIRECTED 12/26/17  Yes Rahul Fink MD   ipratropium (ATROVENT) 0.06 % nasal spray Pt does not take 11/9/16  Yes Historical Provider, MD   XARELTO 20 MG TABS tablet 20 mg daily (with breakfast)  11/9/16  Yes Historical Provider, MD   dextromethorphan-guaiFENesin (ROBITUSSIN-DM)  MG/5ML syrup Take 10 mLs by mouth every 6 hours as needed for Cough. 3/10/15  Yes Rayo Cornejo MD   budesonide-formoterol (SYMBICORT) 160-4.5 MCG/ACT AERO Inhale 2 puffs into the lungs 2 times daily Pt does not take   Yes Historical Provider, MD   tamsulosin (FLOMAX) 0.4 MG capsule Take 0.3 mg by mouth daily    Yes Historical Provider, MD   metoprolol (TOPROL-XL) 25 MG XL tablet Take 12.5 mg by mouth 2 times daily. Yes Historical Provider, MD   rivastigmine (EXELON) 9.5 MG/24HR Place 1 patch onto the skin daily  Patient taking differently: Place 1 patch onto the skin daily Pt does not take 7/17/18   Biju Fernando MD   colchicine (COLCRYS) 0.6 MG tablet Take 1 tablet by mouth 2 times daily  Patient taking differently: Take 0.6 mg by mouth 2 times daily Pt does not take 4/30/18   Betty Cousins, DPM   AFLURIA PRESERVATIVE FREE 0.5 ML CHARLIE injection  10/10/16   Historical Provider, MD       ALLERGIES      Codeine; Fentanyl; and Pcn [penicillins]    SOCIAL HISTORY       reports that he has quit smoking. He has never used smokeless tobacco. He reports that he drinks alcohol. He reports that he does not use drugs.      FAMILY HISTORY      family history includes Cancer in his brother, father, and sister. REVIEW OF SYSTEMS      · Constitutional: positive for confusion  · Eyes: Negative for visual changes, diplopia, scleral icterus. · ENT: Negative for Headaches, hearing loss, vertigo, mouth sores, sore throat. · Cardiovascular: Tachycardic  · Respiratory: Negative for cough or wheezing, sputum production, hemoptysis, pleuritic pain. · Gastrointestinal: Positive for blood in stools. · Genitourinary:Negative for change in bladder habits, dysuria, trouble voiding, hematuria. · Musculoskeletal: Negative for gait disturbance, weakness, joint complaints. · Integumentary: Negative for rash, pruritis. · Neurological: Negative for headache, dizziness, change in muscle strength, numbness/tingling, change in gait, balance, coordination,   · Psychiatric: negative for change in mood, affect, memory, mentation, behavior. · Endocrine: negative for temperature intolerance, excessive thirst, fluid intake, or urination, tremor. · Hematologic/Lymphatic: negative for abnormal bruising or bleeding, blood clots, swollen lymph nodes. · Allergic/Immunologic: negative for nasal congestion, pruritis, hives. PHYSICAL EXAM      BP (!) 148/98   Pulse 126   Temp 97.9 °F (36.6 °C) (Oral)   Resp 21   Ht 5' 6.93\" (1.7 m)   Wt 139 lb 12.4 oz (63.4 kg)   SpO2 100%   BMI 21.94 kg/m²      · General appearance: well nourished  · HEENT: Head: Normocephalic, no lesions, without obvious abnormality. · Lungs: decreased breath sounds on right side.   · Heart:  irregular rate and rhythm, S1, S2 normal  · Abdomen: soft, non-tender; bowel sounds normal  · Extremities: extremities normal, atraumatic, no cyanosis or edema  · Neurological: alert but not oriented , no focal deficit   · Skin - no rash, no lump   · Eye no icterus no redness  · Psych-normal affect   · NEURO-no limb weakness  No facial droop  · Lymphatic system-no lymphadenopathy no splenomegaly     DIAGNOSTICS      Laboratory Testing:  CBC:   Recent Labs      07/30/18 0754   HGB  9.0*     BMP:    Recent Labs      07/28/18   0403  07/29/18   0541  07/30/18   0754   NA  138  136  136   K  4.3  3.8  3.6*   CL  103  102  103   CO2  23  21  20   BUN  30*  15  13   CREATININE  0.71  0.62*  0.51*   GLUCOSE  100*  83  107*     S. Calcium:  Recent Labs      07/30/18   0754   CALCIUM  7.9*     INR:   Recent Labs      07/28/18   0403   INR  1.3     Hepatic functions:   No results for input(s): ALKPHOS, ALT, AST, PROT, BILITOT, BILIDIR, LABALBU in the last 72 hours. Thyroid functions:   Lab Results   Component Value Date    TSH 1.28 06/17/2015        Imaging/Diagonstics:  Xr Pelvis (1-2 Views)    Result Date: 7/14/2018  EXAMINATION: SINGLE XRAY VIEW OF THE PELVIS 7/14/2018 10:25 am COMPARISON: None. HISTORY: ORDERING SYSTEM PROVIDED HISTORY: Trauma/Fracture TECHNOLOGIST PROVIDED HISTORY: AP, Inlet and Outlet views please, thank you. Reason for exam:->Trauma/Fracture Ordering Physician Provided Reason for Exam: sacral fx,    inlet outlet views Acuity: Acute Type of Exam: Unknown FINDINGS: Bones are osteoporotic. Small nondisplaced sacral fractures demonstrated on CT are not clearly demonstrated radiographically. Other portions of the pelvis appear unremarkable. Bones are osteoporotic. Small nondisplaced sacral fractures demonstrated on CT are not clearly demonstrated radiographically. Xr Pelvis (1-2 Views)    Result Date: 7/14/2018  EXAMINATION: SINGLE XRAY VIEW OF THE PELVIS 7/14/2018 9:43 am COMPARISON: None. HISTORY: ORDERING SYSTEM PROVIDED HISTORY: SACRAL FRACTURE TECHNOLOGIST PROVIDED HISTORY: Reason for exam:->SACRAL FRACTURE FINDINGS: Bilateral sacral fractures are not as well visualized as on prior CT. Right and left hemipelvis appear intact articulating normally at the SI joints and pubic symphysis. Visualized portions of the proximal right and left femur appear unremarkable. Poor visualization of bilateral sacral fractures seen on CT pelvis.  Correlate with CT pelvis. Xr Abdomen (kub) (single Ap View)    Result Date: 7/15/2018  EXAMINATION: SINGLE SUPINE XRAY VIEW(S) OF THE ABDOMEN 7/15/2018 8:15 pm COMPARISON: March 3, 2015 HISTORY: ORDERING SYSTEM PROVIDED HISTORY: pain while urination, UA negative for UTI, recent fall with non displaced sacral fractures TECHNOLOGIST PROVIDED HISTORY: Reason for exam:->pain while urination, UA negative for UTI, recent fall with non displaced sacral fractures FINDINGS: No abnormally dilated loops of stool are seen. Mild gaseous distention of colon. No abnormal calcifications. Advanced lumbar spine degenerative changes are seen. Moderate-severe left hip osteoarthritis. No abnormally dilated loops of small bowel. Mild gaseous distention of colon. Ct Head Wo Contrast    Result Date: 7/25/2018  EXAMINATION: CT OF THE HEAD WITHOUT CONTRAST  7/25/2018 5:31 pm TECHNIQUE: CT of the head was performed without the administration of intravenous contrast. Dose modulation, iterative reconstruction, and/or weight based adjustment of the mA/kV was utilized to reduce the radiation dose to as low as reasonably achievable. COMPARISON: 14 July 2018 HISTORY: ORDERING SYSTEM [facility] TE[facility]NOLOGIST PROVIDED HISTORY: Has a \"code stroke\" or \"stroke alert\" been called? ->No FINDINGS: BRAIN/VENTRICLES: There is no acute intracranial hemorrhage, mass effect or midline shift. No abnormal extra-axial fluid collection. The gray-white differentiation is maintained without evidence of an acute infarct. There is no evidence of hydrocephalus. Vertebral and internal carotid arteries are heavily calcified. Scattered hypodensity is present in the white matter compatible with chronic white matter microvascular change. ORBITS: The visualized portion of the orbits demonstrate no acute abnormality. SINUSES: The visualized paranasal sinuses and mastoid air cells demonstrate no acute abnormality.  SOFT TISSUES/SKULL:  No acute abnormality of the visualized skull or soft tissues. Estimated biologic radiation dose for this procedure:1241.39 mGy/cm2. No acute intracranial abnormality. Senescent changes including chronic microvascular change. Atherosclerotic disease of the major intracranial vessels. Ct Head Wo Contrast    Result Date: 7/14/2018  EXAMINATION: CT OF THE HEAD WITHOUT CONTRAST; CT OF THE CERVICAL SPINE WITHOUT CONTRAST 7/14/2018 8:27 am TECHNIQUE: CT of the head was performed without the administration of intravenous contrast. Dose modulation, iterative reconstruction, and/or weight based adjustment of the mA/kV was utilized to reduce the radiation dose to as low as reasonably achievable.; CT of the cervical spine was performed without the administration of intravenous contrast. Multiplanar reformatted images are provided for review. Dose modulation, iterative reconstruction, and/or weight based adjustment of the mA/kV was utilized to reduce the radiation dose to as low as reasonably achievable. COMPARISON: None. HISTORY: ORDERING SYSTEM PROVIDED HISTORY: fall, on xarelto Headache and neck pain following trauma CT HEAD FINDINGS: BRAIN/VENTRICLES: There is no acute intracranial hemorrhage, mass effect or midline shift. No abnormal extra-axial fluid collection. The gray-white differentiation is maintained without evidence of an acute infarct. There is prominence of the ventricles and sulci due to global parenchymal volume loss. There are nonspecific areas of hypoattenuation within the periventricular and subcortical white matter, which likely represent chronic microvascular ischemic change. ORBITS: The visualized portion of the orbits demonstrate no acute abnormality. SINUSES: The visualized paranasal sinuses and mastoid air cells demonstrate no acute abnormality. SOFT TISSUES/SKULL: No acute abnormality of the visualized skull or soft tissues. Calcifications involving bilateral carotid vasculature reflect calcific atherosclerosis.  CT CERVICAL SPINE FINDINGS: BONES/ALIGNMENT: 7 typical cervical vertebra present maintain normal height and alignment. DEGENERATIVE CHANGES: Degenerative disc disease and facet and uncovertebral joint arthrosis predominate mid and lower cervical spine. No canal stenosis or significant neural foraminal narrowing is seen. SOFT TISSUES: Soft tissue structures surrounding the cervical spine appear unremarkable. Visualized portions of the upper hemithoraces, skullbase and posterior fossa contents appear unremarkable. Calcifications involving bilateral carotid vasculature reflect calcific atherosclerosis. Emphysematous changes are noted at the lung apices. No acute intracranial abnormality. Degenerative changes involving the cervical spine described above. No cervical fracture or acute traumatic subluxation. Calcifications involving bilateral carotid vasculature reflect calcific atherosclerosis. Emphysema. Note that if pain persists or worsens, or if clinically there is concern for CT occult acute cervical abnormality, flexion/extension C-spine series or MRI cervical spine may be considered for additional evaluation. Ct Cervical Spine Wo Contrast    Result Date: 7/14/2018  EXAMINATION: CT OF THE HEAD WITHOUT CONTRAST; CT OF THE CERVICAL SPINE WITHOUT CONTRAST 7/14/2018 8:27 am TECHNIQUE: CT of the head was performed without the administration of intravenous contrast. Dose modulation, iterative reconstruction, and/or weight based adjustment of the mA/kV was utilized to reduce the radiation dose to as low as reasonably achievable.; CT of the cervical spine was performed without the administration of intravenous contrast. Multiplanar reformatted images are provided for review. Dose modulation, iterative reconstruction, and/or weight based adjustment of the mA/kV was utilized to reduce the radiation dose to as low as reasonably achievable. COMPARISON: None.  HISTORY: ORDERING SYSTEM PROVIDED HISTORY: fall, on xarelto Headache and neck pain following trauma CT HEAD FINDINGS: BRAIN/VENTRICLES: There is no acute intracranial hemorrhage, mass effect or midline shift. No abnormal extra-axial fluid collection. The gray-white differentiation is maintained without evidence of an acute infarct. There is prominence of the ventricles and sulci due to global parenchymal volume loss. There are nonspecific areas of hypoattenuation within the periventricular and subcortical white matter, which likely represent chronic microvascular ischemic change. ORBITS: The visualized portion of the orbits demonstrate no acute abnormality. SINUSES: The visualized paranasal sinuses and mastoid air cells demonstrate no acute abnormality. SOFT TISSUES/SKULL: No acute abnormality of the visualized skull or soft tissues. Calcifications involving bilateral carotid vasculature reflect calcific atherosclerosis. CT CERVICAL SPINE FINDINGS: BONES/ALIGNMENT: 7 typical cervical vertebra present maintain normal height and alignment. DEGENERATIVE CHANGES: Degenerative disc disease and facet and uncovertebral joint arthrosis predominate mid and lower cervical spine. No canal stenosis or significant neural foraminal narrowing is seen. SOFT TISSUES: Soft tissue structures surrounding the cervical spine appear unremarkable. Visualized portions of the upper hemithoraces, skullbase and posterior fossa contents appear unremarkable. Calcifications involving bilateral carotid vasculature reflect calcific atherosclerosis. Emphysematous changes are noted at the lung apices. No acute intracranial abnormality. Degenerative changes involving the cervical spine described above. No cervical fracture or acute traumatic subluxation. Calcifications involving bilateral carotid vasculature reflect calcific atherosclerosis. Emphysema.  Note that if pain persists or worsens, or if clinically there is concern for CT occult acute cervical abnormality, flexion/extension C-spine series or WITHOUT CONTRAST; CT OF THE THORACIC SPINE WITHOUT CONTRAST 7/14/2018 TECHNIQUE: CT of the lumbar spine was performed without the administration of intravenous contrast. Multiplanar reformatted images are provided for review. Dose modulation, iterative reconstruction, and/or weight based adjustment of the mA/kV was utilized to reduce the radiation dose to as low as reasonably achievable.; CT of the thoracic spine was performed without the administration of intravenous contrast. Multiplanar reformatted images are provided for review. Dose modulation, iterative reconstruction, and/or weight based adjustment of the mA/kV was utilized to reduce the radiation dose to as low as reasonably achievable. COMPARISON: None. HISTORY: ORDERING SYSTEM PROVIDED HISTORY: back pain TECHNOLOGIST PROVIDED HISTORY: Reason for exam:->back pain; ORDERING SYSTEM PROVIDED HISTORY: back pain s/p fall FINDINGS: BONES/ALIGNMENT:  Nondisplaced fractures left L3 and L5 transverse processes. No acute compression fracture of the thoracic or lumbar spine. There is normal alignment of the spine. Bone mineralization is abnormally low. Fractures are noted bilaterally through the 1st and 2nd sacral alae. DEGENERATIVE CHANGES: No significant degenerative changes of the thoracic or lumbar spine. Bilateral SI joint osteoarthritis. SOFT TISSUES: No paraspinal mass is seen. Changes of emphysema. Prominent bibasilar bronchiectasis. Calcific atherosclerotic disease aorta. Nondisplaced fractures left L3 and L5 transverse processes. Nondisplaced bilateral sacral fractures. No lumbar compression fracture is evident. No evidence of acute traumatic injury of the thoracic spine. Bones are osteoporotic.      Ct Pelvis Wo Contrast Additional Contrast? None    Result Date: 7/14/2018  EXAMINATION: CT OF THE PELVIS WITHOUT CONTRAST 7/14/2018 7:37 am TECHNIQUE: CT of the pelvis was performed without the administration of intravenous contrast.  Multiplanar lobe  No free subdiaphragmatic air. 1. Developing right lower lobe airspace disease likely pneumonia. 2. Increased markings at left lung base may relate to interstitial edema or emphysema. RECOMMENDATION: A progress study after appropriate antibiotic therapy to ensure resolution is advised. Xr Chest Portable    Result Date: 7/14/2018  EXAMINATION: SINGLE XRAY VIEW OF THE CHEST 7/14/2018 7:16 am COMPARISON: Chest April 30, 2018. HISTORY: ORDERING SYSTEM PROVIDED HISTORY: syncope TECHNOLOGIST PROVIDED HISTORY: Reason for exam:->syncope FINDINGS: Heart appears normal in size. COPD changes. No focal lung consolidation, pneumothorax, pleural effusion or free air. No free air. No acute process. COPD changes. Xr Pelvis (min 3 Views)    Result Date: 7/26/2018  EXAMINATION: SINGLE XRAY VIEW OF THE PELVIS 7/25/2018 10:04 pm COMPARISON: 07/14/2018 HISTORY: ORDERING SYSTEM PROVIDED HISTORY: Trauma/Fracture TECHNOLOGIST PROVIDED HISTORY: AP, Inlet and Outlet views please, thank you. Reason for exam:->Trauma/Fracture FINDINGS: The examination is limited by diffuse bony demineralization. No definite change from prior radiograph. Known left L5 transverse process fracture and sacral fractures are better appreciated on prior CT study. Left greater than right femoral head ____and degenerative changes, also better appreciated on the prior CT study. Grossly the pelvic ring appears intact and unchanged. No acute soft tissue abnormality. Limited examination secondary to diffuse bony demineralization. The radiograph is unchanged from the prior study. Left L5 transverse process and sacral fractures are better appreciated on prior CT study. If there is concern for acute or new fractures, a CT is recommended.      ASSESSMENT     Principal Problem:    Community acquired pneumonia of right lower lobe of lung ([address]  Active Problems:    COPD (chronic obstructive pulmonary disease) ([address]    Atrial fibrillation ([address] HLD (hyperlipidemia)    Altered mental state    Aspiration pneumonia ([facility] [address]    Sacral fracture (HCC)    Metabolic encephalopathy    Vitamin D deficiency    Melena    Anticoagulated  Resolved Problems:    * No resolved hospital problems. *        PLAN      1. Aspiration PNA on  IV Clindamycin and Levaquin, sputum culture, strep pna ag and legionella ag, negative. 2. Duodenal ulcers on EGD. H n H every 12 hrs, Transfuse if Hb below 7. Protonix gtt per GI. NPO exception to sips and pils and maintaince fluids at 75 ml/hr. Total fluids to 100 ml/hr. On Tums daily. Avoid NSAIDS. 3. A-fib rate controlled on  Toprol. Prn lopressor IV 5 mg. Xarelto On Hold due to GI bleed. 4. Diplopia. Opthalmology consulted. 5. Vit D deficiency on replacement. 6. B/L sacral fractures pain management with IV morphine and norco q 6 hr prn  7. Fall precautions, aspiration precautions. 8. Alzheimer Dementia on Aricept. Neurology sign off. Seroquel 12.5. Dc on discharge per neuro. 9. BPH on Flomax. 10. COPD. On Dulera BID and Dulera TID. Albuterol prn. 11. DVT Px EPC  12. SW  13. PT OT consulted. Dyan Alcantara MD      Department of Internal Medicine  [facility]         7/30/2018, 9:16 AM      IM Attending    Pt seen and examined today   I have discussed the care of pt , including pertinent history and exam findings,  with the resident. I have reviewed the key elements of all parts of the encounter with the resident. I agree with the assessment, plan and orders as documented by the resident.     Electronically signed by Hosea Villalta MD on 7/30/2018 at 7:02 PM

## 2018-07-30 NOTE — ANESTHESIA POSTPROCEDURE EVALUATION
Department of Anesthesiology  Postprocedure Note    Patient: Washington Dawkins  MRN: [id number]  [place]: 10/26/1931  Date of evaluation: 7/30/2018  Time:  7:19 PM     Procedure Summary     Date:  07/30/18 Room / Location:  [facility] OR  / [facility] OR    Anesthesia Start:  6331 Anesthesia Stop:  7277    Procedure:  EGD (N/A ) Diagnosis:  (MELENA)    Surgeon:  Shaggy Figueredo MD Responsible Provider:      Anesthesia Type:  general, MAC ASA Status:  4          Anesthesia Type: general, MAC    Kenia Phase I: Kenia Score: 9    Kenia Phase II:      Last vitals: Reviewed and per EMR flowsheets.        Anesthesia Post Evaluation    Patient location during evaluation: PACU  Patient participation: complete - patient participated  Level of consciousness: awake and alert  Pain score: 1  Airway patency: patent  Nausea & Vomiting: no nausea and no vomiting  Complications: no  Cardiovascular status: hemodynamically stable  Respiratory status: acceptable  Hydration status: euvolemic

## 2018-07-30 NOTE — PROGRESS NOTES
Diplopia as reported by RN. Pt has history of cataract surgery in past. We will consult opthalmology at this time. Diplopia resolved.

## 2018-07-30 NOTE — PLAN OF CARE
Problem: Falls - Risk of:  Goal: Will remain free from falls  Will remain free from falls   Outcome: Ongoing      Problem: Risk for Impaired Skin Integrity  Goal: Tissue integrity - skin and mucous membranes  Structural intactness and normal physiological function of skin and  mucous membranes.    Outcome: Met This Shift      Problem: Pain:  Goal: Pain level will decrease  Pain level will decrease   Outcome: Met This Shift

## 2018-07-31 LAB
ABO/RH: NORMAL
ANION GAP SERPL CALCULATED.3IONS-SCNC: 12 MMOL/L (ref 9–17)
ANTIBODY SCREEN: NEGATIVE
ARM BAND NUMBER: NORMAL
BLD PROD TYP BPU: NORMAL
BUN BLDV-MCNC: 10 MG/DL (ref 8–23)
BUN/CREAT BLD: ABNORMAL (ref 9–20)
CALCIUM SERPL-MCNC: 8.1 MG/DL (ref 8.6–10.4)
CHLORIDE BLD-SCNC: 101 MMOL/L (ref 98–107)
CO2: 22 MMOL/L (ref 20–31)
CREAT SERPL-MCNC: 0.65 MG/DL (ref 0.7–1.2)
CROSSMATCH RESULT: NORMAL
CULTURE: NORMAL
CULTURE: NORMAL
DISPENSE STATUS BLOOD BANK: NORMAL
EXPIRATION DATE: NORMAL
GFR AFRICAN AMERICAN: >60 ML/MIN
GFR NON-AFRICAN AMERICAN: >60 ML/MIN
GFR SERPL CREATININE-BSD FRML MDRD: ABNORMAL ML/MIN/{1.73_M2}
GFR SERPL CREATININE-BSD FRML MDRD: ABNORMAL ML/MIN/{1.73_M2}
GLUCOSE BLD-MCNC: 72 MG/DL (ref 70–99)
HCT VFR BLD CALC: 29.1 % (ref 40.7–50.3)
HEMOGLOBIN: 8.9 G/DL (ref 13–17)
Lab: NORMAL
Lab: NORMAL
POTASSIUM SERPL-SCNC: 3.7 MMOL/L (ref 3.7–5.3)
SODIUM BLD-SCNC: 135 MMOL/L (ref 135–144)
SPECIMEN DESCRIPTION: NORMAL
SPECIMEN DESCRIPTION: NORMAL
STATUS: NORMAL
STATUS: NORMAL
TRANSFUSION STATUS: NORMAL
UNIT DIVISION: 0
UNIT NUMBER: NORMAL

## 2018-07-31 PROCEDURE — 97116 GAIT TRAINING THERAPY: CPT

## 2018-07-31 PROCEDURE — 2060000000 HC ICU INTERMEDIATE R&B

## 2018-07-31 PROCEDURE — 6360000002 HC RX W HCPCS: Performed by: INTERNAL MEDICINE

## 2018-07-31 PROCEDURE — 2500000003 HC RX 250 WO HCPCS: Performed by: INTERNAL MEDICINE

## 2018-07-31 PROCEDURE — 92526 ORAL FUNCTION THERAPY: CPT

## 2018-07-31 PROCEDURE — 99232 SBSQ HOSP IP/OBS MODERATE 35: CPT | Performed by: INTERNAL MEDICINE

## 2018-07-31 PROCEDURE — 6370000000 HC RX 637 (ALT 250 FOR IP): Performed by: INTERNAL MEDICINE

## 2018-07-31 PROCEDURE — C9113 INJ PANTOPRAZOLE SODIUM, VIA: HCPCS | Performed by: INTERNAL MEDICINE

## 2018-07-31 PROCEDURE — 97535 SELF CARE MNGMENT TRAINING: CPT

## 2018-07-31 PROCEDURE — 36415 COLL VENOUS BLD VENIPUNCTURE: CPT

## 2018-07-31 PROCEDURE — 85014 HEMATOCRIT: CPT

## 2018-07-31 PROCEDURE — 85018 HEMOGLOBIN: CPT

## 2018-07-31 PROCEDURE — 94640 AIRWAY INHALATION TREATMENT: CPT

## 2018-07-31 PROCEDURE — 2580000003 HC RX 258: Performed by: INTERNAL MEDICINE

## 2018-07-31 PROCEDURE — 94762 N-INVAS EAR/PLS OXIMTRY CONT: CPT

## 2018-07-31 PROCEDURE — 80048 BASIC METABOLIC PNL TOTAL CA: CPT

## 2018-07-31 PROCEDURE — 97127 HC SP THER IVNTJ W/FOCUS COG FUNCJ: CPT

## 2018-07-31 PROCEDURE — 97530 THERAPEUTIC ACTIVITIES: CPT

## 2018-07-31 PROCEDURE — 6360000002 HC RX W HCPCS: Performed by: NURSE PRACTITIONER

## 2018-07-31 RX ORDER — PANTOPRAZOLE SODIUM 40 MG/1
40 TABLET, DELAYED RELEASE ORAL
Status: DISCONTINUED | OUTPATIENT
Start: 2018-08-01 | End: 2018-08-01 | Stop reason: HOSPADM

## 2018-07-31 RX ADMIN — CLINDAMYCIN PHOSPHATE 600 MG: 600 INJECTION, SOLUTION INTRAVENOUS at 06:37

## 2018-07-31 RX ADMIN — SODIUM CHLORIDE 8 MG/HR: 9 INJECTION, SOLUTION INTRAVENOUS at 00:44

## 2018-07-31 RX ADMIN — CLINDAMYCIN PHOSPHATE 600 MG: 600 INJECTION, SOLUTION INTRAVENOUS at 13:30

## 2018-07-31 RX ADMIN — TAMSULOSIN HYDROCHLORIDE 0.4 MG: 0.4 CAPSULE ORAL at 08:55

## 2018-07-31 RX ADMIN — Medication 10 ML: at 08:59

## 2018-07-31 RX ADMIN — HYDROCODONE BITARTRATE AND ACETAMINOPHEN 1 TABLET: 5; 325 TABLET ORAL at 21:07

## 2018-07-31 RX ADMIN — MOMETASONE FUROATE AND FORMOTEROL FUMARATE DIHYDRATE 2 PUFF: 200; 5 AEROSOL RESPIRATORY (INHALATION) at 10:20

## 2018-07-31 RX ADMIN — LEVOFLOXACIN 750 MG: 5 INJECTION, SOLUTION INTRAVENOUS at 16:00

## 2018-07-31 RX ADMIN — METOPROLOL SUCCINATE 25 MG: 25 TABLET, EXTENDED RELEASE ORAL at 08:55

## 2018-07-31 RX ADMIN — MORPHINE SULFATE 2 MG: 2 INJECTION, SOLUTION INTRAMUSCULAR; INTRAVENOUS at 03:48

## 2018-07-31 RX ADMIN — HYDROCODONE BITARTRATE AND ACETAMINOPHEN 1 TABLET: 5; 325 TABLET ORAL at 08:54

## 2018-07-31 RX ADMIN — IPRATROPIUM BROMIDE AND ALBUTEROL SULFATE 1 AMPULE: .5; 3 SOLUTION RESPIRATORY (INHALATION) at 10:20

## 2018-07-31 RX ADMIN — DIPHENHYDRAMINE HYDROCHLORIDE 25 MG: 50 INJECTION, SOLUTION INTRAMUSCULAR; INTRAVENOUS at 00:06

## 2018-07-31 RX ADMIN — QUETIAPINE FUMARATE 12.5 MG: 25 TABLET ORAL at 21:08

## 2018-07-31 RX ADMIN — IPRATROPIUM BROMIDE AND ALBUTEROL SULFATE 1 AMPULE: .5; 3 SOLUTION RESPIRATORY (INHALATION) at 15:03

## 2018-07-31 RX ADMIN — MOMETASONE FUROATE AND FORMOTEROL FUMARATE DIHYDRATE 2 PUFF: 200; 5 AEROSOL RESPIRATORY (INHALATION) at 21:07

## 2018-07-31 RX ADMIN — CLINDAMYCIN PHOSPHATE 600 MG: 600 INJECTION, SOLUTION INTRAVENOUS at 21:07

## 2018-07-31 RX ADMIN — DONEPEZIL HYDROCHLORIDE 10 MG: 10 TABLET, FILM COATED ORAL at 21:30

## 2018-07-31 RX ADMIN — IPRATROPIUM BROMIDE AND ALBUTEROL SULFATE 1 AMPULE: .5; 3 SOLUTION RESPIRATORY (INHALATION) at 21:07

## 2018-07-31 RX ADMIN — Medication 600 MG: at 08:55

## 2018-07-31 ASSESSMENT — PAIN DESCRIPTION - PAIN TYPE
TYPE: ACUTE PAIN
TYPE: CHRONIC PAIN

## 2018-07-31 ASSESSMENT — PAIN DESCRIPTION - FREQUENCY: FREQUENCY: INTERMITTENT

## 2018-07-31 ASSESSMENT — PAIN DESCRIPTION - PROGRESSION: CLINICAL_PROGRESSION: NOT CHANGED

## 2018-07-31 ASSESSMENT — PAIN DESCRIPTION - LOCATION
LOCATION: COCCYX
LOCATION: BACK;COCCYX;HIP

## 2018-07-31 ASSESSMENT — PAIN SCALES - GENERAL
PAINLEVEL_OUTOF10: 4
PAINLEVEL_OUTOF10: 6
PAINLEVEL_OUTOF10: 4
PAINLEVEL_OUTOF10: 7

## 2018-07-31 ASSESSMENT — PAIN DESCRIPTION - ORIENTATION: ORIENTATION: LEFT;LOWER

## 2018-07-31 NOTE — PROGRESS NOTES
Speech Language Pathology  Speech Language Pathology  [facility]    Cognitive and Dysphagia Treatment Note    Date: 7/31/2018  Patients Name: Cristina Leslie  MRN: [id number]  Diagnosis:   Patient Active Problem List   Diagnosis Code    Giant cell arteritis ([facility] [address] M31.6    Syncope R55    COPD (chronic obstructive pulmonary disease) ([address] J44.9    Atrial fibrillation (HCC) I48.91    Acute bronchitis J20.9    COPD exacerbation ([address] J44.1    HLD (hyperlipidemia) E78.5    Anemia D64.9    Tubular adenoma of colon D12.6    Anemia, blood loss D50.0    Small bowel stricture K56.699    Right inguinal hernia K40.90    Diverticulitis of large intestine without perforation or abscess without bleeding K57.32    Left lower quadrant pain R10.32    Cellulitis L03.90    Syncope and collapse R55    Altered mental state R41.82    Community acquired pneumonia of right lower lobe of lung ([address] J18.1    Aspiration pneumonia ([address] J69.0    Sacral fracture ([address] K60.70RT    Metabolic encephalopathy B92.09    Vitamin D deficiency E55.9    HCAP (healthcare-associated pneumonia) J18.9    Melena K92.1    Anticoagulated Z79.01       Pain: 0/10    Cognitive Treatment    Treatment time:8124-5003      Subjective: [x] Alert [x] Cooperative     [] Confused     [] Agitated    [] Lethargic    Family concerned that Pt was receiving clear liquid diet without liquids at pudding thick consistency. ST contacted RN to clarify current diet at Dysphagia I Pureed with Pudding thick liquids. Objective/Assessment:    Orientation: Pt oriented to self and president independently.  Oriented to year, month, and place with maximal verbal cueing and prompting    Problem Solving/Reasoning: Problem Solving Answering Questions: 10/10 independently      Yes No Questions Moderate Complexity: 11/15 increased to 15/15 with maximal verbal prompting    Other: Verbal Sequencing ADLs: 100% X2 tasks independently     Pt completed

## 2018-07-31 NOTE — PROGRESS NOTES
Occupational Therapy  Facility/Department: [facility] CAR 3  Daily Treatment Note  NAME: Luke Mancilla  : 10/26/1931  MRN: [id number]    Date of Service: 2018    Discharge Recommendations:  [address]       Patient Diagnosis(es): The primary encounter diagnosis was Delirium. A diagnosis of Pneumonia due to organism was also pertinent to this visit. has a past medical history of Atrial fibrillation ([facility] Cancer [facility]); COPD (chronic obstructive pulmonary disease) ([facility] Hernia, inguinal, bilateral; and Hypertension. has a past surgical history that includes Small intestine surgery; vascular surgery (Bilateral, 2014); Cataract removal; Colonoscopy; Colonoscopy (5/12/15); Appendectomy; Cardiac surgery (Right, 5-29-15); Upper gastrointestinal endoscopy (N/A, 2018); and Upper gastrointestinal endoscopy (2018). Restrictions  Restrictions/Precautions  Restrictions/Precautions: Weight Bearing, Fall Risk (pt with recent pelvis fx, very painful to sit)  Required Braces or Orthoses?: No  Lower Extremity Weight Bearing Restrictions  Right Lower Extremity Weight Bearing: Weight Bearing As Tolerated  Left Lower Extremity Weight Bearing: Weight Bearing As Tolerated  Position Activity Restriction  Other position/activity restrictions: Up with assist, ambulation with assist   Subjective   General  Patient assessed for rehabilitation services?: Yes  Family / Caregiver Present: No  General Comment  Comments: RN ok'd OT tx  Pain Assessment  Patient Currently in Pain: Yes  Pain Assessment: 0-10  Pain Level: 4 (Increases with movement)  Pain Type: Chronic and Acute pain  Pain Location: Back; Coccyx; Hip  Pain Orientation: Left; Lower  Clinical Progression: Not changed  Pain Intervention(s): Medication  Response to Pain Intervention: Patient Satisfied  Orientation  A&OX    Objective    ADL  Grooming: Dependent/Total;Setup;Verbal cueing (to wash face)  UE Dressing: Maximum assistance (w/gown)  LE Dressing: Dependent/Total (w/footies)  Toileting: Dependent/Total (wearing brief)      Balance  Sitting Balance: Maximum assistance (sitting eob, pushing hard to the right to lay back down)  Standing Balance: Moderate assistance (x2 w/RW)  Standing Balance  Time: Pt stood for approx 10-15 min total for transfers and func mob with RW  Sit to stand: Moderate assistance (x2)  Stand to sit: Moderate assistance (x2)  Comment: RW, modx2  Functional Mobility  Functional - Mobility Device: Rolling Walker  Assist Level: Moderate assistance (x2)  Bed mobility  Rolling to Left: Minimal assistance  Rolling to Right: Minimal assistance  Supine to Sit: Dependent/Total (pt pushing hard to right to lay back down-pt not assisting d/t pain)  Sit to Supine: Maximum assistance (x2)  Transfers  Stand Step Transfers: Moderate assistance (x2 w/RW)  Sit to stand: Moderate assistance (x2)  Stand to sit: Moderate assistance (x2)     Assessment   Performance deficits / Impairments: Decreased ADL status; Decreased functional mobility ; Decreased safe awareness;Decreased cognition;Decreased endurance;Decreased high-level IADLs;Decreased balance  Assessment: Pt would benefit from continued OT status post acute discharge to address above noted deficits  Prognosis: Fair  Patient Education: Role of OT, OT POC, safety wtih ADL/bed mobility, importance of mobility, safety with func mob and adls. Pt needs constant reinforcement. REQUIRES OT FOLLOW UP: Yes  Activity Tolerance  Activity Tolerance: Patient Tolerated treatment well;Patient limited by pain; Patient limited by fatigue  Safety Devices  Safety Devices in place: Yes  Type of devices: Left in bed;Bed alarm in place;Call light within reach       Plan   Plan  Times per week: 4-5  Current Treatment Recommendations: Self-Care / ADL, Safety Education & Training, Functional Mobility Training, Balance Training, Endurance Training, Patient/Caregiver Education & Training, Cognitive

## 2018-07-31 NOTE — PROGRESS NOTES
Physical Therapy  Facility/Department: [facility] CAR 3  Daily Treatment Note  NAME: Blake Howard  : 10/26/1931  MRN: [id number]    Date of Service: 2018    Discharge Recommendations:  [address]        Patient Diagnosis(es): The primary encounter diagnosis was Delirium. A diagnosis of Pneumonia due to organism was also pertinent to this visit. has a past medical history of Atrial fibrillation ([facility] Cancer [facility]); COPD (chronic obstructive pulmonary disease) ([facility] Hernia, inguinal, bilateral; and Hypertension. has a past surgical history that includes Small intestine surgery; vascular surgery (Bilateral, 2014); Cataract removal; Colonoscopy; Colonoscopy (5/12/15); Appendectomy; Cardiac surgery (Right, 5-29-15); Upper gastrointestinal endoscopy (N/A, 2018); and Upper gastrointestinal endoscopy (2018). Restrictions  Restrictions/Precautions  Restrictions/Precautions: Weight Bearing, Fall Risk (pt with recent pelvis fx, very painful to sit)  Required Braces or Orthoses?: No  Lower Extremity Weight Bearing Restrictions  Right Lower Extremity Weight Bearing: Weight Bearing As Tolerated  Left Lower Extremity Weight Bearing: Weight Bearing As Tolerated  Position Activity Restriction  Other position/activity restrictions: Up with assist, ambulation with assist   Subjective    Pt in bed. Pt's wife and daughter are present. Pt c/o 4/10. Orientation    Overall Orientation Status: Impaired  Objective     Bed mobility  Rolling to Right: Minimal assistance  Supine to Sit: Dependent  Sit to Supine: Max assistance  Scooting: Stand by assistance  EOB; PT demonstrates Max R Lean (Pt fearful, wanting to lay back down)  Transfers  Sit to Stand: Moderate Assistance (Mod A x 2)  Stand to sit: Moderate Assistance (Mod A x 2)  Ambulation  Ambulation?: Yes  Ambulation 1  Surface: level tile  Device: [address]:  Moderate assistance  Quality of Gait: Decreased endurance, quick fatigue, decreased step length,decreased RUBI. Distance: 75ft x 1  Balance  Posture: Good  Sitting - Static: Poor;-  Sitting - Dynamic: Poor;-  Standing - Static: Fair  Standing - Dynamic: Fair  Comments: Pt was max A for seated balance d/t spinal extension secondary to pain during sitting    Assessment     Body structures, Functions, Activity limitations: Decreased functional mobility ; Decreased endurance;Decreased coordination;Decreased ROM; Decreased ADL status; Decreased balance;Decreased strength;Decreased safe awareness;Decreased cognition   Pt demonstrated unsafe sitting balance d/t to pain from pelvic fractures. Pt required max A x 2 for bed mobility and mod A for standing and amb. Pt is unsafe to return home at this time based on mobility performance. SNF is recommended. Prognosis: Good  Medium Complexity   Patient was educated on safe mobility. Patient suffers from dementia and Alzheimer. Subacute/Skilled Nursing Facility recommended. Activity Tolerance  Activity Tolerance: Patient limited by fatigue;Patient limited by endurance; Patient limited by pain; Patient limited by cognitive status        Goals  Short term goals  Time Frame for Short term goals: 14 visits  Short term goal 1: Pt will ambulate 50ftx2 with CGA and RW with rest breaks as necessary  Short term goal 2: Pt will demonstrate good to good - sitting and standing balance to decrease the risk of falls  Short term goal 3: Pt will tolerate 30 minutes of activit for endurance  Short term goal 4: Pt will be SBA with bed mobility  Short term goal 5: Pt will be SBA with transfers  Patient Goals   Patient goals : Pt wants to be able to move freely    Plan    Plan  Times per week: 5   Times per day: Daily  Current Treatment Recommendations: ADL/Self-care Training, Strengthening, IADL Training, Neuromuscular Re-education, Safety Education & Training, Equipment Evaluation, Education, & procurement, Balance Training, Endurance Training, Transfer

## 2018-07-31 NOTE — PROGRESS NOTES
[address] [facility].    PROGRESS NOTES            Date:   7/31/2018  Patient name:  Teresa Mahoney  Date of admission:  7/25/2018  2:36 PM  MRN:   [id number]  YOB: 1931    CHIEF COMPLAINT     History Obtained From:  Patient and chart review. HISTORY OF PRESENT ILLNESS      The patient is a 80 y.o.  male who is admitted to the hospital for altered mental status, patient was seen here last admission for syncope and fall. At that time he had b/l sacral fractures and lumbar fracture which was treated conservatively by orthopedics. Has baseline dementia donepezil was discontinued in view of confusion and fall. All pain and sleep medications were minimized. And sent to SNF. This admission CXR shows Rt sided infiltrate denies fever or chills or any complaints at this time. Concern for aspiration. Passed bedside swallow evaluation, awaiting speech evaluation. Started on Levaquin and clindamycin. Ortho consulted per family request.  PMH of AD, Afib,  was on Xeralto and HTN, Vit D deficiency. Current evaluation 07/31/18  Afebrile overnight  Hemodynamically stable  BC remained negative , no sputum culture available , On clinda and levo for aspiration pneumonia day#6 today   Repeated EGD yesterday, report is not back yet. Pt had large melena/dark stools over night, Hb remained stable today 8.9  Currently on clear liquid diet. On Protonix gtt 48 hr so far   Pt reporting diplopia, opthalmology ruled out acute pathology, recommend OP follow up with his ophthalmologist on discharge . PAST MEDICAL HISTORY       has a past medical history of Atrial fibrillation ([facility] Cancer [facility]); COPD (chronic obstructive pulmonary disease) ([facility] Hernia, inguinal, bilateral; and Hypertension. PAST SURGICAL HISTORY       has a past surgical history that includes Small intestine surgery; vascular surgery (Bilateral, 05/09/2014);  Cataract removal; HISTORY: Reason for exam:->SACRAL FRACTURE FINDINGS: Bilateral sacral fractures are not as well visualized as on prior CT. Right and left hemipelvis appear intact articulating normally at the SI joints and pubic symphysis. Visualized portions of the proximal right and left femur appear unremarkable. Poor visualization of bilateral sacral fractures seen on CT pelvis. Correlate with CT pelvis. Xr Abdomen (kub) (single Ap View)    Result Date: 7/15/2018  EXAMINATION: SINGLE SUPINE XRAY VIEW(S) OF THE ABDOMEN 7/15/2018 8:15 pm COMPARISON: March 3, 2015 HISTORY: ORDERING SYSTEM PROVIDED HISTORY: pain while urination, UA negative for UTI, recent fall with non displaced sacral fractures TECHNOLOGIST PROVIDED HISTORY: Reason for exam:->pain while urination, UA negative for UTI, recent fall with non displaced sacral fractures FINDINGS: No abnormally dilated loops of stool are seen. Mild gaseous distention of colon. No abnormal calcifications. Advanced lumbar spine degenerative changes are seen. Moderate-severe left hip osteoarthritis. No abnormally dilated loops of small bowel. Mild gaseous distention of colon. Ct Head Wo Contrast    Result Date: 7/25/2018  EXAMINATION: CT OF THE HEAD WITHOUT CONTRAST  7/25/2018 5:31 pm TECHNIQUE: CT of the head was performed without the administration of intravenous contrast. Dose modulation, iterative reconstruction, and/or weight based adjustment of the mA/kV was utilized to reduce the radiation dose to as low as reasonably achievable. COMPARISON: 14 July 2018 HISTORY: ORDERING SYSTEM PROVIDED HISTORY: [facility] TECHNOLOGIST PROVIDED HISTORY: Has a \"code stroke\" or \"stroke alert\" been called? ->No FINDINGS: BRAIN/VENTRICLES: There is no acute intracranial hemorrhage, mass effect or midline shift. No abnormal extra-axial fluid collection. The gray-white differentiation is maintained without evidence of an acute infarct. There is no evidence of hydrocephalus.  Vertebral and internal carotid arteries are heavily calcified. Scattered hypodensity is present in the white matter compatible with chronic white matter microvascular change. ORBITS: The visualized portion of the orbits demonstrate no acute abnormality. SINUSES: The visualized paranasal sinuses and mastoid air cells demonstrate no acute abnormality. SOFT TISSUES/SKULL:  No acute abnormality of the visualized skull or soft tissues. Estimated biologic radiation dose for this procedure:1241.39 mGy/cm2. No acute intracranial abnormality. Senescent changes including chronic microvascular change. Atherosclerotic disease of the major intracranial vessels. Ct Head Wo Contrast    Result Date: 7/14/2018  EXAMINATION: CT OF THE HEAD WITHOUT CONTRAST; CT OF THE CERVICAL SPINE WITHOUT CONTRAST 7/14/2018 8:27 am TECHNIQUE: CT of the head was performed without the administration of intravenous contrast. Dose modulation, iterative reconstruction, and/or weight based adjustment of the mA/kV was utilized to reduce the radiation dose to as low as reasonably achievable.; CT of the cervical spine was performed without the administration of intravenous contrast. Multiplanar reformatted images are provided for review. Dose modulation, iterative reconstruction, and/or weight based adjustment of the mA/kV was utilized to reduce the radiation dose to as low as reasonably achievable. COMPARISON: None. HISTORY: ORDERING SYSTEM PROVIDED HISTORY: fall, on xarelto Headache and neck pain following trauma CT HEAD FINDINGS: BRAIN/VENTRICLES: There is no acute intracranial hemorrhage, mass effect or midline shift. No abnormal extra-axial fluid collection. The gray-white differentiation is maintained without evidence of an acute infarct. There is prominence of the ventricles and sulci due to global parenchymal volume loss.  There are nonspecific areas of hypoattenuation within the periventricular and subcortical white matter, which likely represent chronic microvascular ischemic change. ORBITS: The visualized portion of the orbits demonstrate no acute abnormality. SINUSES: The visualized paranasal sinuses and mastoid air cells demonstrate no acute abnormality. SOFT TISSUES/SKULL: No acute abnormality of the visualized skull or soft tissues. Calcifications involving bilateral carotid vasculature reflect calcific atherosclerosis. CT CERVICAL SPINE FINDINGS: BONES/ALIGNMENT: 7 typical cervical vertebra present maintain normal height and alignment. DEGENERATIVE CHANGES: Degenerative disc disease and facet and uncovertebral joint arthrosis predominate mid and lower cervical spine. No canal stenosis or significant neural foraminal narrowing is seen. SOFT TISSUES: Soft tissue structures surrounding the cervical spine appear unremarkable. Visualized portions of the upper hemithoraces, skullbase and posterior fossa contents appear unremarkable. Calcifications involving bilateral carotid vasculature reflect calcific atherosclerosis. Emphysematous changes are noted at the lung apices. No acute intracranial abnormality. Degenerative changes involving the cervical spine described above. No cervical fracture or acute traumatic subluxation. Calcifications involving bilateral carotid vasculature reflect calcific atherosclerosis. Emphysema. Note that if pain persists or worsens, or if clinically there is concern for CT occult acute cervical abnormality, flexion/extension C-spine series or MRI cervical spine may be considered for additional evaluation. Ct Cervical Spine Wo Contrast    Result Date: 7/14/2018  EXAMINATION: CT OF THE HEAD WITHOUT CONTRAST; CT OF THE CERVICAL SPINE WITHOUT CONTRAST 7/14/2018 8:27 am TECHNIQUE: CT of the head was performed without the administration of intravenous contrast. Dose modulation, iterative reconstruction, and/or weight based adjustment of the mA/kV was utilized to reduce the radiation dose to as low as reasonably achievable. ; intracranial abnormality. Degenerative changes involving the cervical spine described above. No cervical fracture or acute traumatic subluxation. Calcifications involving bilateral carotid vasculature reflect calcific atherosclerosis. Emphysema. Note that if pain persists or worsens, or if clinically there is concern for CT occult acute cervical abnormality, flexion/extension C-spine series or MRI cervical spine may be considered for additional evaluation. Ct Thoracic Spine Wo Contrast    Result Date: 7/14/2018  EXAMINATION: CT OF THE LUMBAR SPINE WITHOUT CONTRAST; CT OF THE THORACIC SPINE WITHOUT CONTRAST 7/14/2018 TECHNIQUE: CT of the lumbar spine was performed without the administration of intravenous contrast. Multiplanar reformatted images are provided for review. Dose modulation, iterative reconstruction, and/or weight based adjustment of the mA/kV was utilized to reduce the radiation dose to as low as reasonably achievable.; CT of the thoracic spine was performed without the administration of intravenous contrast. Multiplanar reformatted images are provided for review. Dose modulation, iterative reconstruction, and/or weight based adjustment of the mA/kV was utilized to reduce the radiation dose to as low as reasonably achievable. COMPARISON: None. HISTORY: ORDERING SYSTEM PROVIDED HISTORY: back pain TECHNOLOGIST PROVIDED HISTORY: Reason for exam:->back pain; ORDERING SYSTEM PROVIDED HISTORY: back pain s/p fall FINDINGS: BONES/ALIGNMENT:  Nondisplaced fractures left L3 and L5 transverse processes. No acute compression fracture of the thoracic or lumbar spine. There is normal alignment of the spine. Bone mineralization is abnormally low. Fractures are noted bilaterally through the 1st and 2nd sacral alae. DEGENERATIVE CHANGES: No significant degenerative changes of the thoracic or lumbar spine. Bilateral SI joint osteoarthritis. SOFT TISSUES: No paraspinal mass is seen. Changes of emphysema.   Prominent fractures. No lumbar compression fracture is evident. No evidence of acute traumatic injury of the thoracic spine. Bones are osteoporotic. Ct Pelvis Wo Contrast Additional Contrast? None    Result Date: 7/14/2018  EXAMINATION: CT OF THE PELVIS WITHOUT CONTRAST 7/14/2018 7:37 am TECHNIQUE: CT of the pelvis was performed without the administration of intravenous contrast.  Multiplanar reformatted images are provided for review. Dose modulation, iterative reconstruction, and/or weight based adjustment of the mA/kV was utilized to reduce the radiation dose to as low as reasonably achievable. COMPARISON: CT abdomen and pelvis from 06/05/2017 HISTORY: ORDERING SYSTEM PROVIDED HISTORY: pelvic, right hip pain s/p fall TECHNOLOGIST PROVIDED HISTORY: Additional Contrast?->None 80year-old male with right hip pain/pelvis after a fall FINDINGS: Both femoral heads are properly located within the bilateral acetabula. 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 There is subchondral fracturing and associated mild subchondral collapse of the superior aspect of the left femoral head with subjacent subcortical cystic changes. Moderate narrowing of the left hip joint space with associated mild marginal osteophyte spurring. Mild to moderate narrowing of the right hip joint space with associated mild osteophyte spurring. Diffuse osteopenia. Mild degenerative changes in the lower lumbar/lumbosacral spine. Atherosclerotic calcification of the abdominal aorta and iliac branch vasculature. There is also an acute fracture through the anterior aspect of the S2 level with disruption of the cortex on image 94, series 602. There is disruption of the cortex of the anterior sacral ala bilaterally best appreciated on image 30, series 2 likely representing underlying sacral insufficiency fractures. Nondisplaced fracture of the left L5 transverse process on image 11, series 2. Mild degenerative changes of the pubic symphysis. agree with the assessment, plan and orders as documented by the resident.     Electronically signed by Shelby Paez MD on 7/31/2018 at 9:04 PM

## 2018-07-31 NOTE — PROGRESS NOTES
Jimmy Harris DPM   AFLURIA PRESERVATIVE FREE 0.5 ML CHARLIE injection  10/10/16   Historical Provider, MD   .  Recent Surgical History: None = 0     Assessment   Received pt on RA, awake, no distress. Pt refused nighttime nebulizer treatment. Pt states he quit smoking when he was 79yrs old, has COPD.      RR 17  Breath Sounds: clear t/o      · Bronchodilator assessment at level  2  ·   ·   · [x]    Bronchodilator Assessment  BRONCHODILATOR ASSESSMENT SCORE  Score 0 1 2 3 4 5   Breath Sounds   []  Patient Baseline [x]  No Wheeze good aeration []  Faint, scattered wheezing, good aeration []  Expiratory Wheezing and or moderately diminished []  Insp/Exp wheeze and/or very diminished []  Insp/Exp and/ or marked distress   Respiratory Rate   []  Patient Baseline [x]  Less than 20 []  Less than 20 []  20-25 []  Greater than 25 []  Greater than 25   Peak flow % of Pred or PB [x]  NA   []  Greater than 90%  []  81-90% []  71-80% []  Less than or equal to 70%  or unable to perform []  Unable due to Respiratory Distress   Dyspnea re []  Patient Baseline []  No SOB [x]  No SOB []  SOB on exertion []  SOB min activity []  At rest/acute   e FEV% Predicted       [x]  NA []  Above 69%  []  Unable []  Above 60-69%  []  Unable []  Above 50-59%  []  Unable []  Above 35-49%  []  Unable []  Less than 35%  []  Unable

## 2018-08-01 VITALS
HEART RATE: 114 BPM | OXYGEN SATURATION: 99 % | TEMPERATURE: 98.6 F | BODY MASS INDEX: 21.14 KG/M2 | WEIGHT: 134.7 LBS | HEIGHT: 67 IN | RESPIRATION RATE: 15 BRPM | DIASTOLIC BLOOD PRESSURE: 52 MMHG | SYSTOLIC BLOOD PRESSURE: 113 MMHG

## 2018-08-01 LAB
ANION GAP SERPL CALCULATED.3IONS-SCNC: 12 MMOL/L (ref 9–17)
BUN BLDV-MCNC: 5 MG/DL (ref 8–23)
BUN/CREAT BLD: ABNORMAL (ref 9–20)
CALCIUM SERPL-MCNC: 7.9 MG/DL (ref 8.6–10.4)
CHLORIDE BLD-SCNC: 98 MMOL/L (ref 98–107)
CO2: 25 MMOL/L (ref 20–31)
CREAT SERPL-MCNC: 0.54 MG/DL (ref 0.7–1.2)
GFR AFRICAN AMERICAN: >60 ML/MIN
GFR NON-AFRICAN AMERICAN: >60 ML/MIN
GFR SERPL CREATININE-BSD FRML MDRD: ABNORMAL ML/MIN/{1.73_M2}
GFR SERPL CREATININE-BSD FRML MDRD: ABNORMAL ML/MIN/{1.73_M2}
GLUCOSE BLD-MCNC: 93 MG/DL (ref 70–99)
HCT VFR BLD CALC: 28.1 % (ref 40.7–50.3)
HEMOGLOBIN: 9.1 G/DL (ref 13–17)
MAGNESIUM: 1.6 MG/DL (ref 1.6–2.6)
POTASSIUM SERPL-SCNC: 3.5 MMOL/L (ref 3.7–5.3)
SODIUM BLD-SCNC: 135 MMOL/L (ref 135–144)

## 2018-08-01 PROCEDURE — 2580000003 HC RX 258: Performed by: INTERNAL MEDICINE

## 2018-08-01 PROCEDURE — 92526 ORAL FUNCTION THERAPY: CPT

## 2018-08-01 PROCEDURE — 99239 HOSP IP/OBS DSCHRG MGMT >30: CPT | Performed by: INTERNAL MEDICINE

## 2018-08-01 PROCEDURE — 80048 BASIC METABOLIC PNL TOTAL CA: CPT

## 2018-08-01 PROCEDURE — 97127 HC SP THER IVNTJ W/FOCUS COG FUNCJ: CPT

## 2018-08-01 PROCEDURE — 6370000000 HC RX 637 (ALT 250 FOR IP): Performed by: STUDENT IN AN ORGANIZED HEALTH CARE EDUCATION/TRAINING PROGRAM

## 2018-08-01 PROCEDURE — 2500000003 HC RX 250 WO HCPCS: Performed by: INTERNAL MEDICINE

## 2018-08-01 PROCEDURE — 6370000000 HC RX 637 (ALT 250 FOR IP): Performed by: INTERNAL MEDICINE

## 2018-08-01 PROCEDURE — 43235 EGD DIAGNOSTIC BRUSH WASH: CPT | Performed by: INTERNAL MEDICINE

## 2018-08-01 PROCEDURE — 94640 AIRWAY INHALATION TREATMENT: CPT

## 2018-08-01 PROCEDURE — 85018 HEMOGLOBIN: CPT

## 2018-08-01 PROCEDURE — 83735 ASSAY OF MAGNESIUM: CPT

## 2018-08-01 PROCEDURE — 85014 HEMATOCRIT: CPT

## 2018-08-01 PROCEDURE — 94762 N-INVAS EAR/PLS OXIMTRY CONT: CPT

## 2018-08-01 PROCEDURE — 6370000000 HC RX 637 (ALT 250 FOR IP): Performed by: NURSE PRACTITIONER

## 2018-08-01 RX ORDER — PANTOPRAZOLE SODIUM 40 MG/1
40 TABLET, DELAYED RELEASE ORAL 2 TIMES DAILY
Qty: 28 TABLET | Refills: 0 | Status: SHIPPED | OUTPATIENT
Start: 2018-08-01 | End: 2018-08-15

## 2018-08-01 RX ORDER — DONEPEZIL HYDROCHLORIDE 10 MG/1
10 TABLET, FILM COATED ORAL NIGHTLY
Qty: 30 TABLET | Refills: 3 | Status: SHIPPED | OUTPATIENT
Start: 2018-08-01

## 2018-08-01 RX ORDER — METOPROLOL SUCCINATE 25 MG/1
50 TABLET, EXTENDED RELEASE ORAL DAILY
Qty: 30 TABLET | Refills: 3 | Status: SHIPPED | OUTPATIENT
Start: 2018-08-02

## 2018-08-01 RX ORDER — LEVOFLOXACIN 500 MG/1
750 TABLET, FILM COATED ORAL DAILY
Qty: 8 TABLET | Refills: 0 | Status: SHIPPED | OUTPATIENT
Start: 2018-08-01 | End: 2021-01-29 | Stop reason: SDUPTHER

## 2018-08-01 RX ORDER — CLINDAMYCIN HYDROCHLORIDE 150 MG/1
300 CAPSULE ORAL 3 TIMES DAILY
Qty: 30 CAPSULE | Refills: 0 | Status: SHIPPED | OUTPATIENT
Start: 2018-08-01 | End: 2018-08-06

## 2018-08-01 RX ORDER — CLINDAMYCIN HYDROCHLORIDE 150 MG/1
300 CAPSULE ORAL EVERY 6 HOURS SCHEDULED
Status: DISCONTINUED | OUTPATIENT
Start: 2018-08-01 | End: 2018-08-01 | Stop reason: HOSPADM

## 2018-08-01 RX ORDER — LEVOFLOXACIN 750 MG/1
750 TABLET ORAL DAILY
Status: DISCONTINUED | OUTPATIENT
Start: 2018-08-02 | End: 2018-08-01 | Stop reason: HOSPADM

## 2018-08-01 RX ADMIN — IPRATROPIUM BROMIDE AND ALBUTEROL SULFATE 1 AMPULE: .5; 3 SOLUTION RESPIRATORY (INHALATION) at 14:44

## 2018-08-01 RX ADMIN — PANTOPRAZOLE SODIUM 40 MG: 40 TABLET, DELAYED RELEASE ORAL at 09:17

## 2018-08-01 RX ADMIN — CLINDAMYCIN HYDROCHLORIDE 300 MG: 150 CAPSULE ORAL at 16:28

## 2018-08-01 RX ADMIN — HYDROCODONE BITARTRATE AND ACETAMINOPHEN 1 TABLET: 5; 325 TABLET ORAL at 16:27

## 2018-08-01 RX ADMIN — MOMETASONE FUROATE AND FORMOTEROL FUMARATE DIHYDRATE 2 PUFF: 200; 5 AEROSOL RESPIRATORY (INHALATION) at 08:22

## 2018-08-01 RX ADMIN — METOPROLOL SUCCINATE 25 MG: 25 TABLET, EXTENDED RELEASE ORAL at 09:17

## 2018-08-01 RX ADMIN — Medication 600 MG: at 09:17

## 2018-08-01 RX ADMIN — TAMSULOSIN HYDROCHLORIDE 0.4 MG: 0.4 CAPSULE ORAL at 09:17

## 2018-08-01 RX ADMIN — HYDROCODONE BITARTRATE AND ACETAMINOPHEN 1 TABLET: 5; 325 TABLET ORAL at 09:16

## 2018-08-01 RX ADMIN — CLINDAMYCIN PHOSPHATE 600 MG: 600 INJECTION, SOLUTION INTRAVENOUS at 05:49

## 2018-08-01 RX ADMIN — IPRATROPIUM BROMIDE AND ALBUTEROL SULFATE 1 AMPULE: .5; 3 SOLUTION RESPIRATORY (INHALATION) at 08:22

## 2018-08-01 RX ADMIN — ERGOCALCIFEROL 50000 UNITS: 1.25 CAPSULE ORAL at 09:17

## 2018-08-01 RX ADMIN — Medication 10 ML: at 09:18

## 2018-08-01 ASSESSMENT — PAIN SCALES - GENERAL
PAINLEVEL_OUTOF10: 6

## 2018-08-01 NOTE — PROGRESS NOTES
[address] [facility].    PROGRESS NOTES            Date:   8/1/2018  Patient name:  Blake Howard  Date of admission:  7/25/2018  2:36 PM  MRN:   [id number]  YOB: 1931    CHIEF COMPLAINT     History Obtained From:  Patient and chart review. HISTORY OF PRESENT ILLNESS      The patient is a 80 y.o.  male who is admitted to the hospital for altered mental status, patient was seen here last admission for syncope and fall. At that time he had b/l sacral fractures and lumbar fracture which was treated conservatively by orthopedics. Has baseline dementia donepezil was discontinued in view of confusion and fall. All pain and sleep medications were minimized. And sent to SNF. This admission CXR shows Rt sided infiltrate denies fever or chills or any complaints at this time. Concern for aspiration. Passed bedside swallow evaluation, awaiting speech evaluation. Started on Levaquin and clindamycin. Ortho consulted per family request.  PMH of AD, Afib,  was on Xeralto and HTN, Vit D deficiency. Current evaluation 08/01/18  Afebrile overnight  Hemodynamically stable  BC remained negative , On clinda and levo for aspiration pneumonia day#7 today   Pt did not have  large melena/dark stools over night, Hb remained stable today 9.1  On Protonix gtt 48 hr so far , clear liquid diet by GI   Pt reporting diplopia, opthalmology ruled out acute pathology, recommend OP follow up with his ophthalmologist on discharge . PAST MEDICAL HISTORY       has a past medical history of Atrial fibrillation ([facility] [address] Cancer [facility]); COPD (chronic obstructive pulmonary disease) ([facility] [address] Hernia, inguinal, bilateral; and Hypertension. PAST SURGICAL HISTORY       has a past surgical history that includes Small intestine surgery; vascular surgery (Bilateral, 05/09/2014); Cataract removal; Colonoscopy; Colonoscopy (5/12/15);  Appendectomy; Cardiac surgery (Right, 5-29-15); Upper gastrointestinal endoscopy (N/A, 7/28/2018); and Upper gastrointestinal endoscopy (07/30/2018). HOME MEDICATIONS        Prior to Admission medications    Medication Sig Start Date End Date Taking? Authorizing Provider   zolpidem (AMBIEN) 10 MG tablet Take 10 mg by mouth nightly as needed for Sleep. .   Yes Historical Provider, MD   calcium carbonate 1500 (600 Ca) MG TABS tablet Take 1 tablet by mouth daily  Patient taking differently: Take 600 mg by mouth daily Pt does not take 7/19/18  Yes Francoise Gaona MD   vitamin D (ERGOCALCIFEROL) 77482 units CAPS capsule Take 1 capsule by mouth once a week for 11 doses 7/18/18 9/27/18 Yes Francoise Gaona MD   COMBIVENT RESPIMAT  MCG/ACT AERS inhaler INHALE 1 PUFF 4 TIMES A DAY AS DIRECTED 12/26/17  Yes Chicho Kim MD   ipratropium (ATROVENT) 0.06 % nasal spray Pt does not take 11/9/16  Yes Historical Provider, MD   XARELTO 20 MG TABS tablet 20 mg daily (with breakfast)  11/9/16  Yes Historical Provider, MD   dextromethorphan-guaiFENesin (ROBITUSSIN-DM)  MG/5ML syrup Take 10 mLs by mouth every 6 hours as needed for Cough. 3/10/15  Yes Claudia Álvarez MD   budesonide-formoterol (SYMBICORT) 160-4.5 MCG/ACT AERO Inhale 2 puffs into the lungs 2 times daily Pt does not take   Yes Historical Provider, MD   tamsulosin (FLOMAX) 0.4 MG capsule Take 0.3 mg by mouth daily    Yes Historical Provider, MD   metoprolol (TOPROL-XL) 25 MG XL tablet Take 12.5 mg by mouth 2 times daily.    Yes Historical Provider, MD   rivastigmine (EXELON) 9.5 MG/24HR Place 1 patch onto the skin daily  Patient taking differently: Place 1 patch onto the skin daily Pt does not take 7/17/18   Camron Padilla MD   colchicine (COLCRYS) 0.6 MG tablet Take 1 tablet by mouth 2 times daily  Patient taking differently: Take 0.6 mg by mouth 2 times daily Pt does not take 4/30/18   Robson Pamela, DPM   AFLURIA PRESERVATIVE FREE 0.5 ML CHARLIE injection  10/10/16   Historical Provider, MD fractures are not as well visualized as on prior CT. Right and left hemipelvis appear intact articulating normally at the SI joints and pubic symphysis. Visualized portions of the proximal right and left femur appear unremarkable. Poor visualization of bilateral sacral fractures seen on CT pelvis. Correlate with CT pelvis. Xr Abdomen (kub) (single Ap View)    Result Date: 7/15/2018  EXAMINATION: SINGLE SUPINE XRAY VIEW(S) OF THE ABDOMEN 7/15/2018 8:15 pm COMPARISON: March 3, 2015 HISTORY: ORDERING SYSTEM PROVIDED HISTORY: pain while urination, UA negative for UTI, recent fall with non displaced sacral fractures TECHNOLOGIST PROVIDED HISTORY: Reason for exam:->pain while urination, UA negative for UTI, recent fall with non displaced sacral fractures FINDINGS: No abnormally dilated loops of stool are seen. Mild gaseous distention of colon. No abnormal calcifications. Advanced lumbar spine degenerative changes are seen. Moderate-severe left hip osteoarthritis. No abnormally dilated loops of small bowel. Mild gaseous distention of colon. Ct Head Wo Contrast    Result Date: 7/25/2018  EXAMINATION: CT OF THE HEAD WITHOUT CONTRAST  7/25/2018 5:31 pm TECHNIQUE: CT of the head was performed without the administration of intravenous contrast. Dose modulation, iterative reconstruction, and/or weight based adjustment of the mA/kV was utilized to reduce the radiation dose to as low as reasonably achievable. COMPARISON: 14 July 2018 HISTORY: ORDERING SYSTEM PROVIDED HISTORY: [facility] TECHNOLOGIST PROVIDED HISTORY: Has a \"code stroke\" or \"stroke alert\" been called? ->No FINDINGS: BRAIN/VENTRICLES: There is no acute intracranial hemorrhage, mass effect or midline shift. No abnormal extra-axial fluid collection. The gray-white differentiation is maintained without evidence of an acute infarct. There is no evidence of hydrocephalus. Vertebral and internal carotid arteries are heavily calcified.   Scattered hypodensity orbits demonstrate no acute abnormality. SINUSES: The visualized paranasal sinuses and mastoid air cells demonstrate no acute abnormality. SOFT TISSUES/SKULL: No acute abnormality of the visualized skull or soft tissues. Calcifications involving bilateral carotid vasculature reflect calcific atherosclerosis. CT CERVICAL SPINE FINDINGS: BONES/ALIGNMENT: 7 typical cervical vertebra present maintain normal height and alignment. DEGENERATIVE CHANGES: Degenerative disc disease and facet and uncovertebral joint arthrosis predominate mid and lower cervical spine. No canal stenosis or significant neural foraminal narrowing is seen. SOFT TISSUES: Soft tissue structures surrounding the cervical spine appear unremarkable. Visualized portions of the upper hemithoraces, skullbase and posterior fossa contents appear unremarkable. Calcifications involving bilateral carotid vasculature reflect calcific atherosclerosis. Emphysematous changes are noted at the lung apices. No acute intracranial abnormality. Degenerative changes involving the cervical spine described above. No cervical fracture or acute traumatic subluxation. Calcifications involving bilateral carotid vasculature reflect calcific atherosclerosis. Emphysema. Note that if pain persists or worsens, or if clinically there is concern for CT occult acute cervical abnormality, flexion/extension C-spine series or MRI cervical spine may be considered for additional evaluation.      Ct Cervical Spine Wo Contrast    Result Date: 7/14/2018  EXAMINATION: CT OF THE HEAD WITHOUT CONTRAST; CT OF THE CERVICAL SPINE WITHOUT CONTRAST 7/14/2018 8:27 am TECHNIQUE: CT of the head was performed without the administration of intravenous contrast. Dose modulation, iterative reconstruction, and/or weight based adjustment of the mA/kV was utilized to reduce the radiation dose to as low as reasonably achievable.; CT of the cervical spine was performed without the administration of intravenous contrast. Multiplanar reformatted images are provided for review. Dose modulation, iterative reconstruction, and/or weight based adjustment of the mA/kV was utilized to reduce the radiation dose to as low as reasonably achievable. COMPARISON: None. HISTORY: ORDERING SYSTEM PROVIDED HISTORY: fall, on xarelto Headache and neck pain following trauma CT HEAD FINDINGS: BRAIN/VENTRICLES: There is no acute intracranial hemorrhage, mass effect or midline shift. No abnormal extra-axial fluid collection. The gray-white differentiation is maintained without evidence of an acute infarct. There is prominence of the ventricles and sulci due to global parenchymal volume loss. There are nonspecific areas of hypoattenuation within the periventricular and subcortical white matter, which likely represent chronic microvascular ischemic change. ORBITS: The visualized portion of the orbits demonstrate no acute abnormality. SINUSES: The visualized paranasal sinuses and mastoid air cells demonstrate no acute abnormality. SOFT TISSUES/SKULL: No acute abnormality of the visualized skull or soft tissues. Calcifications involving bilateral carotid vasculature reflect calcific atherosclerosis. CT CERVICAL SPINE FINDINGS: BONES/ALIGNMENT: 7 typical cervical vertebra present maintain normal height and alignment. DEGENERATIVE CHANGES: Degenerative disc disease and facet and uncovertebral joint arthrosis predominate mid and lower cervical spine. No canal stenosis or significant neural foraminal narrowing is seen. SOFT TISSUES: Soft tissue structures surrounding the cervical spine appear unremarkable. Visualized portions of the upper hemithoraces, skullbase and posterior fossa contents appear unremarkable. Calcifications involving bilateral carotid vasculature reflect calcific atherosclerosis. Emphysematous changes are noted at the lung apices. No acute intracranial abnormality.  Degenerative changes involving the cervical spine described above. No cervical fracture or acute traumatic subluxation. Calcifications involving bilateral carotid vasculature reflect calcific atherosclerosis. Emphysema. Note that if pain persists or worsens, or if clinically there is concern for CT occult acute cervical abnormality, flexion/extension C-spine series or MRI cervical spine may be considered for additional evaluation. Ct Thoracic Spine Wo Contrast    Result Date: 7/14/2018  EXAMINATION: CT OF THE LUMBAR SPINE WITHOUT CONTRAST; CT OF THE THORACIC SPINE WITHOUT CONTRAST 7/14/2018 TECHNIQUE: CT of the lumbar spine was performed without the administration of intravenous contrast. Multiplanar reformatted images are provided for review. Dose modulation, iterative reconstruction, and/or weight based adjustment of the mA/kV was utilized to reduce the radiation dose to as low as reasonably achievable.; CT of the thoracic spine was performed without the administration of intravenous contrast. Multiplanar reformatted images are provided for review. Dose modulation, iterative reconstruction, and/or weight based adjustment of the mA/kV was utilized to reduce the radiation dose to as low as reasonably achievable. COMPARISON: None. HISTORY: ORDERING SYSTEM PROVIDED HISTORY: back pain TECHNOLOGIST PROVIDED HISTORY: Reason for exam:->back pain; ORDERING SYSTEM PROVIDED HISTORY: back pain s/p fall FINDINGS: BONES/ALIGNMENT:  Nondisplaced fractures left L3 and L5 transverse processes. No acute compression fracture of the thoracic or lumbar spine. There is normal alignment of the spine. Bone mineralization is abnormally low. Fractures are noted bilaterally through the 1st and 2nd sacral alae. DEGENERATIVE CHANGES: No significant degenerative changes of the thoracic or lumbar spine. Bilateral SI joint osteoarthritis. SOFT TISSUES: No paraspinal mass is seen. Changes of emphysema. Prominent bibasilar bronchiectasis. Calcific atherosclerotic disease aorta. Nondisplaced fractures left L3 and L5 transverse processes. Nondisplaced bilateral sacral fractures. No lumbar compression fracture is evident. No evidence of acute traumatic injury of the thoracic spine. Bones are osteoporotic. Ct Lumbar Spine Wo Contrast    Result Date: 7/14/2018  EXAMINATION: CT OF THE LUMBAR SPINE WITHOUT CONTRAST; CT OF THE THORACIC SPINE WITHOUT CONTRAST 7/14/2018 TECHNIQUE: CT of the lumbar spine was performed without the administration of intravenous contrast. Multiplanar reformatted images are provided for review. Dose modulation, iterative reconstruction, and/or weight based adjustment of the mA/kV was utilized to reduce the radiation dose to as low as reasonably achievable.; CT of the thoracic spine was performed without the administration of intravenous contrast. Multiplanar reformatted images are provided for review. Dose modulation, iterative reconstruction, and/or weight based adjustment of the mA/kV was utilized to reduce the radiation dose to as low as reasonably achievable. COMPARISON: None. HISTORY: ORDERING SYSTEM PROVIDED HISTORY: back pain TECHNOLOGIST PROVIDED HISTORY: Reason for exam:->back pain; ORDERING SYSTEM PROVIDED HISTORY: back pain s/p fall FINDINGS: BONES/ALIGNMENT:  Nondisplaced fractures left L3 and L5 transverse processes. No acute compression fracture of the thoracic or lumbar spine. There is normal alignment of the spine. Bone mineralization is abnormally low. Fractures are noted bilaterally through the 1st and 2nd sacral alae. DEGENERATIVE CHANGES: No significant degenerative changes of the thoracic or lumbar spine. Bilateral SI joint osteoarthritis. SOFT TISSUES: No paraspinal mass is seen. Changes of emphysema. Prominent bibasilar bronchiectasis. Calcific atherosclerotic disease aorta. Nondisplaced fractures left L3 and L5 transverse processes. Nondisplaced bilateral sacral fractures. No lumbar compression fracture is evident.  No evidence of fractures are better appreciated on prior CT study. If there is concern for acute or new fractures, a CT is recommended. ASSESSMENT     Principal Problem:    Community acquired pneumonia of right lower lobe of lung ([address]  Active Problems:    COPD (chronic obstructive pulmonary disease) (HCC)    Atrial fibrillation (HCC)    HLD (hyperlipidemia)    Altered mental state    Aspiration pneumonia (HCC)    Sacral fracture (HCC)    Metabolic encephalopathy    Vitamin D deficiency    Melena    Anticoagulated  Resolved Problems:    * No resolved hospital problems. *        PLAN      Aspiration PNA switch antibiotics to oral , clinda 300 mg TID and levofloxacin 750 mg once for 5 more days   Duodenal ulcers on EGD. Protonix 40 BID for 14 days  Sucralfate for 14 days   On Tums daily. Avoid NSAIDS. A-fib rate controlled on Toprol. Prn lopressor IV 5 mg. Xarelto discontinued as due to high risk of GI bleed   Diplopia. No acute pathology per opthalmology , follow up with ophthalmologist as OP . Vit D deficiency on replacement. B/L sacral fractures   Fall precautions, aspiration precautions. Alzheimer Dementia on Aricept. Continue Aricept, D/C Namenda  Seroquel 12.5. Dc on discharge per neuro. BPH on Flomax. COPD. On Dulera BID and Dulera TID. Albuterol prn. DVT Px EPC, no chemical prophylaxis   SW  PT OT   OK to discharge , waiting for SNF placement. ,         Drea Franco MD  PGY-2, Internal Medicine resident  [facility].    8/1/2018, 9:58 AM

## 2018-08-01 NOTE — CARE COORDINATION
Message from [facility] at [facility], they have precert for admission there. PS to dr to check on discharge.

## 2018-08-01 NOTE — PROGRESS NOTES
Speech Language Pathology  Speech Language Pathology  [address] [facility]    Dysphagia / Cognitive Treatment Note    Date: 8/1/2018  Patients Name: Ernie Velásquez  MRN: [id number]  Diagnosis:  Patient Active Problem List   Diagnosis Code    Giant cell arteritis ([address] M31.6    Syncope R55    COPD (chronic obstructive pulmonary disease) ([address] J44.9    Atrial fibrillation (HCC) I48.91    Acute bronchitis J20.9    COPD exacerbation ([address] J44.1    HLD (hyperlipidemia) E78.5    Anemia D64.9    Tubular adenoma of colon D12.6    Anemia, blood loss D50.0    Small bowel stricture K56.699    Right inguinal hernia K40.90    Diverticulitis of large intestine without perforation or abscess without bleeding K57.32    Left lower quadrant pain R10.32    Cellulitis L03.90    Syncope and collapse R55    Altered mental state R41.82    Community acquired pneumonia of right lower lobe of lung ([address] J18.1    Aspiration pneumonia ([address] J69.0    Sacral fracture ([address] Z72.65QN    Metabolic encephalopathy O24.13    Vitamin D deficiency E55.9    HCAP (healthcare-associated pneumonia) J18.9    Melena K92.1    Anticoagulated Z79.01       Pain: 0/10    Dysphagia Treatment  Treatment time: 1211-9725    Subjective: [x] Alert [x] Cooperative     [] Confused     [] Agitated    [] Lethargic    Objective/Assessment:    Dysphagia:   Pt seen for diet tolerance monitoring with breakfast tray. Pt's breakfast tray with all thin liquids. Pt's current diet is Dysphagia I Pureed with Pudding thick liquids. ST clarified with RN to update diet order to pudding thick liquids. ST thickened thin liquids on breakfast tray to pudding thick consistency before feeding. Pt with +cough before and after all PO intake. Pt with no overt s/s of aspiration with 20x pudding thick liquid trials by spoon (beef broth, cranberry juice).  Pt provided education re: safe swallow strategies, s/s of aspiration, and risks associated with aspiration. ST recommends repeat MBSS procedure (initial MBSS completed 7/26/18) to reassess current diet for potential diet upgrade. Results & recommendations reported to RN. Pt. Seen for O/M treatment program.  Pt. Completed O/M exercises 15x 2 sets with min verbal & visual cues. Pt. Completed roberto maneuver 8x 1 reps with min verbal & visual cues. Cognition:    Recall:  Memory & Mental Manipulation, 3 Units (Backwards Order): 100% independently  Memory & Mental Manipulation, 4 Units (Backwards Order): 33%, increased to 100% with repetition, and min-mod verbal cue    Organization:  Word Generation: 95%, increased to 100% with min verbal cue       Plan:  [x] Continue ST services    [] Discharge from ST:        Discharge recommendations: [] Inpatient Rehab   [x] Shaheen Vivas   [] Outpatient Therapy  [] Follow up at trauma clinic   [] Other:         Treatment completed by:  Jp Hernandez M.S. CF-SLP

## 2018-08-01 NOTE — PROGRESS NOTES
Attempted to call report to [facility]. No nurse available to take report at this time.

## 2018-08-01 NOTE — PROGRESS NOTES
follow up in the GI clinic in 2 weeks with Dr. Sadi Sepulveda will s/o at this time. Thank you for allowing me to participate in the care of your patient. Please feel free to contact me with any questions or concerns.      Emily Palmer, [address] [facility]  [phone]

## 2018-08-01 NOTE — PROGRESS NOTES
Physical Therapy  DATE: 2018    NAME: Romeo Chu  MRN: [id number]   : 10/26/1931    Patient not seen this date for Physical Therapy due to:  [] Blood transfusion in progress  [] Hemodialysis  []  Patient Declined  [] Spine Precautions   [] Strict Bedrest  [] Surgery/ Procedure  [] Testing      [x] Other  -  11:00am,Pt is with Speech Therapy. Will check on pt in the PM if time allows. [] PT being discontinued at this time. Patient independent. No further needs. [] PT being discontinued at this time as the patient has been transferred to palliative care. No further needs.     Justice Decker, PTA

## 2018-08-02 NOTE — OP NOTE
DIGESTIVE [facility]ALTH ENDOSCOPY     PROCEDURE DATE: 7/30/18    REFERRING PHYSICIAN: No ref. provider found     PRIMARY CARE PROVIDER: Chago Prajapati MD    ATTENDING PHYSICIAN: Shaggy Figueredo MD     HISTORY: Mr. Washington Dawkins is a 80 y.o. male who presents to the [place] [address] Endoscopy unit for upper endoscopy. The patient's clinical history is remarkable for melena. He is currently medically stable and appropriate for the planned procedure. PREOPERATIVE DIAGNOSIS: melena. PROCEDURES:   1) Transoral Upper Endoscopy. POSTOPERATIVE DIAGNOSIS:   Low risk duodenal ulcer     MEDICATIONS:   MAC per anesthesia    INSTRUMENT: Olympus GIF-H180 flexible Gastroscope. PREPARATION: The nature and character of the procedure as well as risks, benefits, and alternatives were discussed with the patient and informed consent was obtained. Complications were said to include, but were not limited to: medication allergy, medication reaction, cardiovascular and respiratory problems, bleeding, perforation, infection, and/or missed diagnosis. Following arrival in the endoscopy room, the patient was placed in the left lateral decubitus position and final time-out accomplished in the presence of the nursing staff. Baseline vital signs were obtained and reviewed, and IV sedation was subsequently initiated. FINDINGS:   Esophagus: The esophagus was inspected to the Z-line. The endoscopic exam showed no pathology. Stomach: The stomach was inspected in both forward and retroflex fashion and was appropriately distensible. The cardia, fundus, incisura, antrum and pylorus were identified via direct visualization. The endoscopic exam showed no pathology. Duodenum: The proximal small bowel was inspected through the bulb, sweep, and second portion of the duodenum. The endoscopic exam showed 1 cm duodenal ulcer. No high risk stigmata. Two hemoclips were noted on the ulcer. No blood.        RECOMMENDATIONS:   1) Transfer back to the floor for further management as per primary care team.    2) Continue PPI drip.         Trung Carmichael

## 2018-08-06 ENCOUNTER — HOSPITAL ENCOUNTER (OUTPATIENT)
Dept: GENERAL RADIOLOGY | Age: 83
Discharge: HOME | End: 2018-08-08
Payer: MEDICARE

## 2018-08-06 DIAGNOSIS — R13.12 DYSPHAGIA, OROPHARYNGEAL: ICD-10-CM

## 2018-08-06 PROCEDURE — 74230 X-RAY XM SWLNG FUNCJ C+: CPT

## 2018-08-06 PROCEDURE — G8996 SWALLOW CURRENT STATUS: HCPCS

## 2018-08-06 PROCEDURE — G8997 SWALLOW GOAL STATUS: HCPCS

## 2018-08-06 NOTE — PROCEDURES
folds, and is not ejected from airway    Esophageal Phase  Esophageal Screen: [facility]        Pain   Patient Currently in Pain: No       G-Code:  SLP G-Codes  Functional Limitations: Swallowing  Swallow Current Status (): At least 20 percent but less than 40 percent impaired, limited or restricted  Swallow Goal Status ():  At least 1 percent but less than 20 percent impaired, limited or restricted      Therapy Time:   Individual Concurrent Group Co-treatment   Time In 1015         Time Out 1030         Minutes [address] [place] [address], 8/6/2018, 1:02 PM

## 2018-08-08 NOTE — DISCHARGE SUMMARY
HISTORY: Reason for exam:->pain while urination, UA negative for UTI, recent fall with non displaced sacral fractures FINDINGS: No abnormally dilated loops of stool are seen. Mild gaseous distention of colon. No abnormal calcifications. Advanced lumbar spine degenerative changes are seen. Moderate-severe left hip osteoarthritis. No abnormally dilated loops of small bowel. Mild gaseous distention of colon. Ct Head Wo Contrast    Result Date: 7/25/2018  EXAMINATION: CT OF THE HEAD WITHOUT CONTRAST  7/25/2018 5:31 pm TECHNIQUE: CT of the head was performed without the administration of intravenous contrast. Dose modulation, iterative reconstruction, and/or weight based adjustment of the mA/kV was utilized to reduce the radiation dose to as low as reasonably achievable. COMPARISON: 14 July 2018 HISTORY: ORDERING SYSTEM PROVIDED HISTORY: AMS TECHNOLOGIST PROVIDED HISTORY: Has a \"code stroke\" or \"stroke alert\" been called? ->No FINDINGS: BRAIN/VENTRICLES: There is no acute intracranial hemorrhage, mass effect or midline shift. No abnormal extra-axial fluid collection. The gray-white differentiation is maintained without evidence of an acute infarct. There is no evidence of hydrocephalus. Vertebral and internal carotid arteries are heavily calcified. Scattered hypodensity is present in the white matter compatible with chronic white matter microvascular change. ORBITS: The visualized portion of the orbits demonstrate no acute abnormality. SINUSES: The visualized paranasal sinuses and mastoid air cells demonstrate no acute abnormality. SOFT TISSUES/SKULL:  No acute abnormality of the visualized skull or soft tissues. Estimated biologic radiation dose for this procedure:1241.39 mGy/cm2. No acute intracranial abnormality. Senescent changes including chronic microvascular change. Atherosclerotic disease of the major intracranial vessels.      Ct Head Wo Contrast    Result Date: 7/14/2018  EXAMINATION: CT OF THE tissue structures surrounding the cervical spine appear unremarkable. Visualized portions of the upper hemithoraces, skullbase and posterior fossa contents appear unremarkable. Calcifications involving bilateral carotid vasculature reflect calcific atherosclerosis. Emphysematous changes are noted at the lung apices. No acute intracranial abnormality. Degenerative changes involving the cervical spine described above. No cervical fracture or acute traumatic subluxation. Calcifications involving bilateral carotid vasculature reflect calcific atherosclerosis. Emphysema. Note that if pain persists or worsens, or if clinically there is concern for CT occult acute cervical abnormality, flexion/extension C-spine series or MRI cervical spine may be considered for additional evaluation. Ct Cervical Spine Wo Contrast    Result Date: 7/14/2018  EXAMINATION: CT OF THE HEAD WITHOUT CONTRAST; CT OF THE CERVICAL SPINE WITHOUT CONTRAST 7/14/2018 8:27 am TECHNIQUE: CT of the head was performed without the administration of intravenous contrast. Dose modulation, iterative reconstruction, and/or weight based adjustment of the mA/kV was utilized to reduce the radiation dose to as low as reasonably achievable.; CT of the cervical spine was performed without the administration of intravenous contrast. Multiplanar reformatted images are provided for review. Dose modulation, iterative reconstruction, and/or weight based adjustment of the mA/kV was utilized to reduce the radiation dose to as low as reasonably achievable. COMPARISON: None. HISTORY: ORDERING SYSTEM PROVIDED HISTORY: fall, on xarelto Headache and neck pain following trauma CT HEAD FINDINGS: BRAIN/VENTRICLES: There is no acute intracranial hemorrhage, mass effect or midline shift. No abnormal extra-axial fluid collection. The gray-white differentiation is maintained without evidence of an acute infarct.  There is prominence of the ventricles and sulci due to global parenchymal volume loss. There are nonspecific areas of hypoattenuation within the periventricular and subcortical white matter, which likely represent chronic microvascular ischemic change. ORBITS: The visualized portion of the orbits demonstrate no acute abnormality. SINUSES: The visualized paranasal sinuses and mastoid air cells demonstrate no acute abnormality. SOFT TISSUES/SKULL: No acute abnormality of the visualized skull or soft tissues. Calcifications involving bilateral carotid vasculature reflect calcific atherosclerosis. CT CERVICAL SPINE FINDINGS: BONES/ALIGNMENT: 7 typical cervical vertebra present maintain normal height and alignment. DEGENERATIVE CHANGES: Degenerative disc disease and facet and uncovertebral joint arthrosis predominate mid and lower cervical spine. No canal stenosis or significant neural foraminal narrowing is seen. SOFT TISSUES: Soft tissue structures surrounding the cervical spine appear unremarkable. Visualized portions of the upper hemithoraces, skullbase and posterior fossa contents appear unremarkable. Calcifications involving bilateral carotid vasculature reflect calcific atherosclerosis. Emphysematous changes are noted at the lung apices. No acute intracranial abnormality. Degenerative changes involving the cervical spine described above. No cervical fracture or acute traumatic subluxation. Calcifications involving bilateral carotid vasculature reflect calcific atherosclerosis. Emphysema. Note that if pain persists or worsens, or if clinically there is concern for CT occult acute cervical abnormality, flexion/extension C-spine series or MRI cervical spine may be considered for additional evaluation.      Ct Thoracic Spine Wo Contrast    Result Date: 7/14/2018  EXAMINATION: CT OF THE LUMBAR SPINE WITHOUT CONTRAST; CT OF THE THORACIC SPINE WITHOUT CONTRAST 7/14/2018 TECHNIQUE: CT of the lumbar spine was performed without the administration of intravenous contrast. Multiplanar reformatted images are provided for review. Dose modulation, iterative reconstruction, and/or weight based adjustment of the mA/kV was utilized to reduce the radiation dose to as low as reasonably achievable.; CT of the thoracic spine was performed without the administration of intravenous contrast. Multiplanar reformatted images are provided for review. Dose modulation, iterative reconstruction, and/or weight based adjustment of the mA/kV was utilized to reduce the radiation dose to as low as reasonably achievable. COMPARISON: None. HISTORY: ORDERING SYSTEM PROVIDED HISTORY: back pain TECHNOLOGIST PROVIDED HISTORY: Reason for exam:->back pain; ORDERING SYSTEM PROVIDED HISTORY: back pain s/p fall FINDINGS: BONES/ALIGNMENT:  Nondisplaced fractures left L3 and L5 transverse processes. No acute compression fracture of the thoracic or lumbar spine. There is normal alignment of the spine. Bone mineralization is abnormally low. Fractures are noted bilaterally through the 1st and 2nd sacral alae. DEGENERATIVE CHANGES: No significant degenerative changes of the thoracic or lumbar spine. Bilateral SI joint osteoarthritis. SOFT TISSUES: No paraspinal mass is seen. Changes of emphysema. Prominent bibasilar bronchiectasis. Calcific atherosclerotic disease aorta. Nondisplaced fractures left L3 and L5 transverse processes. Nondisplaced bilateral sacral fractures. No lumbar compression fracture is evident. No evidence of acute traumatic injury of the thoracic spine. Bones are osteoporotic. Ct Lumbar Spine Wo Contrast    Result Date: 7/14/2018  EXAMINATION: CT OF THE LUMBAR SPINE WITHOUT CONTRAST; CT OF THE THORACIC SPINE WITHOUT CONTRAST 7/14/2018 TECHNIQUE: CT of the lumbar spine was performed without the administration of intravenous contrast. Multiplanar reformatted images are provided for review.  Dose modulation, iterative reconstruction, and/or weight based adjustment of the mA/kV was utilized to TECHNOLOGIST PROVIDED HISTORY: Additional Contrast?->None 80year-old male with right hip pain/pelvis after a fall FINDINGS: Both femoral heads are properly located within the bilateral acetabula. 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 There is subchondral fracturing and associated mild subchondral collapse of the superior aspect of the left femoral head with subjacent subcortical cystic changes. Moderate narrowing of the left hip joint space with associated mild marginal osteophyte spurring. Mild to moderate narrowing of the right hip joint space with associated mild osteophyte spurring. Diffuse osteopenia. Mild degenerative changes in the lower lumbar/lumbosacral spine. Atherosclerotic calcification of the abdominal aorta and iliac branch vasculature. There is also an acute fracture through the anterior aspect of the S2 level with disruption of the cortex on image 94, series 602. There is disruption of the cortex of the anterior sacral ala bilaterally best appreciated on image 30, series 2 likely representing underlying sacral insufficiency fractures. Nondisplaced fracture of the left L5 transverse process on image 11, series 2. Mild degenerative changes of the pubic symphysis. Moderate stool burden. Moderate rectosigmoid and sigmoid colonic diverticulosis. Enlarged prostate with associated calcifications. Correlate with PSA. 1. Acute fracture through the anterior cortex of S2 and bilateral sacral ala and/or sacral insufficiency fractures with disruption of the anterior cortex of the bilateral sacral ala. Underlying diffuse osteopenia. 2. Bilateral femoral head AVN. Subchondral fracturing of the left femoral head and subchondral collapse with moderate left hip osteoarthrosis. Mild right hip osteoarthrosis. 3. Nondisplaced left L5 transverse process fracture. 4. Enlarged prostate. Correlate with PSA. 5. Moderate rectosigmoid and sigmoid diverticulosis. Moderate stool burden.  6. Mild degenerative changes in the lower lumbar/lumbosacral spine. 7. Atherosclerotic calcification of the vasculature. Xr Chest Portable    Result Date: 7/27/2018  EXAMINATION: SINGLE XRAY VIEW OF THE CHEST 7/27/2018 8:23 am COMPARISON: 07/26/2018 HISTORY: ORDERING SYSTEM PROVIDED HISTORY: infiltrates TECHNOLOGIST PROVIDED HISTORY: Reason for exam:->infiltrates FINDINGS: Improving airspace opacification in the left lung base. Right lung is clear. No pleural effusion or pneumothorax. Heart size and mediastinal contours are stable. The pulmonary vascularity is normal.     Improving left basilar patchy opacification may represent improving pneumonia or atelectasis. Xr Chest Portable    Result Date: 7/26/2018  EXAMINATION: SINGLE XRAY VIEW OF THE CHEST 7/26/2018 7:13 am COMPARISON: 25 July 2018 HISTORY: ORDERING SYSTEM PROVIDED HISTORY: compare to prior TECHNOLOGIST PROVIDED HISTORY: Reason for exam:->compare to prior FINDINGS: AP portable view of the chest time stamped at 649 hours demonstrates overlying cardiac monitoring electrodes. Heart size is normal.  Prior opacity at the right base is less prominent than on prior study, likely atelectasis. Strand-like opacities are present at the left base, worsened since prior study, either atelectasis or airspace disease. No gross vascular congestion, effusion, or pneumothorax is noted. Osseous and mediastinal structures are stable in age-appropriate peer     1. Decreased opacity at the right lung base, likely compatible with resolving atelectasis. 2.  Worsening opacity at the left lung base, either atelectasis or airspace disease. RECOMMENDATION: PA and lateral views of the chest may prove helpful for best assessment.      Xr Chest Portable    Result Date: 7/25/2018  EXAMINATION: SINGLE XRAY VIEW OF THE CHEST 7/25/2018 4:03 pm COMPARISON: July 14, 2018 HISTORY: ORDERING SYSTEM PROVIDED HISTORY: chest pain TECHNOLOGIST PROVIDED HISTORY: Reason for exam:->chest pain fractures, a CT is recommended. Fl Modified Barium Swallow W Video    Result Date: 8/6/2018  EXAMINATION: MODIFIED BARIUM SWALLOW WAS PERFORMED IN CONJUNCTION WITH SPEECH PATHOLOGY SERVICES TECHNIQUE: Fluoroscopic evaluation of the swallowing mechanism was performed with multiple consistency of barium product. FLUOROSCOPY DOSE AND TYPE OR TIME AND EXPOSURES: 1.6 minutes 1.131 Gycm2 COMPARISON: None HISTORY: ORDERING SYSTEM PROVIDED HISTORY: Dysphagia, oropharyngeal TECHNOLOGIST PROVIDED HISTORY: Reason for exam:->Dysphagia FINDINGS: No aspiration or penetration with puree or solid consistencies. Penetration was seen with both thin and thick liquid consistencies. No definite aspiration. No aspiration. Penetration with liquid consistencies. Please see separate speech pathology report for full discussion of findings and recommendations. Fl Modified Barium Swallow W Video    Result Date: 7/26/2018  EXAMINATION: MODIFIED BARIUM SWALLOW WAS PERFORMED IN CONJUNCTION WITH SPEECH PATHOLOGY SERVICES TECHNIQUE: Fluoroscopic evaluation of the swallowing mechanism was performed with multiple consistency of barium product. FLUOROSCOPY DOSE AND TYPE OR TIME AND EXPOSURES: Fluoroscopic time 1.2 minutes. 1 spot film. COMPARISON: None HISTORY: ORDERING SYSTEM PROVIDED HISTORY: aspiration pna TECHNOLOGIST PROVIDED HISTORY: Reason for exam:->aspiration pna FINDINGS: Thick liquid:  Deep laryngeal penetration with consequent aspiration of some residual. Thin liquid:  Aspiration with prompt cough response. Puree:  No laryngeal penetration or aspiration. Soft solid and cookie:  Not tested. There is aspiration of thick and thin liquid. Please see separate speech pathology report for full discussion of findings and recommendations.          Consultations:    Consults:     Final Specialist Recommendations/Findings:   IP CONSULT TO INTERNAL MEDICINE  IP CONSULT TO SOCIAL WORK  IP CONSULT TO ORTHOPEDIC SURGERY  IP CONSULT TO albuterol-ipratropium  INHALE 1 PUFF 4 TIMES A DAY AS DIRECTED     dextromethorphan-guaiFENesin  MG/5ML syrup  Commonly known as:  ROBITUSSIN-DM  Take 10 mLs by mouth every 6 hours as needed for Cough. ipratropium 0.06 % nasal spray  Commonly known as:  ATROVENT     SYMBICORT 160-4.5 MCG/ACT Aero  Generic drug:  budesonide-formoterol     tamsulosin 0.4 MG capsule  Commonly known as:  FLOMAX     vitamin D 42698 units Caps capsule  Commonly known as:  ERGOCALCIFEROL  Take 1 capsule by mouth once a week for 11 doses     zolpidem 10 MG tablet  Commonly known as:  AMBIEN        STOP taking these medications    rivastigmine 9.5 MG/24HR  Commonly known as:  EXELON     XARELTO 20 MG Tabs tablet  Generic drug:  rivaroxaban        ASK your doctor about these medications    clindamycin 150 MG capsule  Commonly known as:  CLEOCIN  Take 2 capsules by mouth 3 times daily for 5 days  Ask about: Should I take this medication?     levofloxacin 500 MG tablet  Commonly known as:  LEVAQUIN  Take 1.5 tablets by mouth daily for 5 days  Ask about: Should I take this medication? Where to Get Your Medications      These medications were sent to [facility] [address]  [address], [address]  [address]    Phone:  [phone]   · clindamycin 150 MG capsule  · donepezil 10 MG tablet  · levofloxacin 500 MG tablet  · metoprolol succinate 25 MG extended release tablet  · pantoprazole 40 MG tablet         Time Spent on discharge is  35 mins in patient examination, evaluation, counseling as well as medication reconciliation, prescriptions for required medications, discharge plan and follow up. Electronically signed by   Leonel Hamlin MD  8/8/2018  1:36 PM      Thank you Dr. Mary Ann Blanco MD for the opportunity to be involved in this patient's care.

## 2018-08-30 ENCOUNTER — OFFICE VISIT (OUTPATIENT)
Dept: NEUROLOGY | Age: 83
End: 2018-08-30
Payer: MEDICARE

## 2018-08-30 VITALS
DIASTOLIC BLOOD PRESSURE: 83 MMHG | HEART RATE: 91 BPM | HEIGHT: 67 IN | SYSTOLIC BLOOD PRESSURE: 138 MMHG | BODY MASS INDEX: 19.46 KG/M2 | WEIGHT: 124 LBS

## 2018-08-30 DIAGNOSIS — F03.90 DEMENTIA WITHOUT BEHAVIORAL DISTURBANCE, UNSPECIFIED DEMENTIA TYPE: Primary | ICD-10-CM

## 2018-08-30 PROCEDURE — 4040F PNEUMOC VAC/ADMIN/RCVD: CPT | Performed by: STUDENT IN AN ORGANIZED HEALTH CARE EDUCATION/TRAINING PROGRAM

## 2018-08-30 PROCEDURE — G8427 DOCREV CUR MEDS BY ELIG CLIN: HCPCS | Performed by: STUDENT IN AN ORGANIZED HEALTH CARE EDUCATION/TRAINING PROGRAM

## 2018-08-30 PROCEDURE — G8420 CALC BMI NORM PARAMETERS: HCPCS | Performed by: STUDENT IN AN ORGANIZED HEALTH CARE EDUCATION/TRAINING PROGRAM

## 2018-08-30 PROCEDURE — 1101F PT FALLS ASSESS-DOCD LE1/YR: CPT | Performed by: STUDENT IN AN ORGANIZED HEALTH CARE EDUCATION/TRAINING PROGRAM

## 2018-08-30 PROCEDURE — 1036F TOBACCO NON-USER: CPT | Performed by: STUDENT IN AN ORGANIZED HEALTH CARE EDUCATION/TRAINING PROGRAM

## 2018-08-30 PROCEDURE — 1123F ACP DISCUSS/DSCN MKR DOCD: CPT | Performed by: STUDENT IN AN ORGANIZED HEALTH CARE EDUCATION/TRAINING PROGRAM

## 2018-08-30 PROCEDURE — 1111F DSCHRG MED/CURRENT MED MERGE: CPT | Performed by: STUDENT IN AN ORGANIZED HEALTH CARE EDUCATION/TRAINING PROGRAM

## 2018-08-30 PROCEDURE — 99214 OFFICE O/P EST MOD 30 MIN: CPT | Performed by: STUDENT IN AN ORGANIZED HEALTH CARE EDUCATION/TRAINING PROGRAM

## 2018-08-30 ASSESSMENT — ENCOUNTER SYMPTOMS
GASTROINTESTINAL NEGATIVE: 1
EYES NEGATIVE: 1
BACK PAIN: 1
COUGH: 1

## 2018-08-30 NOTE — PROGRESS NOTES
[address] [facility]  [id number] [id number]  Dept: [phone]  Dept Fax: [phone]    NEUROLOGY FOLLOW UP NOTE                                              PATIENT NAME: Yan Acosta   PATIENT MRN: [id number]  FOLLOW UP TODAY: 8/30/2018        INITIAL & INTERVAL HISTORY:     Yan Acosta is a RH  80 y.o. male here for follow up after being d/c on 8/1/18. He was admitted for AMS,s/p fall from SNF. Patient had  pnuemonia on admission was treated with clindamycin and report some double vision and hallucination. An eeg was done which showed mild slowing in theta frequency compatible with mild encephalopathy. PAtient was also given referral to see opthalmologist but hasnt seen one yet. Today patient is seen in clinic denies any acute complains. States his mentation is improved but on/off he does have hallucination episiode. Denies any other complains or concerns on this visit. Min mental show impaired show term memory. PMH    has a past medical history of Atrial fibrillation ([facility] [address] Cancer [facility]); COPD (chronic obstructive pulmonary disease) ([facility] [address] Hernia, inguinal, bilateral; and Hypertension. PSH/SH/FMH: Remain unchanged since last visit Visit date not found.     ALLERGIES:   Allergies   Allergen Reactions    Codeine Other (See Comments)     Upset stomach    Fentanyl      Aggitated, confused    Pcn [Penicillins] Other (See Comments)     Upset stomch       MEDICATIONS:   Current Outpatient Prescriptions   Medication Sig Dispense Refill    metoprolol succinate (TOPROL XL) 25 MG extended release tablet Take 2 tablets by mouth daily 30 tablet 3    donepezil (ARICEPT) 10 MG tablet Take 1 tablet by mouth nightly 30 tablet 3    calcium carbonate 1500 (600 Ca) MG TABS tablet Take 1 tablet by mouth daily (Patient taking differently: Take 600 mg by mouth daily Pt does not take) 60 tablet 3    vitamin D (ERGOCALCIFEROL) 59178 units Size: Large Adult)   Pulse 91   Ht 5' 7\" (1.702 m)   Wt 124 lb (56.2 kg)   BMI 19.42 kg/m²     PHYSICAL EXAMINATION:     Physical Exam     General appearance: cooperative  Skin: no rash or skin lesions. HEENT: normocephalic  Optic Fundi: deferred  Neck: supple, no cervcical adenopathy or carotid bruit  Lungs: clear to auscultation  Heart: Regular rate and rhythm, normal S1, S2. No murmurs, clicks or gallops. Peripheral pulses: radial pulses palpable  Abdominal: BS present, soft, NT, ND  Extremities: no edema    NEUROLOGICAL EXAMINATION:      MS: awake, alert and oriented. No aphasia, dysarthria, or neglect  CNs: PERRLA, EOMI, VF full, sensation intact, face symmetric, hearing intact, soft palate rises on phonation, sternocleidomastoid and trapezius intact. Tongue midline, no fasciculations. Motor: no abnormal movements, tone and bulk okay. RUE: delta 5/5, biceps 5/5, triceps 5/5,  5/5  LUE: delta 5/5, biceps 5/5, triceps 5/5,  5/5  RLE: hf 5/5, ke 5/5, df 5/5, pf 5/5  LLE: hf 5/5, ke 5/5, df 5/5, pf 5/5  Reflexes: 2+ throughout, symmetric, babinski not present. Coordination: FNF no dysmetria, heel to shin okay, BAISA okay,  Gait: Defeered  Sensory: normal to light touch/temp/pp/vibration, intact joint position sense, no extinction. ASSESSMENT / PLAN:    80 y [de-identified]  RH M here for follow-up after being discharged from hospital on 8/1/18 for AMS. -Patient to get B12 and tsh level tested. -Patient to watch for worsening hallucination and contact if its worsens  -Patient to follow-up in 5-6 months      Mr. Alexandra Brown received counseling on the following healthy behaviors: medical compliance, smoking cessation, blood pressure control, regular follow up with primary doctor.         Electronically signed by Margie Ballesteros MD on 8/30/2018 at 4:33 PM

## 2018-08-30 NOTE — PATIENT INSTRUCTIONS
-Patient to get B12 and tsh level tested.   -Patient to watch for worsening hallucination and contact if its worsens  -Patient to follow-up in 5-6 months

## 2018-09-18 ENCOUNTER — NURSE ONLY (OUTPATIENT)
Dept: FAMILY MEDICINE CLINIC | Age: 83
End: 2018-09-18

## 2018-09-18 DIAGNOSIS — J18.9 PNEUMONIA DUE TO INFECTIOUS ORGANISM, UNSPECIFIED LATERALITY, UNSPECIFIED PART OF LUNG: Primary | ICD-10-CM

## 2018-09-25 NOTE — PROGRESS NOTES
I have discussed the case of Angel Saucedo, including pertinent history and exam findings with the resident. I have seen and examined the patient and the key elements of the encounter have been performed by me. I agree with the assessment, plan and orders as documented by the resident with changes made to the note as needed. The patient has history of dementia and was admitted in August 2018 with delirium superimposed on dementia. The delirium was attributed to pneumonia for which he was treated with clindamycin. Neurological workup including EEG showed mild slowing. He is a resident of skilled nursing facility. I agree with obtaining vitamin B12 level and thyroid profile. Continued Aricept 10 mg by mouth daily at bedtime. Follow-up in next 5-6 months.       Elda Bird MD  Neurology

## 2018-10-02 RX ORDER — BUDESONIDE AND FORMOTEROL FUMARATE DIHYDRATE 160; 4.5 UG/1; UG/1
AEROSOL RESPIRATORY (INHALATION)
Qty: 1 INHALER | Refills: 11 | Status: SHIPPED | OUTPATIENT
Start: 2018-10-02

## 2019-05-17 ENCOUNTER — OFFICE VISIT (OUTPATIENT)
Dept: PULMONOLOGY | Age: 84
End: 2019-05-17
Payer: MEDICARE

## 2019-05-17 VITALS
RESPIRATION RATE: 12 BRPM | HEIGHT: 67 IN | DIASTOLIC BLOOD PRESSURE: 76 MMHG | BODY MASS INDEX: 20.72 KG/M2 | SYSTOLIC BLOOD PRESSURE: 125 MMHG | HEART RATE: 73 BPM | WEIGHT: 132 LBS | OXYGEN SATURATION: 99 %

## 2019-05-17 DIAGNOSIS — Z79.01 ANTICOAGULATED: ICD-10-CM

## 2019-05-17 DIAGNOSIS — D02.0 CA IN SITU LARYNX: ICD-10-CM

## 2019-05-17 DIAGNOSIS — I48.20 CHRONIC ATRIAL FIBRILLATION (HCC): ICD-10-CM

## 2019-05-17 DIAGNOSIS — J42 CHRONIC BRONCHITIS, UNSPECIFIED CHRONIC BRONCHITIS TYPE (HCC): Primary | ICD-10-CM

## 2019-05-17 PROCEDURE — 3023F SPIROM DOC REV: CPT | Performed by: INTERNAL MEDICINE

## 2019-05-17 PROCEDURE — 4040F PNEUMOC VAC/ADMIN/RCVD: CPT | Performed by: INTERNAL MEDICINE

## 2019-05-17 PROCEDURE — 1036F TOBACCO NON-USER: CPT | Performed by: INTERNAL MEDICINE

## 2019-05-17 PROCEDURE — G8926 SPIRO NO PERF OR DOC: HCPCS | Performed by: INTERNAL MEDICINE

## 2019-05-17 PROCEDURE — G8427 DOCREV CUR MEDS BY ELIG CLIN: HCPCS | Performed by: INTERNAL MEDICINE

## 2019-05-17 PROCEDURE — 1123F ACP DISCUSS/DSCN MKR DOCD: CPT | Performed by: INTERNAL MEDICINE

## 2019-05-17 PROCEDURE — 99214 OFFICE O/P EST MOD 30 MIN: CPT | Performed by: INTERNAL MEDICINE

## 2019-05-17 PROCEDURE — G8420 CALC BMI NORM PARAMETERS: HCPCS | Performed by: INTERNAL MEDICINE

## 2019-05-17 NOTE — PROGRESS NOTES
Dwight Cerda  5/17/2019      Dwight Cerda is known to have chronic obstructive lung disease due to chronic bronchitis and emphysema which is being treated with bronchodilators with good results. He has no evidence of acute exacerbation of COPD is in no excessive sputum, no hemoptysis. No chest pain. No excessive cough and wheezing. No other no nasal stuffiness or sinusitis. He had lost some weight since her last visit. His dyspnea has not gotten any worse. He has not noted any pedal edema or any thromboembolic process. He is known to have chronic atrial fibrillation which is rate controlled. He is on anticoagulation to prevent any thromboembolic process. Phenomena. Patient is known to have carcinoma of the larynx. There've been no recurrence of the laryngeal cancer since the radiation. He has not noted any pulmonary aspiration. Denies any bone pains. Denies any chest pain. Immunization   Immunization History   Administered Date(s) Administered    Pneumococcal Polysaccharide ([id number]) 03/01/2015        Pneumococcal Vaccine     [x] Up to date    [] Indicated   [] Refused  [] Contraindicated       Influenza Vaccine   [x] Up to date    [] Indicated   [] Refused  [] Contraindicated       No flowsheet data found. 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 No additional abnormality was detected. Review of Systems -  General ROS: negative for - chills, fatigue, fever or weight loss  ENT ROS: negative for - headaches, oral lesions or sore throat  Cardiovascular ROS: no chest pain , orthopnea or pnd   Gastrointestinal ROS: no abdominal pain, change in bowel habits, or black or bloody stools  Skin - no rash   Neuro - no blurry vision , no loc .  No focal weakness   msk - no jt tenderness or swelling    Vascular - no claudication , rest completed and negative   Lymphatic - complete and negative   Hematology - oncology - complete and negative   Allergy immunology - complete and negative    no burning or hematuria   Upper Extremities  Lower Extremities    Medications were reviewed  Current Outpatient Medications   Medication Sig Dispense Refill    albuterol-ipratropium (COMBIVENT RESPIMAT)  MCG/ACT AERS inhaler Inhale 1 puff into the lungs every 6 hours 1 Inhaler 11    SYMBICORT 160-4.5 MCG/ACT AERO INHALE 2 PUFFS 2 TIMES A DAY 1 Inhaler 11    metoprolol succinate (TOPROL XL) 25 MG extended release tablet Take 2 tablets by mouth daily 30 tablet 3    donepezil (ARICEPT) 10 MG tablet Take 1 tablet by mouth nightly 30 tablet 3    zolpidem (AMBIEN) 10 MG tablet Take 10 mg by mouth nightly as needed for Sleep. Mayte Adama calcium carbonate 1500 (600 Ca) MG TABS tablet Take 1 tablet by mouth daily (Patient taking differently: Take 600 mg by mouth daily Pt does not take) 60 tablet 3    colchicine (COLCRYS) 0.6 MG tablet Take 1 tablet by mouth 2 times daily (Patient taking differently: Take 0.6 mg by mouth 2 times daily Pt does not take) 30 tablet 1    COMBIVENT RESPIMAT  MCG/ACT AERS inhaler INHALE 1 PUFF 4 TIMES A DAY AS DIRECTED 4 g 11    AFLURIA PRESERVATIVE FREE 0.5 ML CHARLIE injection   0    ipratropium (ATROVENT) 0.06 % nasal spray Pt does not take  6    dextromethorphan-guaiFENesin (ROBITUSSIN-DM)  MG/5ML syrup Take 10 mLs by mouth every 6 hours as needed for Cough. 1 Bottle 1    tamsulosin (FLOMAX) 0.4 MG capsule Take 0.3 mg by mouth daily       pantoprazole (PROTONIX) 40 MG tablet Take 1 tablet by mouth 2 times daily for 14 days 28 tablet 0    vitamin D (ERGOCALCIFEROL) 58886 units CAPS capsule Take 1 capsule by mouth once a week for 11 doses 8 capsule 0     No current facility-administered medications for this visit.         Allergies   Allergen Reactions    Codeine Other (See Comments)     Upset stomach    Fentanyl      Aggitated, confused    Pcn [Penicillins] Other (See Comments)     Upset stomch Immunization History   Administered Date(s) Administered    Pneumococcal Polysaccharide ([id number]) 2015        PAST MEDICAL HISTORY:         Diagnosis Date    Atrial fibrillation [facility]) 13  - heart ablation of dysrhythmia    Cancer ([facility] [address]     vocal cords-radiation    COPD (chronic obstructive pulmonary disease) (HCC)     Emphysema of lung ([facility] [address]     Hernia, inguinal, bilateral     rt.     Hypertension        FAMILY HISTORY:       Problem Relation Age of Onset   [facility] Cancer Father         prostate    Cancer Sister         in abdomen    Cancer Brother         brain       SURGICAL HISTORY:   Past Surgical History:   Procedure Laterality Date    APPENDECTOMY      CARDIAC SURGERY Right 5-29-15    inguinal hernia with mesh    CATARACT REMOVAL      COLONOSCOPY      COLONOSCOPY  5/12/15    ulceration at the anastomosis with the small bowel-pathology acute and chronic inflammation, rectal polyp-tubular adenoma    SMALL INTESTINE SURGERY      UPPER GASTROINTESTINAL ENDOSCOPY N/A 2018    EGD CONTROL HEMORRHAGE performed by Alexander Kelley MD at [facility]  2018    UPPER GASTROINTESTINAL ENDOSCOPY N/A 2018    EGD ESOPHAGOGASTRODUODENOSCOPY performed by Alexander Kelley MD at [facility] 2014    Bilateral temporal artery biopsy     Social History     Tobacco Use   Smoking Status Former Smoker    Packs/day: 0.25    Years: 24.00    Pack years: 6.00    Start date: 1946    Last attempt to quit: 1970    Years since quittin.4   Smokeless Tobacco Never Used         PHYSICAL EXAM:   General Appearance:    Alert, cooperative, no distress, appears stated age, Elderly, not overweight     Head:    Normocephalic, without obvious abnormality, atraumatic      Neck:   Supple, symmetrical, trachea midline, no adenopathy;     thyroid:  no enlargement/tenderness/nodules; no carotid    bruit or JVD     Back: Symmetric, no curvature, ROM normal, no CVA tenderness     Lungs:     Clear to auscultation bilaterally, respirations unlabored no     dullness no bb, no wheezing no rales, vent all lobes,     diaphram motion normal Along expiration     Chest Wall:    No tenderness or deformity      Heart:    Regular rate and rhythm, S1 and S2 normal, no murmur, rub     or gallop no rvh, P2 normal.  Irregular rhythm due to atrial fibrillation                           Abdomen:                              Pulses:                                 Lymph nodes:                    Neurologic:                  Soft, non-tender, bowel sounds active all four quadrants,     no masses, no organomegaly     2+ and symmetric all extremities      Cervical, supraclavicular not enlarged or matted or tender      CNII-XII intact, normal strength 5/5 . Sensation grossly normal  and reflexes normal 2+ throughout        Extremities - Clubbing - no    Edema - no    DVT - no    Skin changes - no    Pulses normal       Musculoskeletal - no joint swelling or tenderness or synovitis        /76 (Site: Right Upper Arm)   Pulse 73   Resp 12   Ht 5' 7\" (1.702 m)   Wt 132 lb (59.9 kg)   SpO2 99% Comment: Room air at rest  BMI 20.67 kg/m²     Results for orders placed or performed during the hospital encounter of 07/25/18   CULTURE BLOOD #1   Result Value Ref Range    Specimen Description . BLOOD     Special Requests LT FA 10ML     Culture NO GROWTH 6 DAYS     Status FINAL 07/31/2018    CULTURE BLOOD #2   Result Value Ref Range    Specimen Description . BLOOD     Special Requests R FA 3ML     Culture NO GROWTH 6 DAYS     Status FINAL 07/31/2018    Legionella antigen, urine   Result Value Ref Range    Specimen Description . CLEAN CATCH URINE     Special Requests NOT REPORTED     Direct Exam       Urine negative for L. pneumophilia serogroup 1 antigen.   Infection due to    Direct Exam        Legionella cannot be ruled out since (1) other L. pneumophilia serogroups and    Direct Exam        other Legionella species may cause disease, (2) antigen may not be present in    Direct Exam        early infection (<3 days). and (3) the level of antigen present in the urine    Direct Exam  may be below the detectable levels of this test.     Status FINAL 07/26/2018    Strep Pneumoniae Antigen   Result Value Ref Range    Specimen Description . CLEAN CATCH URINE     Special Requests NOT REPORTED     Direct Exam NEGATIVE: Strep pneumoniae antigen not detected     Status FINAL 07/26/2018    Comprehensive Metabolic Panel   Result Value Ref Range    Glucose 109 (H) 70 - 99 mg/dL    BUN 25 (H) 8 - 23 mg/dL    CREATININE 0.62 (L) 0.70 - 1.20 mg/dL    Bun/Cre Ratio NOT REPORTED 9 - 20    Calcium 8.8 8.6 - 10.4 mg/dL    Sodium 134 (L) 135 - 144 mmol/L    Potassium 5.1 3.7 - 5.3 mmol/L    Chloride 96 (L) 98 - 107 mmol/L    CO2 23 20 - 31 mmol/L    Anion Gap 15 9 - 17 mmol/L    Alkaline Phosphatase 111 40 - 129 U/L    ALT 15 5 - 41 U/L    AST 30 <40 U/L    Total Bilirubin 0.71 0.3 - 1.2 mg/dL    Total Protein 7.2 6.4 - 8.3 g/dL    Alb 3.4 (L) 3.5 - 5.2 g/dL    Albumin/Globulin Ratio 0.9 (L) 1.0 - 2.5    GFR Non-African American >60 >60 mL/min    GFR African American >60 >60 mL/min    GFR Comment          GFR Staging NOT REPORTED    CBC   Result Value Ref Range    WBC 11.3 3.5 - 11.3 k/uL    RBC 4.65 4.21 - 5.77 m/uL    Hemoglobin 12.9 (L) 13.0 - 17.0 g/dL    Hematocrit 41.4 40.7 - 50.3 %    MCV 89.0 82.6 - 102.9 fL    MCH 27.7 25.2 - 33.5 pg    MCHC 31.2 28.4 - 34.8 g/dL    RDW 15.0 (H) 11.8 - 14.4 %    Platelets 560 793 - 286 k/uL    MPV 11.0 8.1 - 13.5 fL    NRBC Automated 0.0 0.0 per 100 WBC   Lactate, Sepsis   Result Value Ref Range    Lactic Acid, Sepsis NOT REPORTED 0.5 - 1.9 mmol/L    Lactic Acid, Sepsis, Whole Blood 1.5 0.5 - 1.9 mmol/L   PROTIME-INR   Result Value Ref Range    Protime 15.4 (H) 9.0 - 12.0 sec    INR 1.5    APTT   Result Value Ref Range    PTT 38.4 (H) 20.5 - 30.5 Panel w/ Reflex to MG   Result Value Ref Range    Glucose 88 70 - 99 mg/dL    BUN 18 8 - 23 mg/dL    CREATININE 0.62 (L) 0.70 - 1.20 mg/dL    Bun/Cre Ratio NOT REPORTED 9 - 20    Calcium 8.0 (L) 8.6 - 10.4 mg/dL    Sodium 134 (L) 135 - 144 mmol/L    Potassium 3.9 3.7 - 5.3 mmol/L    Chloride 97 (L) 98 - 107 mmol/L    CO2 22 20 - 31 mmol/L    Anion Gap 15 9 - 17 mmol/L    GFR Non-African American >60 >60 mL/min    GFR African American >60 >60 mL/min    GFR Comment          GFR Staging NOT REPORTED    CBC auto differential   Result Value Ref Range    WBC 10.4 3.5 - 11.3 k/uL    RBC 4.28 4.21 - 5.77 m/uL    Hemoglobin 11.8 (L) 13.0 - 17.0 g/dL    Hematocrit 38.1 (L) 40.7 - 50.3 %    MCV 89.0 82.6 - 102.9 fL    MCH 27.6 25.2 - 33.5 pg    MCHC 31.0 28.4 - 34.8 g/dL    RDW 14.8 (H) 11.8 - 14.4 %    Platelets 488 288 - 204 k/uL    MPV 10.4 8.1 - 13.5 fL    NRBC Automated 0.0 0.0 per 100 WBC    Differential Type NOT REPORTED     WBC Morphology NOT REPORTED     RBC Morphology NOT REPORTED     Platelet Estimate NOT REPORTED     Immature Granulocytes 0 0 %    Seg Neutrophils 88 (H) 36 - 66 %    Lymphocytes 3 (L) 24 - 44 %    Monocytes 7 1 - 7 %    Eosinophils % 2 1 - 4 %    Basophils 0 0 - 2 %    Absolute Immature Granulocyte 0.00 0.00 - 0.30 k/uL    Segs Absolute 9.15 (H) 1.8 - 7.7 k/uL    Absolute Lymph # 0.31 (L) 1.0 - 4.8 k/uL    Absolute Mono # 0.73 0.1 - 0.8 k/uL    Absolute Eos # 0.21 0.0 - 0.4 k/uL    Basophils # 0.00 0.0 - 0.2 k/uL    Morphology ANISOCYTOSIS PRESENT    HEMOGLOBIN AND HEMATOCRIT, BLOOD   Result Value Ref Range    Hemoglobin 10.4 (L) 13.0 - 17.0 g/dL    Hematocrit 32.7 (L) 40.7 - 50.3 %   HEMOGLOBIN AND HEMATOCRIT, BLOOD   Result Value Ref Range    Hemoglobin 7.8 (L) 13.0 - 17.0 g/dL    Hematocrit 25.0 (L) 40.7 - 50.3 %   HEMOGLOBIN AND HEMATOCRIT, BLOOD   Result Value Ref Range    Hemoglobin 9.2 (L) 13.0 - 17.0 g/dL    Hematocrit 30.3 (L) 40.7 - 50.3 %   PROTIME-INR   Result Value Ref Range    Protime 13. 5 (H) 9.0 - 12.0 sec    INR 1.3    Troponin   Result Value Ref Range    Troponin T <0.03 <0.03 ng/mL    Troponin Interp         Troponin   Result Value Ref Range    Troponin T <0.03 <0.03 ng/mL    Troponin Interp         Troponin   Result Value Ref Range    Troponin T <0.03 <0.03 ng/mL    Troponin Interp         Basic Metabolic Panel w/ Reflex to MG   Result Value Ref Range    Glucose 100 (H) 70 - 99 mg/dL    BUN 30 (H) 8 - 23 mg/dL    CREATININE 0.71 0.70 - 1.20 mg/dL    Bun/Cre Ratio NOT REPORTED 9 - 20    Calcium 8.0 (L) 8.6 - 10.4 mg/dL    Sodium 138 135 - 144 mmol/L    Potassium 4.3 3.7 - 5.3 mmol/L    Chloride 103 98 - 107 mmol/L    CO2 23 20 - 31 mmol/L    Anion Gap 12 9 - 17 mmol/L    GFR Non-African American >60 >60 mL/min    GFR African American >60 >60 mL/min    GFR Comment          GFR Staging NOT REPORTED    Magnesium   Result Value Ref Range    Magnesium 1.7 1.6 - 2.6 mg/dL   Phosphorus   Result Value Ref Range    Phosphorus 2.9 2.5 - 4.5 mg/dL   SPECIMEN REJECTION   Result Value Ref Range    Specimen Source . BLOOD     Ordered Test [facility]     Reason for Rejection       Unable to perform testing: Specimen quantity not sufficient.    - NOT REPORTED    Hemoglobin and Hematocrit, Blood   Result Value Ref Range    Hemoglobin 9.9 (L) 13.0 - 17.0 g/dL    Hematocrit 31.1 (L) 40.7 - 50.3 %   Basic Metabolic Panel w/ Reflex to MG   Result Value Ref Range    Glucose 83 70 - 99 mg/dL    BUN 15 8 - 23 mg/dL    CREATININE 0.62 (L) 0.70 - 1.20 mg/dL    Bun/Cre Ratio NOT REPORTED 9 - 20    Calcium 7.8 (L) 8.6 - 10.4 mg/dL    Sodium 136 135 - 144 mmol/L    Potassium 3.8 3.7 - 5.3 mmol/L    Chloride 102 98 - 107 mmol/L    CO2 21 20 - 31 mmol/L    Anion Gap 13 9 - 17 mmol/L    GFR Non-African American >60 >60 mL/min    GFR African American >60 >60 mL/min    GFR Comment          GFR Staging NOT REPORTED    HEMOGLOBIN AND HEMATOCRIT, BLOOD   Result Value Ref Range    Hemoglobin 8.9 (L) 13.0 - 17.0 g/dL    Hematocrit 30.0 (L) 40.7 - 50.3 %   HEMOGLOBIN AND HEMATOCRIT, BLOOD   Result Value Ref Range    Hemoglobin 9.1 (L) 13.0 - 17.0 g/dL    Hematocrit 28.3 (L) 40.7 - 50.3 %   HEMOGLOBIN AND HEMATOCRIT, BLOOD   Result Value Ref Range    Hemoglobin 9.6 (L) 13.0 - 17.0 g/dL    Hematocrit 30.8 (L) 40.7 - 50.3 %   HEMOGLOBIN AND HEMATOCRIT, BLOOD   Result Value Ref Range    Hemoglobin 8.6 (L) 13.0 - 17.0 g/dL    Hematocrit 27.5 (L) 40.7 - 50.3 %   Basic Metabolic Panel w/ Reflex to MG   Result Value Ref Range    Glucose 107 (H) 70 - 99 mg/dL    BUN 13 8 - 23 mg/dL    CREATININE 0.51 (L) 0.70 - 1.20 mg/dL    Bun/Cre Ratio NOT REPORTED 9 - 20    Calcium 7.9 (L) 8.6 - 10.4 mg/dL    Sodium 136 135 - 144 mmol/L    Potassium 3.6 (L) 3.7 - 5.3 mmol/L    Chloride 103 98 - 107 mmol/L    CO2 20 20 - 31 mmol/L    Anion Gap 13 9 - 17 mmol/L    GFR Non-African American >60 >60 mL/min    GFR African American >60 >60 mL/min    GFR Comment          GFR Staging NOT REPORTED    HEMOGLOBIN AND HEMATOCRIT, BLOOD   Result Value Ref Range    Hemoglobin 9.0 (L) 13.0 - 17.0 g/dL    Hematocrit 29.6 (L) 40.7 - 50.3 %   HEMOGLOBIN AND HEMATOCRIT, BLOOD   Result Value Ref Range    Hemoglobin 8.9 (L) 13.0 - 17.0 g/dL    Hematocrit 28.6 (L) 40.7 - 50.3 %   HEMOGLOBIN AND HEMATOCRIT, BLOOD   Result Value Ref Range    Hemoglobin 9.1 (L) 13.0 - 17.0 g/dL    Hematocrit 27.5 (L) 40.7 - 50.3 %   Basic Metabolic Panel w/ Reflex to MG   Result Value Ref Range    Glucose 72 70 - 99 mg/dL    BUN 10 8 - 23 mg/dL    CREATININE 0.65 (L) 0.70 - 1.20 mg/dL    Bun/Cre Ratio NOT REPORTED 9 - 20    Calcium 8.1 (L) 8.6 - 10.4 mg/dL    Sodium 135 135 - 144 mmol/L    Potassium 3.7 3.7 - 5.3 mmol/L    Chloride 101 98 - 107 mmol/L    CO2 22 20 - 31 mmol/L    Anion Gap 12 9 - 17 mmol/L    GFR Non-African American >60 >60 mL/min    GFR African American >60 >60 mL/min    GFR Comment          GFR Staging NOT REPORTED    Hemoglobin and Hematocrit, Blood   Result Value Ref Range    Hemoglobin 8.9 Crossmatch Result COMPATIBLE     Unit Number [id number]     Product Code Leukocyte Reduced Red Cell     Unit Divison 0     Dispense Status REL FROM [facility]     Transfusion Status OK TO TRANSFUSE     Crossmatch Result COMPATIBLE    Blood Bank Specimen   Result Value Ref Range    Blood Bank Specimen NOT REPORTED        Xray Chest:  No new chest x-ray    CT Scan:      Echocardiogram:      Cultures:      PFT:  No new PFT      ASSESSMENT:   Diagnosis Orders   1. Chronic bronchitis, unspecified chronic bronchitis type ([address]     2. Anticoagulated     3. Chronic atrial fibrillation (HCC)     4. CA in situ larynx          PLAN:  Patient's COPD is under good control with the present medications. 3.  Continue bronchodilators as before. The no evidence of an acute exacerbation. Other no evidence of any chest infection. No history of any angina, congestive heart failure. He very likely has cor pulmonale but no clinical heart failure. He has not noted any pulmonary aspiration. He has atrial fibrillation which is rate controlled. He is on anticoagulation to prevent any jigjupu-vrgxjis-joaculh process. 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 Patient did not need any prescriptions. He already have had his Pneumovax and influenza vaccines. He is being screened for lung cancer along with for any metastatic disease in the laryngeal cancer. Electronically signed by Angelica Hanson MD on 5/17/2019 at 11:57 AM    Please note that this chart was generated using voice recognition Dragon dictation software. Although every effort was made to ensure the accuracy of this automated transcription, some errors in transcription may have occurred.

## 2020-02-13 NOTE — TELEPHONE ENCOUNTER
Dr Darlyn Adkins, patient is current and due in for an appointment on 5/22/20. Please sign for refill if ok. Thank you.

## 2020-02-14 RX ORDER — IPRATROPIUM/ALBUTEROL SULFATE 20-100 MCG
MIST INHALER (GRAM) INHALATION
Qty: 1 INHALER | Refills: 3 | Status: SHIPPED | OUTPATIENT
Start: 2020-02-14 | End: 2020-07-19

## 2020-03-05 ENCOUNTER — NURSE ONLY (OUTPATIENT)
Dept: FAMILY MEDICINE CLINIC | Age: 85
End: 2020-03-05

## 2020-07-13 ENCOUNTER — VIRTUAL VISIT (OUTPATIENT)
Dept: PULMONOLOGY | Age: 85
End: 2020-07-13
Payer: MEDICARE

## 2020-07-13 PROCEDURE — G8427 DOCREV CUR MEDS BY ELIG CLIN: HCPCS | Performed by: INTERNAL MEDICINE

## 2020-07-13 PROCEDURE — G8421 BMI NOT CALCULATED: HCPCS | Performed by: INTERNAL MEDICINE

## 2020-07-13 PROCEDURE — 99214 OFFICE O/P EST MOD 30 MIN: CPT | Performed by: INTERNAL MEDICINE

## 2020-07-13 PROCEDURE — G8926 SPIRO NO PERF OR DOC: HCPCS | Performed by: INTERNAL MEDICINE

## 2020-07-13 PROCEDURE — 1123F ACP DISCUSS/DSCN MKR DOCD: CPT | Performed by: INTERNAL MEDICINE

## 2020-07-13 PROCEDURE — 4040F PNEUMOC VAC/ADMIN/RCVD: CPT | Performed by: INTERNAL MEDICINE

## 2020-07-13 PROCEDURE — 1036F TOBACCO NON-USER: CPT | Performed by: INTERNAL MEDICINE

## 2020-07-13 PROCEDURE — 3023F SPIROM DOC REV: CPT | Performed by: INTERNAL MEDICINE

## 2020-07-13 NOTE — PROGRESS NOTES
2020    TELEHEALTH EVALUATION -- Audio/Visual (During ZBC- public health emergency)    Patient and physician are located in their individual locations. This is visit is completed via Cloud[employer] application []/ Doxy. 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 His COPD is stable no evidence of an acute exacerbation no hemoptysis no chest pain no fever no pedal edema no thromboembolic process no excessive wheezing. No symptoms of sinusitis. Known to have chronic atrial fibrillation that is under good control with medication. He takes his anticoagulants very regularly and INR has been stable no bleeding no thrombotic process. 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 He was treated with radiation therapy and there has been no recurrence. No history of any pulmonary aspiration. No bone pains. No enlarged lymph nodes in the neck. Review of Systems    Prior to Visit Medications    Medication Sig Taking? Authorizing Provider   albuterol-ipratropium (COMBIVENT RESPIMAT)  MCG/ACT AERS inhaler INHALE 1 PUFF 4 TIMES A DAY AS DIRECTED Yes Nilo Mcfarlane MD   COMBIVENT RESPIMAT  MCG/ACT AERS inhaler INHALE 1 PUFF BY MOUTH EVERY SIX HOURS  Yes Nilo Mcfarlane MD   SYMBICORT 160-4.5 MCG/ACT AERO INHALE 2 PUFFS 2 TIMES A DAY Yes Nilo Mcfarlane MD   metoprolol succinate (TOPROL XL) 25 MG extended release tablet Take 2 tablets by mouth daily Yes Mars Hernandez MD   donepezil (ARICEPT) 10 MG tablet Take 1 tablet by mouth nightly Yes Mars Hernandez MD   zolpidem (AMBIEN) 10 MG tablet Take 10 mg by mouth nightly as needed for Sleep. . Yes Historical Provider, MD   calcium carbonate 1500 (600 Ca) MG TABS tablet Take 1 tablet by mouth daily  Patient taking differently: Take 600 antigen, urine    Specimen: Urine, clean catch   Result Value Ref Range    Specimen Description . CLEAN CATCH URINE     Special Requests NOT REPORTED     Direct Exam       Urine negative for L. pneumophilia serogroup 1 antigen. Infection due to    Direct Exam        Legionella cannot be ruled out since (1) other L. pneumophilia serogroups and    Direct Exam        other Legionella species may cause disease, (2) antigen may not be present in    Direct Exam        early infection (<3 days). and (3) the level of antigen present in the urine    Direct Exam  may be below the detectable levels of this test.     Status FINAL 07/26/2018    Strep Pneumoniae Antigen    Specimen: Urine, clean catch   Result Value Ref Range    Specimen Description . CLEAN CATCH URINE     Special Requests NOT REPORTED     Direct Exam NEGATIVE: Strep pneumoniae antigen not detected     Status FINAL 07/26/2018    Comprehensive Metabolic Panel   Result Value Ref Range    Glucose 109 (H) 70 - 99 mg/dL    BUN 25 (H) 8 - 23 mg/dL    CREATININE 0.62 (L) 0.70 - 1.20 mg/dL    Bun/Cre Ratio NOT REPORTED 9 - 20    Calcium 8.8 8.6 - 10.4 mg/dL    Sodium 134 (L) 135 - 144 mmol/L    Potassium 5.1 3.7 - 5.3 mmol/L    Chloride 96 (L) 98 - 107 mmol/L    CO2 23 20 - 31 mmol/L    Anion Gap 15 9 - 17 mmol/L    Alkaline Phosphatase 111 40 - 129 U/L    ALT 15 5 - 41 U/L    AST 30 <40 U/L    Total Bilirubin 0.71 0.3 - 1.2 mg/dL    Total Protein 7.2 6.4 - 8.3 g/dL    Alb 3.4 (L) 3.5 - 5.2 g/dL    Albumin/Globulin Ratio 0.9 (L) 1.0 - 2.5    GFR Non-African American >60 >60 mL/min    GFR African American >60 >60 mL/min    GFR Comment          GFR Staging NOT REPORTED    CBC   Result Value Ref Range    WBC 11.3 3.5 - 11.3 k/uL    RBC 4.65 4.21 - 5.77 m/uL    Hemoglobin 12.9 (L) 13.0 - 17.0 g/dL    Hematocrit 41.4 40.7 - 50.3 %    MCV 89.0 82.6 - 102.9 fL    MCH 27.7 25.2 - 33.5 pg    MCHC 31.2 28.4 - 34.8 g/dL    RDW 15.0 (H) 11.8 - 14.4 %    Platelets 970 175 - 056 k/uL MPV 11.0 8.1 - 13.5 fL    NRBC Automated 0.0 0.0 per 100 WBC   Lactate, Sepsis   Result Value Ref Range    Lactic Acid, Sepsis NOT REPORTED 0.5 - 1.9 mmol/L    Lactic Acid, Sepsis, Whole Blood 1.5 0.5 - 1.9 mmol/L   PROTIME-INR   Result Value Ref Range    Protime 15.4 (H) 9.0 - 12.0 sec    INR 1.5    APTT   Result Value Ref Range    PTT 38.4 (H) 20.5 - 30.5 sec   Troponin   Result Value Ref Range    Troponin T <0.03 <0.03 ng/mL    Troponin Interp         Basic Metabolic Panel w/ Reflex to MG   Result Value Ref Range    Glucose 92 70 - 99 mg/dL    BUN 23 8 - 23 mg/dL    CREATININE 0.72 0.70 - 1.20 mg/dL    Bun/Cre Ratio NOT REPORTED 9 - 20    Calcium 8.2 (L) 8.6 - 10.4 mg/dL    Sodium 137 135 - 144 mmol/L    Potassium 4.5 3.7 - 5.3 mmol/L    Chloride 99 98 - 107 mmol/L    CO2 26 20 - 31 mmol/L    Anion Gap 12 9 - 17 mmol/L    GFR Non-African American >60 >60 mL/min    GFR African American >60 >60 mL/min    GFR Comment          GFR Staging NOT REPORTED    CBC auto differential   Result Value Ref Range    WBC 8.5 3.5 - 11.3 k/uL    RBC 4.22 4.21 - 5.77 m/uL    Hemoglobin 11.7 (L) 13.0 - 17.0 g/dL    Hematocrit 37.9 (L) 40.7 - 50.3 %    MCV 89.8 82.6 - 102.9 fL    MCH 27.7 25.2 - 33.5 pg    MCHC 30.9 28.4 - 34.8 g/dL    RDW 15.1 (H) 11.8 - 14.4 %    Platelets 706 707 - 648 k/uL    MPV 10.3 8.1 - 13.5 fL    NRBC Automated 0.0 0.0 per 100 WBC    Differential Type NOT REPORTED     WBC Morphology NOT REPORTED     RBC Morphology NOT REPORTED     Platelet Estimate NOT REPORTED     Immature Granulocytes 1 (H) 0 %    Seg Neutrophils 80 (H) 36 - 65 %    Lymphocytes 7 (L) 24 - 43 %    Monocytes 9 3 - 12 %    Eosinophils % 2 1 - 4 %    Basophils 1 0 - 2 %    Absolute Immature Granulocyte 0.09 0.00 - 0.30 k/uL    Segs Absolute 6.78 1.50 - 8.10 k/uL    Absolute Lymph # 0.60 (L) 1.10 - 3.70 k/uL    Absolute Mono # 0.77 0.10 - 1.20 k/uL    Absolute Eos # 0.17 0.00 - 0.44 k/uL    Basophils Absolute 0.09 0.00 - 0.20 k/uL Morphology ANISOCYTOSIS PRESENT    Procalcitonin   Result Value Ref Range    Procalcitonin 0.04 <0.09 ng/mL   Sedimentation Rate   Result Value Ref Range    Sed Rate 28 (H) 0 - 10 mm   C-Reactive Protein   Result Value Ref Range    .6 (H) 0.0 - 5.0 mg/L   Vitamin D 25 Hydroxy   Result Value Ref Range    Vit D, 25-Hydroxy 17.5 (L) 30.0 - 100.0 ng/mL   Basic Metabolic Panel w/ Reflex to MG   Result Value Ref Range    Glucose 88 70 - 99 mg/dL    BUN 18 8 - 23 mg/dL    CREATININE 0.62 (L) 0.70 - 1.20 mg/dL    Bun/Cre Ratio NOT REPORTED 9 - 20    Calcium 8.0 (L) 8.6 - 10.4 mg/dL    Sodium 134 (L) 135 - 144 mmol/L    Potassium 3.9 3.7 - 5.3 mmol/L    Chloride 97 (L) 98 - 107 mmol/L    CO2 22 20 - 31 mmol/L    Anion Gap 15 9 - 17 mmol/L    GFR Non-African American >60 >60 mL/min    GFR African American >60 >60 mL/min    GFR Comment          GFR Staging NOT REPORTED    CBC auto differential   Result Value Ref Range    WBC 10.4 3.5 - 11.3 k/uL    RBC 4.28 4.21 - 5.77 m/uL    Hemoglobin 11.8 (L) 13.0 - 17.0 g/dL    Hematocrit 38.1 (L) 40.7 - 50.3 %    MCV 89.0 82.6 - 102.9 fL    MCH 27.6 25.2 - 33.5 pg    MCHC 31.0 28.4 - 34.8 g/dL    RDW 14.8 (H) 11.8 - 14.4 %    Platelets 626 944 - 990 k/uL    MPV 10.4 8.1 - 13.5 fL    NRBC Automated 0.0 0.0 per 100 WBC    Differential Type NOT REPORTED     WBC Morphology NOT REPORTED     RBC Morphology NOT REPORTED     Platelet Estimate NOT REPORTED     Immature Granulocytes 0 0 %    Seg Neutrophils 88 (H) 36 - 66 %    Lymphocytes 3 (L) 24 - 44 %    Monocytes 7 1 - 7 %    Eosinophils % 2 1 - 4 %    Basophils 0 0 - 2 %    Absolute Immature Granulocyte 0.00 0.00 - 0.30 k/uL    Segs Absolute 9.15 (H) 1.8 - 7.7 k/uL    Absolute Lymph # 0.31 (L) 1.0 - 4.8 k/uL    Absolute Mono # 0.73 0.1 - 0.8 k/uL    Absolute Eos # 0.21 0.0 - 0.4 k/uL    Basophils Absolute 0.00 0.0 - 0.2 k/uL    Morphology ANISOCYTOSIS PRESENT    HEMOGLOBIN AND HEMATOCRIT, BLOOD   Result Value Ref Range    Hemoglobin 10.4 (L) 13.0 - 17.0 g/dL    Hematocrit 32.7 (L) 40.7 - 50.3 %   HEMOGLOBIN AND HEMATOCRIT, BLOOD   Result Value Ref Range    Hemoglobin 7.8 (L) 13.0 - 17.0 g/dL    Hematocrit 25.0 (L) 40.7 - 50.3 %   HEMOGLOBIN AND HEMATOCRIT, BLOOD   Result Value Ref Range    Hemoglobin 9.2 (L) 13.0 - 17.0 g/dL    Hematocrit 30.3 (L) 40.7 - 50.3 %   PROTIME-INR   Result Value Ref Range    Protime 13.5 (H) 9.0 - 12.0 sec    INR 1.3    Troponin   Result Value Ref Range    Troponin T <0.03 <0.03 ng/mL    Troponin Interp         Troponin   Result Value Ref Range    Troponin T <0.03 <0.03 ng/mL    Troponin Interp         Troponin   Result Value Ref Range    Troponin T <0.03 <0.03 ng/mL    Troponin Interp         Basic Metabolic Panel w/ Reflex to MG   Result Value Ref Range    Glucose 100 (H) 70 - 99 mg/dL    BUN 30 (H) 8 - 23 mg/dL    CREATININE 0.71 0.70 - 1.20 mg/dL    Bun/Cre Ratio NOT REPORTED 9 - 20    Calcium 8.0 (L) 8.6 - 10.4 mg/dL    Sodium 138 135 - 144 mmol/L    Potassium 4.3 3.7 - 5.3 mmol/L    Chloride 103 98 - 107 mmol/L    CO2 23 20 - 31 mmol/L    Anion Gap 12 9 - 17 mmol/L    GFR Non-African American >60 >60 mL/min    GFR African American >60 >60 mL/min    GFR Comment          GFR Staging NOT REPORTED    Magnesium   Result Value Ref Range    Magnesium 1.7 1.6 - 2.6 mg/dL   Phosphorus   Result Value Ref Range    Phosphorus 2.9 2.5 - 4.5 mg/dL   SPECIMEN REJECTION   Result Value Ref Range    Specimen Source . BLOOD     Ordered Test [place]     Reason for Rejection       Unable to perform testing: Specimen quantity not sufficient.    - NOT REPORTED    Hemoglobin and Hematocrit, Blood   Result Value Ref Range    Hemoglobin 9.9 (L) 13.0 - 17.0 g/dL    Hematocrit 31.1 (L) 40.7 - 50.3 %   Basic Metabolic Panel w/ Reflex to MG   Result Value Ref Range    Glucose 83 70 - 99 mg/dL    BUN 15 8 - 23 mg/dL    CREATININE 0.62 (L) 0.70 - 1.20 mg/dL    Bun/Cre Ratio NOT REPORTED 9 - 20    Calcium 7.8 (L) 8.6 - 10.4 mg/dL    Sodium 136 135 - (L) 8.6 - 10.4 mg/dL    Sodium 135 135 - 144 mmol/L    Potassium 3.7 3.7 - 5.3 mmol/L    Chloride 101 98 - 107 mmol/L    CO2 22 20 - 31 mmol/L    Anion Gap 12 9 - 17 mmol/L    GFR Non-African American >60 >60 mL/min    GFR African American >60 >60 mL/min    GFR Comment          GFR Staging NOT REPORTED    Hemoglobin and Hematocrit, Blood   Result Value Ref Range    Hemoglobin 8.9 (L) 13.0 - 17.0 g/dL    Hematocrit 29.1 (L) 40.7 - 50.3 %   Basic Metabolic Panel w/ Reflex to MG   Result Value Ref Range    Glucose 93 70 - 99 mg/dL    BUN 5 (L) 8 - 23 mg/dL    CREATININE 0.54 (L) 0.70 - 1.20 mg/dL    Bun/Cre Ratio NOT REPORTED 9 - 20    Calcium 7.9 (L) 8.6 - 10.4 mg/dL    Sodium 135 135 - 144 mmol/L    Potassium 3.5 (L) 3.7 - 5.3 mmol/L    Chloride 98 98 - 107 mmol/L    CO2 25 20 - 31 mmol/L    Anion Gap 12 9 - 17 mmol/L    GFR Non-African American >60 >60 mL/min    GFR African American >60 >60 mL/min    GFR Comment          GFR Staging NOT REPORTED    Hemoglobin and Hematocrit, Blood   Result Value Ref Range    Hemoglobin 9.1 (L) 13.0 - 17.0 g/dL    Hematocrit 28.1 (L) 40.7 - 50.3 %   Magnesium   Result Value Ref Range    Magnesium 1.6 1.6 - 2.6 mg/dL   EKG 12 Lead   Result Value Ref Range    Ventricular Rate 90 BPM    Atrial Rate 73 BPM    QRS Duration 94 ms    Q-T Interval 364 ms    QTc Calculation (Bazett) 445 ms    R Axis 74 degrees    T Axis 49 degrees   EKG 12 Lead   Result Value Ref Range    Ventricular Rate 128 BPM    Atrial Rate 127 BPM    QRS Duration 92 ms    Q-T Interval 402 ms    QTc Calculation (Bazett) 586 ms    R Axis 77 degrees    T Axis 65 degrees   EKG 12 Lead   Result Value Ref Range    Ventricular Rate 100 BPM    Atrial Rate 119 BPM    QRS Duration 90 ms    Q-T Interval 364 ms    QTc Calculation (Bazett) 469 ms    R Axis 76 degrees    T Axis 56 degrees   TYPE AND SCREEN   Result Value Ref Range    Expiration Date 07/31/2018     Arm Band Number [id number]     ABO/Rh O NEGATIVE     Antibody Screen NEGATIVE     Unit Number [id number]     Product Code Leukocyte Reduced Irradiated Red Cell     Unit Divison 0     Dispense Status TRANSFUSED     Transfusion Status OK TO TRANSFUSE     Crossmatch Result COMPATIBLE     Unit Number [id number]     Product Code Leukocyte Reduced Red Cell     Unit Divison 0     Dispense Status REL FROM [facility]     Transfusion Status OK TO TRANSFUSE     Crossmatch Result COMPATIBLE     Unit Number [id number]     Product Code Leukocyte Reduced Red Cell     Unit Divison 0     Dispense Status REL FROM [facility]     Transfusion Status OK TO TRANSFUSE     Crossmatch Result COMPATIBLE    Blood Bank Specimen   Result Value Ref Range    Blood Bank Specimen NOT REPORTED        No results found. PHYSICAL EXAMINATION:  Due to this being a TeleHealth encounter, evaluation of the following organ systems is limited: Vitals/Constitutional/EENT/Resp/CV/GI//MS/Neuro/Skin/Heme-Lymph-Imm. Constitutional: [x] Appears well-developed and well-nourished. [] Abnormal  Mental status  [x] Alert and awake  [x] Oriented to person/place/time [x]Able to follow commands    [x] No apparent distress      Eyes:  EOM    [x]  Normal  [] Abnormal-  Sclera  [x]  Normal  [] Abnormal -         Discharge [x]  None visible  [] Abnormal -    HENT:   [x] Normocephalic, atraumatic. [] Abnormal shaped head   [x] Mouth/Throat: Mucous membranes are moist.     Ears [x] Normal  [] Abnormal-    Neck: [x] Normal range of motion [x] Supple [x] No visualized mass. Pulmonary/Chest: [x] Respiratory effort normal.  [x] No visualized signs of difficulty breathing or respiratory distress        [x] Abnormal because of COPD  Musculoskeletal:   [x] Normal range of motion. [x] Normal gait with no signs of ataxia. [x]  No signs of cyanosis of the peripheral portions of extremities.          [] Abnormal       Neurological:        [x] Normal cranial nerve (limited exam to video visit) [x] No focal weakness observed       [] Abnormal          Speech       [x] Normal   [] Abnormal     Skin:        [x] No rash on visible skin  [x] Normal  [] Abnormal     Psychiatric:       [x] Normal  [] Abnormal        [x] Normal Mood  [] Anxious appearing        Other Pertinent Exam Findings:       ASSESSMENT:  COPD    Carcinoma of the larynx    Evaluation    On anticoagulation          Plan:   1. Pulmonary status is very stable. Advised him to continue the same bronchodilator his Combivent was refilled. 2.   3.   4. Continue anticoagulation as before  5.   6. No evidence of any bleeding. 7. He has had Pneumovax  8.   9. He have had flu vaccine  10. 11. We will continue monitor for any metastatic disease. 12.   13. An  electronic signature was used to authenticate this note. --Shaun Beatty MD on 7/13/2020 at 11:01 AM    9}    Pursuant to the emergency declaration under the [address] [place] [address], [facility] waiver authority and the [employer] Act, this Virtual  Visit was conducted, with patient's consent, to reduce the patient's risk of exposure to COVID-19 and provide continuity of care for an established patient.     Services were provided through a video synchronous discussion virtually to substitute for in-person clinic visit.     ________________________________________________________________________________________________________

## 2020-07-19 RX ORDER — IPRATROPIUM/ALBUTEROL SULFATE 20-100 MCG
MIST INHALER (GRAM) INHALATION
Qty: 4 G | Refills: 0 | Status: SHIPPED | OUTPATIENT
Start: 2020-07-19

## 2020-08-28 NOTE — TELEPHONE ENCOUNTER
Please sign new Rx - previous request went to [facility] - wife called the office, she canceled that order and would like new Rx to go to [place].

## 2020-10-16 ENCOUNTER — VIRTUAL VISIT (OUTPATIENT)
Dept: PULMONOLOGY | Age: 85
End: 2020-10-16
Payer: MEDICARE

## 2020-10-16 PROCEDURE — 1123F ACP DISCUSS/DSCN MKR DOCD: CPT | Performed by: INTERNAL MEDICINE

## 2020-10-16 PROCEDURE — G8427 DOCREV CUR MEDS BY ELIG CLIN: HCPCS | Performed by: INTERNAL MEDICINE

## 2020-10-16 PROCEDURE — 4040F PNEUMOC VAC/ADMIN/RCVD: CPT | Performed by: INTERNAL MEDICINE

## 2020-10-16 PROCEDURE — 99214 OFFICE O/P EST MOD 30 MIN: CPT | Performed by: INTERNAL MEDICINE

## 2020-10-16 RX ORDER — MONTELUKAST SODIUM 10 MG/1
TABLET ORAL
COMMUNITY
Start: 2020-10-05

## 2020-10-16 RX ORDER — MIRTAZAPINE 15 MG/1
TABLET, FILM COATED ORAL
COMMUNITY
Start: 2020-07-28

## 2020-10-16 RX ORDER — APIXABAN 2.5 MG/1
TABLET, FILM COATED ORAL
COMMUNITY
Start: 2020-10-05

## 2020-10-16 ASSESSMENT — SLEEP AND FATIGUE QUESTIONNAIRES
HOW LIKELY ARE YOU TO NOD OFF OR FALL ASLEEP IN A CAR, WHILE STOPPED FOR A FEW MINUTES IN TRAFFIC: 0
HOW LIKELY ARE YOU TO NOD OFF OR FALL ASLEEP WHILE SITTING QUIETLY AFTER LUNCH WITHOUT ALCOHOL: 3
HOW LIKELY ARE YOU TO NOD OFF OR FALL ASLEEP WHILE WATCHING TV: 3
HOW LIKELY ARE YOU TO NOD OFF OR FALL ASLEEP WHEN YOU ARE A PASSENGER IN A CAR FOR AN HOUR WITHOUT A BREAK: 3
ESS TOTAL SCORE: 14
HOW LIKELY ARE YOU TO NOD OFF OR FALL ASLEEP WHILE SITTING AND TALKING TO SOMEONE: 1
HOW LIKELY ARE YOU TO NOD OFF OR FALL ASLEEP WHILE SITTING AND READING: 0
HOW LIKELY ARE YOU TO NOD OFF OR FALL ASLEEP WHILE LYING DOWN TO REST IN THE AFTERNOON WHEN CIRCUMSTANCES PERMIT: 3
HOW LIKELY ARE YOU TO NOD OFF OR FALL ASLEEP WHILE SITTING INACTIVE IN A PUBLIC PLACE: 1

## 2020-10-16 NOTE — PROGRESS NOTES
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 he also atrial fibrillation and is on if on anticoagulation. No history of pulmonary aspiration. No fever no chills no evidence of any heart failure. 10/16/2020    TELEHEALTH EVALUATION -- Audio/Visual (During [facility]- public health emergency)    Patient and physician are located in their individual locations. This is visit is completed via [employer] IP application []/ Doxy. me[] / Telephone []     HPI:    Bere Best (:  10/26/1931) has requested an audio/video evaluation for the following concern(s):    He is known to have chronic obstructive lung disease due to chronic bronchitis and emphysema. Pulmonary status is stable no evidence of any chest infection sputum is not excessive in volume. No change in the color with no fever chills no chest pain no pedal edema no thromboembolic process. He does have atrial fibrillation which is rate controlled. He is on anticoagulation. No evidence of any thromboembolic process. He have had carcinoma of the larynx which was resected. No pulmonary aspiration. Review of Systems    Prior to Visit Medications    Medication Sig Taking?  Authorizing Provider   albuterol-ipratropium (COMBIVENT RESPIMAT)  MCG/ACT AERS inhaler INHALE 1 PUFF 4 TIMES A DAY AS DIRECTED Yes Rosa Dawkins MD   COMBIVENT RESPIMAT  MCG/ACT AERS inhaler INHALE 1 PUFF BY MOUTH EVERY SIX HOURS  Yes Rosa Dawkins MD   metoprolol succinate (TOPROL XL) 25 MG extended release tablet Take 2 tablets by mouth daily Yes Lucero Esquivel MD   tamsulosin (FLOMAX) 0.4 MG capsule Take 0.3 mg by mouth daily  Yes Historical Provider, MD   ELIQUIS 2.5 MG TABS tablet TAKE 1 TABLET BY MOUTH TWO TIMES A DAY  Historical Provider, MD   mirtazapine (REMERON) 15 MG tablet TAKE 1 TABLET BY MOUTH AT BEDTIME  Historical Provider, MD   montelukast (SINGULAIR) 10 MG tablet TAKE 1 TABLET BY MOUTH ONE TIME A DAY  Historical Provider, MD   SYMBICORT 160-4.5 MCG/ACT AERO INHALE 2 PUFFS 2 TIMES A DAY  Patient not taking: Reported on 10/16/2020  Navneet Chavis MD   pantoprazole (PROTONIX) 40 MG tablet Take 1 tablet by mouth 2 times daily for 14 days  Patient not taking: Reported on 10/16/2020  [facility], MD   donepezil (ARICEPT) 10 MG tablet Take 1 tablet by mouth nightly  Patient not taking: Reported on 10/16/2020  [facility], MD   zolpidem (AMBIEN) 10 MG tablet Take 10 mg by mouth nightly as needed for Sleep. Sona Money Historical Provider, MD   calcium carbonate 1500 (600 Ca) MG TABS tablet Take 1 tablet by mouth daily  Patient not taking: Reported on 10/16/2020  Ney Allsion MD   vitamin D (ERGOCALCIFEROL) 24840 units CAPS capsule Take 1 capsule by mouth once a week for 11 doses  Patient not taking: Reported on 10/16/2020  Ney Allison MD   colchicine (COLCRYS) 0.6 MG tablet Take 1 tablet by mouth 2 times daily  Patient not taking: Reported on 10/16/2020  Karen Talamantes DPM   ipratropium (ATROVENT) 0.06 % nasal spray Pt does not take  Historical Provider, MD   dextromethorphan-guaiFENesin (ROBITUSSIN-DM)  MG/5ML syrup Take 10 mLs by mouth every 6 hours as needed for Cough. Patient not taking: Reported on 10/16/2020  Alix Portillo MD       Social History     Tobacco Use    Smoking status: Former Smoker     Packs/day: 0.25     Years: 24.00     Pack years: 6.00     Start date: 1946     Last attempt to quit: 1970     Years since quittin.8    Smokeless tobacco: Never Used   Substance Use Topics    Alcohol use:  Yes     Alcohol/week: 0.0 standard drinks Comment: socially    Drug use: No            RECORD REVIEW: Previous medical records were reviewed at today's visit. Wt Readings from Last 3 Encounters:   05/17/19 132 lb (59.9 kg)   08/30/18 124 lb (56.2 kg)   07/31/18 134 lb 11.2 oz (61.1 kg)       Results for orders placed or performed during the hospital encounter of 07/25/18   CULTURE BLOOD #1    Specimen: Blood   Result Value Ref Range    Specimen Description . BLOOD     Special Requests LT FA 10ML     Culture NO GROWTH 6 DAYS     Status FINAL 07/31/2018    CULTURE BLOOD #2    Specimen: Blood   Result Value Ref Range    Specimen Description . BLOOD     Special Requests R FA 3ML     Culture NO GROWTH 6 DAYS     Status FINAL 07/31/2018    Legionella antigen, urine    Specimen: Urine, clean catch   Result Value Ref Range    Specimen Description . CLEAN CATCH URINE     Special Requests NOT REPORTED     Direct Exam       Urine negative for L. pneumophilia serogroup 1 antigen. Infection due to    Direct Exam        Legionella cannot be ruled out since (1) other L. pneumophilia serogroups and    Direct Exam        other Legionella species may cause disease, (2) antigen may not be present in    Direct Exam        early infection (<3 days). and (3) the level of antigen present in the urine    Direct Exam  may be below the detectable levels of this test.     Status FINAL 07/26/2018    Strep Pneumoniae Antigen    Specimen: Urine, clean catch   Result Value Ref Range    Specimen Description . CLEAN CATCH URINE     Special Requests NOT REPORTED     Direct Exam NEGATIVE: Strep pneumoniae antigen not detected     Status FINAL 07/26/2018    Comprehensive Metabolic Panel   Result Value Ref Range    Glucose 109 (H) 70 - 99 mg/dL    BUN 25 (H) 8 - 23 mg/dL    CREATININE 0.62 (L) 0.70 - 1.20 mg/dL    Bun/Cre Ratio NOT REPORTED 9 - 20    Calcium 8.8 8.6 - 10.4 mg/dL    Sodium 134 (L) 135 - 144 mmol/L    Potassium 5.1 3.7 - 5.3 mmol/L    Chloride 96 (L) 98 - 107 mmol/L    CO2 23 20 - 31 mmol/L    Anion Gap 15 9 - 17 mmol/L    Alkaline Phosphatase 111 40 - 129 U/L    ALT 15 5 - 41 U/L    AST 30 <40 U/L    Total Bilirubin 0.71 0.3 - 1.2 mg/dL    Total Protein 7.2 6.4 - 8.3 g/dL    Alb 3.4 (L) 3.5 - 5.2 g/dL    Albumin/Globulin Ratio 0.9 (L) 1.0 - 2.5    GFR Non-African American >60 >60 mL/min    GFR African American >60 >60 mL/min    GFR Comment          GFR Staging NOT REPORTED    CBC   Result Value Ref Range    WBC 11.3 3.5 - 11.3 k/uL    RBC 4.65 4.21 - 5.77 m/uL    Hemoglobin 12.9 (L) 13.0 - 17.0 g/dL    Hematocrit 41.4 40.7 - 50.3 %    MCV 89.0 82.6 - 102.9 fL    MCH 27.7 25.2 - 33.5 pg    MCHC 31.2 28.4 - 34.8 g/dL    RDW 15.0 (H) 11.8 - 14.4 %    Platelets 212 732 - 276 k/uL    MPV 11.0 8.1 - 13.5 fL    NRBC Automated 0.0 0.0 per 100 WBC   Lactate, Sepsis   Result Value Ref Range    Lactic Acid, Sepsis NOT REPORTED 0.5 - 1.9 mmol/L    Lactic Acid, Sepsis, Whole Blood 1.5 0.5 - 1.9 mmol/L   PROTIME-INR   Result Value Ref Range    Protime 15.4 (H) 9.0 - 12.0 sec    INR 1.5    APTT   Result Value Ref Range    PTT 38.4 (H) 20.5 - 30.5 sec   Troponin   Result Value Ref Range    Troponin T <0.03 <0.03 ng/mL    Troponin Interp         Basic Metabolic Panel w/ Reflex to MG   Result Value Ref Range    Glucose 92 70 - 99 mg/dL    BUN 23 8 - 23 mg/dL    CREATININE 0.72 0.70 - 1.20 mg/dL    Bun/Cre Ratio NOT REPORTED 9 - 20    Calcium 8.2 (L) 8.6 - 10.4 mg/dL    Sodium 137 135 - 144 mmol/L    Potassium 4.5 3.7 - 5.3 mmol/L    Chloride 99 98 - 107 mmol/L    CO2 26 20 - 31 mmol/L    Anion Gap 12 9 - 17 mmol/L    GFR Non-African American >60 >60 mL/min    GFR African American >60 >60 mL/min    GFR Comment          GFR Staging NOT REPORTED    CBC auto differential   Result Value Ref Range    WBC 8.5 3.5 - 11.3 k/uL    RBC 4.22 4.21 - 5.77 m/uL    Hemoglobin 11.7 (L) 13.0 - 17.0 g/dL    Hematocrit 37.9 (L) 40.7 - 50.3 %    MCV 89.8 82.6 - 102.9 fL    MCH 27.7 25.2 - 33.5 pg    MCHC 30.9 28.4 - 34.8 g/dL    RDW 15.1 (H) 11.8 - 14.4 %    Platelets 273 755 - 701 k/uL    MPV 10.3 8.1 - 13.5 fL    NRBC Automated 0.0 0.0 per 100 WBC    Differential Type NOT REPORTED     WBC Morphology NOT REPORTED     RBC Morphology NOT REPORTED     Platelet Estimate NOT REPORTED     Immature Granulocytes 1 (H) 0 %    Seg Neutrophils 80 (H) 36 - 65 %    Lymphocytes 7 (L) 24 - 43 %    Monocytes 9 3 - 12 %    Eosinophils % 2 1 - 4 %    Basophils 1 0 - 2 %    Absolute Immature Granulocyte 0.09 0.00 - 0.30 k/uL    Segs Absolute 6.78 1.50 - 8.10 k/uL    Absolute Lymph # 0.60 (L) 1.10 - 3.70 k/uL    Absolute Mono # 0.77 0.10 - 1.20 k/uL    Absolute Eos # 0.17 0.00 - 0.44 k/uL    Basophils Absolute 0.09 0.00 - 0.20 k/uL    Morphology ANISOCYTOSIS PRESENT    Procalcitonin   Result Value Ref Range    Procalcitonin 0.04 <0.09 ng/mL   Sedimentation Rate   Result Value Ref Range    Sed Rate 28 (H) 0 - 10 mm   C-Reactive Protein   Result Value Ref Range    .6 (H) 0.0 - 5.0 mg/L   Vitamin D 25 Hydroxy   Result Value Ref Range    Vit D, 25-Hydroxy 17.5 (L) 30.0 - 100.0 ng/mL   Basic Metabolic Panel w/ Reflex to MG   Result Value Ref Range    Glucose 88 70 - 99 mg/dL    BUN 18 8 - 23 mg/dL    CREATININE 0.62 (L) 0.70 - 1.20 mg/dL    Bun/Cre Ratio NOT REPORTED 9 - 20    Calcium 8.0 (L) 8.6 - 10.4 mg/dL    Sodium 134 (L) 135 - 144 mmol/L    Potassium 3.9 3.7 - 5.3 mmol/L    Chloride 97 (L) 98 - 107 mmol/L    CO2 22 20 - 31 mmol/L    Anion Gap 15 9 - 17 mmol/L    GFR Non-African American >60 >60 mL/min    GFR African American >60 >60 mL/min    GFR Comment          GFR Staging NOT REPORTED    CBC auto differential   Result Value Ref Range    WBC 10.4 3.5 - 11.3 k/uL    RBC 4.28 4.21 - 5.77 m/uL    Hemoglobin 11.8 (L) 13.0 - 17.0 g/dL    Hematocrit 38.1 (L) 40.7 - 50.3 %    MCV 89.0 82.6 - 102.9 fL    MCH 27.6 25.2 - 33.5 pg    MCHC 31.0 28.4 - 34.8 g/dL    RDW 14.8 (H) 11.8 - 14.4 %    Platelets 352 243 - 073 k/uL    MPV 10.4 8.1 - 13.5 fL NRBC Automated 0.0 0.0 per 100 WBC    Differential Type NOT REPORTED     WBC Morphology NOT REPORTED     RBC Morphology NOT REPORTED     Platelet Estimate NOT REPORTED     Immature Granulocytes 0 0 %    Seg Neutrophils 88 (H) 36 - 66 %    Lymphocytes 3 (L) 24 - 44 %    Monocytes 7 1 - 7 %    Eosinophils % 2 1 - 4 %    Basophils 0 0 - 2 %    Absolute Immature Granulocyte 0.00 0.00 - 0.30 k/uL    Segs Absolute 9.15 (H) 1.8 - 7.7 k/uL    Absolute Lymph # 0.31 (L) 1.0 - 4.8 k/uL    Absolute Mono # 0.73 0.1 - 0.8 k/uL    Absolute Eos # 0.21 0.0 - 0.4 k/uL    Basophils Absolute 0.00 0.0 - 0.2 k/uL    Morphology ANISOCYTOSIS PRESENT    HEMOGLOBIN AND HEMATOCRIT, BLOOD   Result Value Ref Range    Hemoglobin 10.4 (L) 13.0 - 17.0 g/dL    Hematocrit 32.7 (L) 40.7 - 50.3 %   HEMOGLOBIN AND HEMATOCRIT, BLOOD   Result Value Ref Range    Hemoglobin 7.8 (L) 13.0 - 17.0 g/dL    Hematocrit 25.0 (L) 40.7 - 50.3 %   HEMOGLOBIN AND HEMATOCRIT, BLOOD   Result Value Ref Range    Hemoglobin 9.2 (L) 13.0 - 17.0 g/dL    Hematocrit 30.3 (L) 40.7 - 50.3 %   PROTIME-INR   Result Value Ref Range    Protime 13.5 (H) 9.0 - 12.0 sec    INR 1.3    Troponin   Result Value Ref Range    Troponin T <0.03 <0.03 ng/mL    Troponin Interp         Troponin   Result Value Ref Range    Troponin T <0.03 <0.03 ng/mL    Troponin Interp         Troponin   Result Value Ref Range    Troponin T <0.03 <0.03 ng/mL    Troponin Interp         Basic Metabolic Panel w/ Reflex to MG   Result Value Ref Range    Glucose 100 (H) 70 - 99 mg/dL    BUN 30 (H) 8 - 23 mg/dL    CREATININE 0.71 0.70 - 1.20 mg/dL    Bun/Cre Ratio NOT REPORTED 9 - 20    Calcium 8.0 (L) 8.6 - 10.4 mg/dL    Sodium 138 135 - 144 mmol/L    Potassium 4.3 3.7 - 5.3 mmol/L    Chloride 103 98 - 107 mmol/L    CO2 23 20 - 31 mmol/L    Anion Gap 12 9 - 17 mmol/L    GFR Non-African American >60 >60 mL/min    GFR African American >60 >60 mL/min    GFR Comment          GFR Staging NOT REPORTED    Magnesium   Result Value Ref Range    Magnesium 1.7 1.6 - 2.6 mg/dL   Phosphorus   Result Value Ref Range    Phosphorus 2.9 2.5 - 4.5 mg/dL   SPECIMEN REJECTION   Result Value Ref Range    Specimen Source . BLOOD     Ordered Test [facility]     Reason for Rejection       Unable to perform testing: Specimen quantity not sufficient.    - NOT REPORTED    Hemoglobin and Hematocrit, Blood   Result Value Ref Range    Hemoglobin 9.9 (L) 13.0 - 17.0 g/dL    Hematocrit 31.1 (L) 40.7 - 50.3 %   Basic Metabolic Panel w/ Reflex to MG   Result Value Ref Range    Glucose 83 70 - 99 mg/dL    BUN 15 8 - 23 mg/dL    CREATININE 0.62 (L) 0.70 - 1.20 mg/dL    Bun/Cre Ratio NOT REPORTED 9 - 20    Calcium 7.8 (L) 8.6 - 10.4 mg/dL    Sodium 136 135 - 144 mmol/L    Potassium 3.8 3.7 - 5.3 mmol/L    Chloride 102 98 - 107 mmol/L    CO2 21 20 - 31 mmol/L    Anion Gap 13 9 - 17 mmol/L    GFR Non-African American >60 >60 mL/min    GFR African American >60 >60 mL/min    GFR Comment          GFR Staging NOT REPORTED    HEMOGLOBIN AND HEMATOCRIT, BLOOD   Result Value Ref Range    Hemoglobin 8.9 (L) 13.0 - 17.0 g/dL    Hematocrit 30.0 (L) 40.7 - 50.3 %   HEMOGLOBIN AND HEMATOCRIT, BLOOD   Result Value Ref Range    Hemoglobin 9.1 (L) 13.0 - 17.0 g/dL    Hematocrit 28.3 (L) 40.7 - 50.3 %   HEMOGLOBIN AND HEMATOCRIT, BLOOD   Result Value Ref Range    Hemoglobin 9.6 (L) 13.0 - 17.0 g/dL    Hematocrit 30.8 (L) 40.7 - 50.3 %   HEMOGLOBIN AND HEMATOCRIT, BLOOD   Result Value Ref Range    Hemoglobin 8.6 (L) 13.0 - 17.0 g/dL    Hematocrit 27.5 (L) 40.7 - 50.3 %   Basic Metabolic Panel w/ Reflex to MG   Result Value Ref Range    Glucose 107 (H) 70 - 99 mg/dL    BUN 13 8 - 23 mg/dL    CREATININE 0.51 (L) 0.70 - 1.20 mg/dL    Bun/Cre Ratio NOT REPORTED 9 - 20    Calcium 7.9 (L) 8.6 - 10.4 mg/dL    Sodium 136 135 - 144 mmol/L    Potassium 3.6 (L) 3.7 - 5.3 mmol/L    Chloride 103 98 - 107 mmol/L    CO2 20 20 - 31 mmol/L    Anion Gap 13 9 - 17 mmol/L    GFR Non-African American >60 >60 Atrial Rate 73 BPM    QRS Duration 94 ms    Q-T Interval 364 ms    QTc Calculation (Bazett) 445 ms    R Axis 74 degrees    T Axis 49 degrees   EKG 12 Lead   Result Value Ref Range    Ventricular Rate 128 BPM    Atrial Rate 127 BPM    QRS Duration 92 ms    Q-T Interval 402 ms    QTc Calculation (Bazett) 586 ms    R Axis 77 degrees    T Axis 65 degrees   EKG 12 Lead   Result Value Ref Range    Ventricular Rate 100 BPM    Atrial Rate 119 BPM    QRS Duration 90 ms    Q-T Interval 364 ms    QTc Calculation (Bazett) 469 ms    R Axis 76 degrees    T Axis 56 degrees   TYPE AND SCREEN   Result Value Ref Range    Expiration Date 07/31/2018     Arm Band Number [id number]     ABO/Rh O NEGATIVE     Antibody Screen NEGATIVE     Unit Number [id number]     Product Code Leukocyte Reduced Irradiated Red Cell     Unit Divison 0     Dispense Status TRANSFUSED     Transfusion Status OK TO TRANSFUSE     Crossmatch Result COMPATIBLE     Unit Number [id number]     Product Code Leukocyte Reduced Red Cell     Unit Divison 0     Dispense Status REL FROM [facility]     Transfusion Status OK TO TRANSFUSE     Crossmatch Result COMPATIBLE     Unit Number [id number]     Product Code Leukocyte Reduced Red Cell     Unit Divison 0     Dispense Status REL FROM [facility]     Transfusion Status OK TO TRANSFUSE     Crossmatch Result COMPATIBLE    Blood Bank Specimen   Result Value Ref Range    Blood Bank Specimen NOT REPORTED        No results found. PHYSICAL EXAMINATION:  Due to this being a TeleHealth encounter, evaluation of the following organ systems is limited: Vitals/Constitutional/EENT/Resp/CV/GI//MS/Neuro/Skin/Heme-Lymph-Imm. Constitutional: [x] Appears well-developed and well-nourished.      [x] Abnormal  Mental status  [x] Alert and awake  [x] Oriented to person/place/time [x]Able to follow commands    [x] No apparent distress      Eyes:  EOM    [x]  Normal  [] Abnormal-  Sclera  [x]  Normal  [] Abnormal -         Discharge [x]  None the patient's risk of exposure to COVID-19 and provide continuity of care for an established patient. Services were provided through a video synchronous discussion virtually to substitute for in-person clinic visit.     _______________________________________________________________________________________________________________________________________________  FOR TELEPHONE VISITS PLEASE COMPLETE THE FOLLOWING      Consent:  He and/or health care decision maker is aware that that he may receive a bill for this telephone service, depending on his insurance coverage, and has provided verbal consent to proceed: Yes      I affirm this is a Patient Initiated Episode with an Established Patient who has not had a related appointment within my department in the past 7 days or scheduled within the next 24 hours. Total Time: minutes: 21-30 minutes    Note: not billable if this call serves to triage the patient into an appointment for the relevant concern  Immunization   Immunization History   Administered Date(s) Administered    Pneumococcal Polysaccharide ([id number]) 03/01/2015        Pneumococcal Vaccine     [x] Up to date    [] Indicated   [] Refused  [] Contraindicated       Influenza Vaccine   [x] Up to date    [] Indicated   [] Refused  [] Contraindicated       Sleep Medicine 10/16/2020   Sitting and reading 0   Watching TV 3   Sitting, inactive in a public place (e.g. 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 No additional abnormality was detected.     Review of Systems -  General ROS: negative for - chills, fatigue, fever or weight loss  ENT ROS: negative for - headaches, oral lesions or sore throat  Cardiovascular ROS: no chest pain , orthopnea or pnd   Gastrointestinal ROS: no abdominal pain, change in bowel habits, or black or bloody stools  Skin - no rash   Neuro - no blurry vision , no loc . No focal weakness   msk - no jt tenderness or swelling    Vascular - no claudication , rest completed and negative   Lymphatic - complete and negative   Hematology - oncology - complete and negative   Allergy immunology - complete and negative    no burning or hematuria   Upper Extremities  Lower Extremities    Medications were reviewed  Current Outpatient Medications   Medication Sig Dispense Refill    albuterol-ipratropium (COMBIVENT RESPIMAT)  MCG/ACT AERS inhaler INHALE 1 PUFF 4 TIMES A DAY AS DIRECTED 4 g 11    COMBIVENT RESPIMAT  MCG/ACT AERS inhaler INHALE 1 PUFF BY MOUTH EVERY SIX HOURS  4 g 0    metoprolol succinate (TOPROL XL) 25 MG extended release tablet Take 2 tablets by mouth daily 30 tablet 3    tamsulosin (FLOMAX) 0.4 MG capsule Take 0.3 mg by mouth daily       ELIQUIS 2.5 MG TABS tablet TAKE 1 TABLET BY MOUTH TWO TIMES A DAY      mirtazapine (REMERON) 15 MG tablet TAKE 1 TABLET BY MOUTH AT BEDTIME      montelukast (SINGULAIR) 10 MG tablet TAKE 1 TABLET BY MOUTH ONE TIME A DAY      SYMBICORT 160-4.5 MCG/ACT AERO INHALE 2 PUFFS 2 TIMES A DAY (Patient not taking: Reported on 10/16/2020) 1 Inhaler 11    pantoprazole (PROTONIX) 40 MG tablet Take 1 tablet by mouth 2 times daily for 14 days (Patient not taking: Reported on 10/16/2020) 28 tablet 0    donepezil (ARICEPT) 10 MG tablet Take 1 tablet by mouth nightly (Patient not taking: Reported on 10/16/2020) 30 tablet 3    zolpidem (AMBIEN) 10 MG tablet Take 10 mg by mouth nightly as needed for Sleep. Gato Mendoza calcium carbonate 1500 (600 Ca) MG TABS tablet Take 1 tablet by mouth daily (Patient not taking: Reported on 10/16/2020) 60 tablet 3    vitamin D GASTROINTESTINAL ENDOSCOPY N/A 2018    EGD ESOPHAGOGASTRODUODENOSCOPY performed by Elise Wolf MD at [address] Bilateral 2014    Bilateral temporal artery biopsy     Social History     Tobacco Use   Smoking Status Former Smoker    Packs/day: 0.25    Years: 24.00    Pack years: 6.00    Start date: 1946    Last attempt to quit: 1970    Years since quittin.8   Smokeless Tobacco Never Used         PHYSICAL EXAM:   General Appearance:    Alert, cooperative, no distress, appears stated age, Elderly, not overweight     Head:    Normocephalic, without obvious abnormality, atraumatic      Neck:   Supple, symmetrical, trachea midline, no adenopathy;     thyroid:  no enlargement/tenderness/nodules; no carotid    bruit or JVD     Back:     Symmetric, no curvature, ROM normal, no CVA tenderness     Lungs:     Clear to auscultation bilaterally, respirations unlabored no     dullness no bb, no wheezing no rales, vent all lobes,     diaphram motion normal Along expiration     Chest Wall:    No tenderness or deformity      Heart:    Regular rate and rhythm, S1 and S2 normal, no murmur, rub     or gallop no rvh, P2 normal.  Irregular rhythm due to atrial fibrillation                           Abdomen:                              Pulses:                                 Lymph nodes:                    Neurologic:                  Soft, non-tender, bowel sounds active all four quadrants,     no masses, no organomegaly     2+ and symmetric all extremities      Cervical, supraclavicular not enlarged or matted or tender      CNII-XII intact, normal strength 5/5 . Sensation grossly normal  and reflexes normal 2+ throughout        Extremities - Clubbing - no    Edema - no    DVT - no    Skin changes - no    Pulses normal       Musculoskeletal - no joint swelling or tenderness or synovitis        There were no vitals taken for this visit.     Results for orders placed or performed during the hospital encounter of 07/25/18   CULTURE BLOOD #1    Specimen: Blood   Result Value Ref Range    Specimen Description . BLOOD     Special Requests LT FA 10ML     Culture NO GROWTH 6 DAYS     Status FINAL 07/31/2018    CULTURE BLOOD #2    Specimen: Blood   Result Value Ref Range    Specimen Description . BLOOD     Special Requests R FA 3ML     Culture NO GROWTH 6 DAYS     Status FINAL 07/31/2018    Legionella antigen, urine    Specimen: Urine, clean catch   Result Value Ref Range    Specimen Description . CLEAN CATCH URINE     Special Requests NOT REPORTED     Direct Exam       Urine negative for L. pneumophilia serogroup 1 antigen. Infection due to    Direct Exam        Legionella cannot be ruled out since (1) other L. pneumophilia serogroups and    Direct Exam        other Legionella species may cause disease, (2) antigen may not be present in    Direct Exam        early infection (<3 days). and (3) the level of antigen present in the urine    Direct Exam  may be below the detectable levels of this test.     Status FINAL 07/26/2018    Strep Pneumoniae Antigen    Specimen: Urine, clean catch   Result Value Ref Range    Specimen Description . CLEAN CATCH URINE     Special Requests NOT REPORTED     Direct Exam NEGATIVE: Strep pneumoniae antigen not detected     Status FINAL 07/26/2018    Comprehensive Metabolic Panel   Result Value Ref Range    Glucose 109 (H) 70 - 99 mg/dL    BUN 25 (H) 8 - 23 mg/dL    CREATININE 0.62 (L) 0.70 - 1.20 mg/dL    Bun/Cre Ratio NOT REPORTED 9 - 20    Calcium 8.8 8.6 - 10.4 mg/dL    Sodium 134 (L) 135 - 144 mmol/L    Potassium 5.1 3.7 - 5.3 mmol/L    Chloride 96 (L) 98 - 107 mmol/L    CO2 23 20 - 31 mmol/L    Anion Gap 15 9 - 17 mmol/L    Alkaline Phosphatase 111 40 - 129 U/L    ALT 15 5 - 41 U/L    AST 30 <40 U/L    Total Bilirubin 0.71 0.3 - 1.2 mg/dL    Total Protein 7.2 6.4 - 8.3 g/dL    Alb 3.4 (L) 3.5 - 5.2 g/dL    Albumin/Globulin Ratio 0.9 (L) 1.0 - 2.5    GFR Non- >60 >60 mL/min    GFR African American >60 >60 mL/min    GFR Comment          GFR Staging NOT REPORTED    CBC   Result Value Ref Range    WBC 11.3 3.5 - 11.3 k/uL    RBC 4.65 4.21 - 5.77 m/uL    Hemoglobin 12.9 (L) 13.0 - 17.0 g/dL    Hematocrit 41.4 40.7 - 50.3 %    MCV 89.0 82.6 - 102.9 fL    MCH 27.7 25.2 - 33.5 pg    MCHC 31.2 28.4 - 34.8 g/dL    RDW 15.0 (H) 11.8 - 14.4 %    Platelets 295 809 - 966 k/uL    MPV 11.0 8.1 - 13.5 fL    NRBC Automated 0.0 0.0 per 100 WBC   Lactate, Sepsis   Result Value Ref Range    Lactic Acid, Sepsis NOT REPORTED 0.5 - 1.9 mmol/L    Lactic Acid, Sepsis, Whole Blood 1.5 0.5 - 1.9 mmol/L   PROTIME-INR   Result Value Ref Range    Protime 15.4 (H) 9.0 - 12.0 sec    INR 1.5    APTT   Result Value Ref Range    PTT 38.4 (H) 20.5 - 30.5 sec   Troponin   Result Value Ref Range    Troponin T <0.03 <0.03 ng/mL    Troponin Interp         Basic Metabolic Panel w/ Reflex to MG   Result Value Ref Range    Glucose 92 70 - 99 mg/dL    BUN 23 8 - 23 mg/dL    CREATININE 0.72 0.70 - 1.20 mg/dL    Bun/Cre Ratio NOT REPORTED 9 - 20    Calcium 8.2 (L) 8.6 - 10.4 mg/dL    Sodium 137 135 - 144 mmol/L    Potassium 4.5 3.7 - 5.3 mmol/L    Chloride 99 98 - 107 mmol/L    CO2 26 20 - 31 mmol/L    Anion Gap 12 9 - 17 mmol/L    GFR Non-African American >60 >60 mL/min    GFR African American >60 >60 mL/min    GFR Comment          GFR Staging NOT REPORTED    CBC auto differential   Result Value Ref Range    WBC 8.5 3.5 - 11.3 k/uL    RBC 4.22 4.21 - 5.77 m/uL    Hemoglobin 11.7 (L) 13.0 - 17.0 g/dL    Hematocrit 37.9 (L) 40.7 - 50.3 %    MCV 89.8 82.6 - 102.9 fL    MCH 27.7 25.2 - 33.5 pg    MCHC 30.9 28.4 - 34.8 g/dL    RDW 15.1 (H) 11.8 - 14.4 %    Platelets 916 273 - 671 k/uL    MPV 10.3 8.1 - 13.5 fL    NRBC Automated 0.0 0.0 per 100 WBC    Differential Type NOT REPORTED     WBC Morphology NOT REPORTED     RBC Morphology NOT REPORTED     Platelet Estimate NOT REPORTED     Immature Granulocytes 1 (H) 0 %    Seg Neutrophils 80 (H) 36 - 65 %    Lymphocytes 7 (L) 24 - 43 %    Monocytes 9 3 - 12 %    Eosinophils % 2 1 - 4 %    Basophils 1 0 - 2 %    Absolute Immature Granulocyte 0.09 0.00 - 0.30 k/uL    Segs Absolute 6.78 1.50 - 8.10 k/uL    Absolute Lymph # 0.60 (L) 1.10 - 3.70 k/uL    Absolute Mono # 0.77 0.10 - 1.20 k/uL    Absolute Eos # 0.17 0.00 - 0.44 k/uL    Basophils Absolute 0.09 0.00 - 0.20 k/uL    Morphology ANISOCYTOSIS PRESENT    Procalcitonin   Result Value Ref Range    Procalcitonin 0.04 <0.09 ng/mL   Sedimentation Rate   Result Value Ref Range    Sed Rate 28 (H) 0 - 10 mm   C-Reactive Protein   Result Value Ref Range    .6 (H) 0.0 - 5.0 mg/L   Vitamin D 25 Hydroxy   Result Value Ref Range    Vit D, 25-Hydroxy 17.5 (L) 30.0 - 100.0 ng/mL   Basic Metabolic Panel w/ Reflex to MG   Result Value Ref Range    Glucose 88 70 - 99 mg/dL    BUN 18 8 - 23 mg/dL    CREATININE 0.62 (L) 0.70 - 1.20 mg/dL    Bun/Cre Ratio NOT REPORTED 9 - 20    Calcium 8.0 (L) 8.6 - 10.4 mg/dL    Sodium 134 (L) 135 - 144 mmol/L    Potassium 3.9 3.7 - 5.3 mmol/L    Chloride 97 (L) 98 - 107 mmol/L    CO2 22 20 - 31 mmol/L    Anion Gap 15 9 - 17 mmol/L    GFR Non-African American >60 >60 mL/min    GFR African American >60 >60 mL/min    GFR Comment          GFR Staging NOT REPORTED    CBC auto differential   Result Value Ref Range    WBC 10.4 3.5 - 11.3 k/uL    RBC 4.28 4.21 - 5.77 m/uL    Hemoglobin 11.8 (L) 13.0 - 17.0 g/dL    Hematocrit 38.1 (L) 40.7 - 50.3 %    MCV 89.0 82.6 - 102.9 fL    MCH 27.6 25.2 - 33.5 pg    MCHC 31.0 28.4 - 34.8 g/dL    RDW 14.8 (H) 11.8 - 14.4 %    Platelets 201 101 - 200 k/uL    MPV 10.4 8.1 - 13.5 fL    NRBC Automated 0.0 0.0 per 100 WBC    Differential Type NOT REPORTED     WBC Morphology NOT REPORTED     RBC Morphology NOT REPORTED     Platelet Estimate NOT REPORTED     Immature Granulocytes 0 0 %    Seg Neutrophils 88 (H) 36 - 66 %    Lymphocytes 3 (L) 24 - 44 %    Monocytes 7 1 - 7 %    Eosinophils % 2 1 - 4 %    Basophils 0 0 - 2 %    Absolute Immature Granulocyte 0.00 0.00 - 0.30 k/uL    Segs Absolute 9.15 (H) 1.8 - 7.7 k/uL    Absolute Lymph # 0.31 (L) 1.0 - 4.8 k/uL    Absolute Mono # 0.73 0.1 - 0.8 k/uL    Absolute Eos # 0.21 0.0 - 0.4 k/uL    Basophils Absolute 0.00 0.0 - 0.2 k/uL    Morphology ANISOCYTOSIS PRESENT    HEMOGLOBIN AND HEMATOCRIT, BLOOD   Result Value Ref Range    Hemoglobin 10.4 (L) 13.0 - 17.0 g/dL    Hematocrit 32.7 (L) 40.7 - 50.3 %   HEMOGLOBIN AND HEMATOCRIT, BLOOD   Result Value Ref Range    Hemoglobin 7.8 (L) 13.0 - 17.0 g/dL    Hematocrit 25.0 (L) 40.7 - 50.3 %   HEMOGLOBIN AND HEMATOCRIT, BLOOD   Result Value Ref Range    Hemoglobin 9.2 (L) 13.0 - 17.0 g/dL    Hematocrit 30.3 (L) 40.7 - 50.3 %   PROTIME-INR   Result Value Ref Range    Protime 13.5 (H) 9.0 - 12.0 sec    INR 1.3    Troponin   Result Value Ref Range    Troponin T <0.03 <0.03 ng/mL    Troponin Interp         Troponin   Result Value Ref Range    Troponin T <0.03 <0.03 ng/mL    Troponin Interp         Troponin   Result Value Ref Range    Troponin T <0.03 <0.03 ng/mL    Troponin Interp         Basic Metabolic Panel w/ Reflex to MG   Result Value Ref Range    Glucose 100 (H) 70 - 99 mg/dL    BUN 30 (H) 8 - 23 mg/dL    CREATININE 0.71 0.70 - 1.20 mg/dL    Bun/Cre Ratio NOT REPORTED 9 - 20    Calcium 8.0 (L) 8.6 - 10.4 mg/dL    Sodium 138 135 - 144 mmol/L    Potassium 4.3 3.7 - 5.3 mmol/L    Chloride 103 98 - 107 mmol/L    CO2 23 20 - 31 mmol/L    Anion Gap 12 9 - 17 mmol/L    GFR Non-African American >60 >60 mL/min    GFR African American >60 >60 mL/min    GFR Comment          GFR Staging NOT REPORTED    Magnesium   Result Value Ref Range    Magnesium 1.7 1.6 - 2.6 mg/dL   Phosphorus   Result Value Ref Range    Phosphorus 2.9 2.5 - 4.5 mg/dL   SPECIMEN REJECTION   Result Value Ref Range    Specimen Source . BLOOD     Ordered Test [place]     Reason for Rejection       Unable to perform testing: Specimen quantity not sufficient.     - NOT REPORTED    Hemoglobin and Hematocrit, Blood   Result Value Ref Range    Hemoglobin 9.9 (L) 13.0 - 17.0 g/dL    Hematocrit 31.1 (L) 40.7 - 50.3 %   Basic Metabolic Panel w/ Reflex to MG   Result Value Ref Range    Glucose 83 70 - 99 mg/dL    BUN 15 8 - 23 mg/dL    CREATININE 0.62 (L) 0.70 - 1.20 mg/dL    Bun/Cre Ratio NOT REPORTED 9 - 20    Calcium 7.8 (L) 8.6 - 10.4 mg/dL    Sodium 136 135 - 144 mmol/L    Potassium 3.8 3.7 - 5.3 mmol/L    Chloride 102 98 - 107 mmol/L    CO2 21 20 - 31 mmol/L    Anion Gap 13 9 - 17 mmol/L    GFR Non-African American >60 >60 mL/min    GFR African American >60 >60 mL/min    GFR Comment          GFR Staging NOT REPORTED    HEMOGLOBIN AND HEMATOCRIT, BLOOD   Result Value Ref Range    Hemoglobin 8.9 (L) 13.0 - 17.0 g/dL    Hematocrit 30.0 (L) 40.7 - 50.3 %   HEMOGLOBIN AND HEMATOCRIT, BLOOD   Result Value Ref Range    Hemoglobin 9.1 (L) 13.0 - 17.0 g/dL    Hematocrit 28.3 (L) 40.7 - 50.3 %   HEMOGLOBIN AND HEMATOCRIT, BLOOD   Result Value Ref Range    Hemoglobin 9.6 (L) 13.0 - 17.0 g/dL    Hematocrit 30.8 (L) 40.7 - 50.3 %   HEMOGLOBIN AND HEMATOCRIT, BLOOD   Result Value Ref Range    Hemoglobin 8.6 (L) 13.0 - 17.0 g/dL    Hematocrit 27.5 (L) 40.7 - 50.3 %   Basic Metabolic Panel w/ Reflex to MG   Result Value Ref Range    Glucose 107 (H) 70 - 99 mg/dL    BUN 13 8 - 23 mg/dL    CREATININE 0.51 (L) 0.70 - 1.20 mg/dL    Bun/Cre Ratio NOT REPORTED 9 - 20    Calcium 7.9 (L) 8.6 - 10.4 mg/dL    Sodium 136 135 - 144 mmol/L    Potassium 3.6 (L) 3.7 - 5.3 mmol/L    Chloride 103 98 - 107 mmol/L    CO2 20 20 - 31 mmol/L    Anion Gap 13 9 - 17 mmol/L    GFR Non-African American >60 >60 mL/min    GFR African American >60 >60 mL/min    GFR Comment          GFR Staging NOT REPORTED    HEMOGLOBIN AND HEMATOCRIT, BLOOD   Result Value Ref Range    Hemoglobin 9.0 (L) 13.0 - 17.0 g/dL    Hematocrit 29.6 (L) 40.7 - 50.3 %   HEMOGLOBIN AND HEMATOCRIT, BLOOD   Result Value Ref Range    Hemoglobin 8.9 (L) 13.0 - 17.0 g/dL    Hematocrit 28.6 (L) 40.7 - 50.3 %   HEMOGLOBIN AND HEMATOCRIT, BLOOD   Result Value Ref Range    Hemoglobin 9.1 (L) 13.0 - 17.0 g/dL    Hematocrit 27.5 (L) 40.7 - 50.3 %   Basic Metabolic Panel w/ Reflex to MG   Result Value Ref Range    Glucose 72 70 - 99 mg/dL    BUN 10 8 - 23 mg/dL    CREATININE 0.65 (L) 0.70 - 1.20 mg/dL    Bun/Cre Ratio NOT REPORTED 9 - 20    Calcium 8.1 (L) 8.6 - 10.4 mg/dL    Sodium 135 135 - 144 mmol/L    Potassium 3.7 3.7 - 5.3 mmol/L    Chloride 101 98 - 107 mmol/L    CO2 22 20 - 31 mmol/L    Anion Gap 12 9 - 17 mmol/L    GFR Non-African American >60 >60 mL/min    GFR African American >60 >60 mL/min    GFR Comment          GFR Staging NOT REPORTED    Hemoglobin and Hematocrit, Blood   Result Value Ref Range    Hemoglobin 8.9 (L) 13.0 - 17.0 g/dL    Hematocrit 29.1 (L) 40.7 - 50.3 %   Basic Metabolic Panel w/ Reflex to MG   Result Value Ref Range    Glucose 93 70 - 99 mg/dL    BUN 5 (L) 8 - 23 mg/dL    CREATININE 0.54 (L) 0.70 - 1.20 mg/dL    Bun/Cre Ratio NOT REPORTED 9 - 20    Calcium 7.9 (L) 8.6 - 10.4 mg/dL    Sodium 135 135 - 144 mmol/L    Potassium 3.5 (L) 3.7 - 5.3 mmol/L    Chloride 98 98 - 107 mmol/L    CO2 25 20 - 31 mmol/L    Anion Gap 12 9 - 17 mmol/L    GFR Non-African American >60 >60 mL/min    GFR African American >60 >60 mL/min    GFR Comment          GFR Staging NOT REPORTED    Hemoglobin and Hematocrit, Blood   Result Value Ref Range    Hemoglobin 9.1 (L) 13.0 - 17.0 g/dL    Hematocrit 28.1 (L) 40.7 - 50.3 %   Magnesium   Result Value Ref Range    Magnesium 1.6 1.6 - 2.6 mg/dL   EKG 12 Lead   Result Value Ref Range    Ventricular Rate 90 BPM    Atrial Rate 73 BPM    QRS Duration 94 ms    Q-T Interval 364 ms    QTc Calculation (Bazett) 445 ms    R Axis 74 degrees    T Axis 49 degrees   EKG 12 Lead   Result Value Ref Range    Ventricular Rate 128 BPM    Atrial Rate 127 BPM    QRS Duration 92 ms    Q-T Interval 402 ms    QTc Calculation (Bazett) 586 ms R Axis 77 degrees    T Axis 65 degrees   EKG 12 Lead   Result Value Ref Range    Ventricular Rate 100 BPM    Atrial Rate 119 BPM    QRS Duration 90 ms    Q-T Interval 364 ms    QTc Calculation (Bazett) 469 ms    R Axis 76 degrees    T Axis 56 degrees   TYPE AND SCREEN   Result Value Ref Range    Expiration Date 07/31/2018     Arm Band Number [id number]     ABO/Rh O NEGATIVE     Antibody Screen NEGATIVE     Unit Number [id number]     Product Code Leukocyte Reduced Irradiated Red Cell     Unit Divison 0     Dispense Status TRANSFUSED     Transfusion Status OK TO TRANSFUSE     Crossmatch Result COMPATIBLE     Unit Number [id number]     Product Code Leukocyte Reduced Red Cell     Unit Divison 0     Dispense Status REL FROM [facility]     Transfusion Status OK TO TRANSFUSE     Crossmatch Result COMPATIBLE     Unit Number [id number]     Product Code Leukocyte Reduced Red Cell     Unit Divison 0     Dispense Status REL FROM [facility]     Transfusion Status OK TO TRANSFUSE     Crossmatch Result COMPATIBLE    Blood Bank Specimen   Result Value Ref Range    Blood Bank Specimen NOT REPORTED        Xray Chest:  No new chest x-ray    CT Scan:      Echocardiogram:      Cultures:      PFT:  No new PFT      ASSESSMENT:  COPD  Atrial fibrillation  Anticoagulation  PLAN:  Patient's COPD is under good control with the present medications. 3.  Continue bronchodilators as before. The no evidence of an acute exacerbation. Other no evidence of any chest infection. No history of any angina, congestive heart failure. He very likely has cor pulmonale but no clinical heart failure. He has not noted any pulmonary aspiration. He has atrial fibrillation which is rate controlled. He is on anticoagulation to prevent any qpocnpo-[id number] process. 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 Patient did not need any prescriptions.     He already have had his Pneumovax and influenza vaccines. He is being screened for lung cancer along with for any metastatic disease in the laryngeal cancer. Electronically signed by Adria Rhodes MD on 10/16/2020 at 12:05 PM    Please note that this chart was generated using voice recognition Dragon dictation software. Although every effort was made to ensure the accuracy of this automated transcription, some errors in transcription may have occurred.

## 2020-11-03 PROBLEM — J18.9 COMMUNITY ACQUIRED PNEUMONIA OF RIGHT LOWER LOBE OF LUNG: Status: RESOLVED | Noted: 2018-07-25 | Resolved: 2020-11-03

## 2021-01-21 ENCOUNTER — VIRTUAL VISIT (OUTPATIENT)
Dept: PULMONOLOGY | Age: 86
End: 2021-01-21
Payer: MEDICARE

## 2021-01-21 DIAGNOSIS — Z79.01 ANTICOAGULATED: ICD-10-CM

## 2021-01-21 DIAGNOSIS — J20.1 ACUTE BRONCHITIS DUE TO HAEMOPHILUS INFLUENZAE: ICD-10-CM

## 2021-01-21 DIAGNOSIS — J20.0 ACUTE BRONCHITIS DUE TO MYCOPLASMA PNEUMONIAE: ICD-10-CM

## 2021-01-21 DIAGNOSIS — I48.20 CHRONIC ATRIAL FIBRILLATION (HCC): ICD-10-CM

## 2021-01-21 DIAGNOSIS — J42 CHRONIC BRONCHITIS, UNSPECIFIED CHRONIC BRONCHITIS TYPE (HCC): Primary | ICD-10-CM

## 2021-01-21 PROCEDURE — 99204 OFFICE O/P NEW MOD 45 MIN: CPT | Performed by: INTERNAL MEDICINE

## 2021-01-21 PROCEDURE — G8427 DOCREV CUR MEDS BY ELIG CLIN: HCPCS | Performed by: INTERNAL MEDICINE

## 2021-01-21 PROCEDURE — 4040F PNEUMOC VAC/ADMIN/RCVD: CPT | Performed by: INTERNAL MEDICINE

## 2021-01-21 PROCEDURE — 1123F ACP DISCUSS/DSCN MKR DOCD: CPT | Performed by: INTERNAL MEDICINE

## 2021-01-21 NOTE — PROGRESS NOTES
pantoprazole (PROTONIX) 40 MG tablet Take 1 tablet by mouth 2 times daily for 14 days  Patient not taking: Reported on 10/16/2020  [facility], MD   donepezil (ARICEPT) 10 MG tablet Take 1 tablet by mouth nightly  Patient not taking: Reported on 10/16/2020  [facility], MD   zolpidem (AMBIEN) 10 MG tablet Take 10 mg by mouth nightly as needed for Sleep. [facility] Historical Provider, MD   calcium carbonate 1500 (600 Ca) MG TABS tablet Take 1 tablet by mouth daily  Patient not taking: Reported on 10/16/2020  Daily Briceño MD   vitamin D (ERGOCALCIFEROL) 72585 units CAPS capsule Take 1 capsule by mouth once a week for 11 doses  Patient not taking: Reported on 10/16/2020  Daily Briceño MD   colchicine (COLCRYS) 0.6 MG tablet Take 1 tablet by mouth 2 times daily  Patient not taking: Reported on 10/16/2020  Maeve Slater DPM   ipratropium (ATROVENT) 0.06 % nasal spray Pt does not take  Historical Provider, MD   dextromethorphan-guaiFENesin (ROBITUSSIN-DM)  MG/5ML syrup Take 10 mLs by mouth every 6 hours as needed for Cough. Patient not taking: Reported on 10/16/2020  Leon Beauchamp MD   tamsulosin (FLOMAX) 0.4 MG capsule Take 0.3 mg by mouth daily   Historical Provider, MD       Social History     Tobacco Use    Smoking status: Former Smoker     Packs/day: 0.25     Years: 24.00     Pack years: 6.00     Start date: 1946     Quit date: 1970     Years since quittin.0    Smokeless tobacco: Never Used   Substance Use Topics    Alcohol use: Yes     Alcohol/week: 0.0 standard drinks     Comment: socially    Drug use: No            RECORD REVIEW: Previous medical records were reviewed at today's visit.     Wt Readings from Last 3 Encounters:   19 132 lb (59.9 kg)   18 124 lb (56.2 kg)   18 134 lb 11.2 oz (61.1 kg)       Results for orders placed or performed during the hospital encounter of 18   CULTURE BLOOD #1    Specimen: Blood GFR Comment          GFR Staging NOT REPORTED    CBC   Result Value Ref Range    WBC 11.3 3.5 - 11.3 k/uL    RBC 4.65 4.21 - 5.77 m/uL    Hemoglobin 12.9 (L) 13.0 - 17.0 g/dL    Hematocrit 41.4 40.7 - 50.3 %    MCV 89.0 82.6 - 102.9 fL    MCH 27.7 25.2 - 33.5 pg    MCHC 31.2 28.4 - 34.8 g/dL    RDW 15.0 (H) 11.8 - 14.4 %    Platelets 453 074 - 134 k/uL    MPV 11.0 8.1 - 13.5 fL    NRBC Automated 0.0 0.0 per 100 WBC   Lactate, Sepsis   Result Value Ref Range    Lactic Acid, Sepsis NOT REPORTED 0.5 - 1.9 mmol/L    Lactic Acid, Sepsis, Whole Blood 1.5 0.5 - 1.9 mmol/L   PROTIME-INR   Result Value Ref Range    Protime 15.4 (H) 9.0 - 12.0 sec    INR 1.5    APTT   Result Value Ref Range    PTT 38.4 (H) 20.5 - 30.5 sec   Troponin   Result Value Ref Range    Troponin T <0.03 <0.03 ng/mL    Troponin Interp         Basic Metabolic Panel w/ Reflex to MG   Result Value Ref Range    Glucose 92 70 - 99 mg/dL    BUN 23 8 - 23 mg/dL    CREATININE 0.72 0.70 - 1.20 mg/dL    Bun/Cre Ratio NOT REPORTED 9 - 20    Calcium 8.2 (L) 8.6 - 10.4 mg/dL    Sodium 137 135 - 144 mmol/L    Potassium 4.5 3.7 - 5.3 mmol/L    Chloride 99 98 - 107 mmol/L    CO2 26 20 - 31 mmol/L    Anion Gap 12 9 - 17 mmol/L    GFR Non-African American >60 >60 mL/min    GFR African American >60 >60 mL/min    GFR Comment          GFR Staging NOT REPORTED    CBC auto differential   Result Value Ref Range    WBC 8.5 3.5 - 11.3 k/uL    RBC 4.22 4.21 - 5.77 m/uL    Hemoglobin 11.7 (L) 13.0 - 17.0 g/dL    Hematocrit 37.9 (L) 40.7 - 50.3 %    MCV 89.8 82.6 - 102.9 fL    MCH 27.7 25.2 - 33.5 pg    MCHC 30.9 28.4 - 34.8 g/dL    RDW 15.1 (H) 11.8 - 14.4 %    Platelets 184 513 - 472 k/uL    MPV 10.3 8.1 - 13.5 fL    NRBC Automated 0.0 0.0 per 100 WBC    Differential Type NOT REPORTED     WBC Morphology NOT REPORTED     RBC Morphology NOT REPORTED     Platelet Estimate NOT REPORTED     Immature Granulocytes 1 (H) 0 %    Seg Neutrophils 80 (H) 36 - 65 % Lymphocytes 7 (L) 24 - 43 %    Monocytes 9 3 - 12 %    Eosinophils % 2 1 - 4 %    Basophils 1 0 - 2 %    Absolute Immature Granulocyte 0.09 0.00 - 0.30 k/uL    Segs Absolute 6.78 1.50 - 8.10 k/uL    Absolute Lymph # 0.60 (L) 1.10 - 3.70 k/uL    Absolute Mono # 0.77 0.10 - 1.20 k/uL    Absolute Eos # 0.17 0.00 - 0.44 k/uL    Basophils Absolute 0.09 0.00 - 0.20 k/uL    Morphology ANISOCYTOSIS PRESENT    Procalcitonin   Result Value Ref Range    Procalcitonin 0.04 <0.09 ng/mL   Sedimentation Rate   Result Value Ref Range    Sed Rate 28 (H) 0 - 10 mm   C-Reactive Protein   Result Value Ref Range    .6 (H) 0.0 - 5.0 mg/L   Vitamin D 25 Hydroxy   Result Value Ref Range    Vit D, 25-Hydroxy 17.5 (L) 30.0 - 100.0 ng/mL   Basic Metabolic Panel w/ Reflex to MG   Result Value Ref Range    Glucose 88 70 - 99 mg/dL    BUN 18 8 - 23 mg/dL    CREATININE 0.62 (L) 0.70 - 1.20 mg/dL    Bun/Cre Ratio NOT REPORTED 9 - 20    Calcium 8.0 (L) 8.6 - 10.4 mg/dL    Sodium 134 (L) 135 - 144 mmol/L    Potassium 3.9 3.7 - 5.3 mmol/L    Chloride 97 (L) 98 - 107 mmol/L    CO2 22 20 - 31 mmol/L    Anion Gap 15 9 - 17 mmol/L    GFR Non-African American >60 >60 mL/min    GFR African American >60 >60 mL/min    GFR Comment          GFR Staging NOT REPORTED    CBC auto differential   Result Value Ref Range    WBC 10.4 3.5 - 11.3 k/uL    RBC 4.28 4.21 - 5.77 m/uL    Hemoglobin 11.8 (L) 13.0 - 17.0 g/dL    Hematocrit 38.1 (L) 40.7 - 50.3 %    MCV 89.0 82.6 - 102.9 fL    MCH 27.6 25.2 - 33.5 pg    MCHC 31.0 28.4 - 34.8 g/dL    RDW 14.8 (H) 11.8 - 14.4 %    Platelets 333 092 - 536 k/uL    MPV 10.4 8.1 - 13.5 fL    NRBC Automated 0.0 0.0 per 100 WBC    Differential Type NOT REPORTED     WBC Morphology NOT REPORTED     RBC Morphology NOT REPORTED     Platelet Estimate NOT REPORTED     Immature Granulocytes 0 0 %    Seg Neutrophils 88 (H) 36 - 66 %    Lymphocytes 3 (L) 24 - 44 %    Monocytes 7 1 - 7 %    Eosinophils % 2 1 - 4 % Basophils 0 0 - 2 %    Absolute Immature Granulocyte 0.00 0.00 - 0.30 k/uL    Segs Absolute 9.15 (H) 1.8 - 7.7 k/uL    Absolute Lymph # 0.31 (L) 1.0 - 4.8 k/uL    Absolute Mono # 0.73 0.1 - 0.8 k/uL    Absolute Eos # 0.21 0.0 - 0.4 k/uL    Basophils Absolute 0.00 0.0 - 0.2 k/uL    Morphology ANISOCYTOSIS PRESENT    HEMOGLOBIN AND HEMATOCRIT, BLOOD   Result Value Ref Range    Hemoglobin 10.4 (L) 13.0 - 17.0 g/dL    Hematocrit 32.7 (L) 40.7 - 50.3 %   HEMOGLOBIN AND HEMATOCRIT, BLOOD   Result Value Ref Range    Hemoglobin 7.8 (L) 13.0 - 17.0 g/dL    Hematocrit 25.0 (L) 40.7 - 50.3 %   HEMOGLOBIN AND HEMATOCRIT, BLOOD   Result Value Ref Range    Hemoglobin 9.2 (L) 13.0 - 17.0 g/dL    Hematocrit 30.3 (L) 40.7 - 50.3 %   PROTIME-INR   Result Value Ref Range    Protime 13.5 (H) 9.0 - 12.0 sec    INR 1.3    Troponin   Result Value Ref Range    Troponin T <0.03 <0.03 ng/mL    Troponin Interp         Troponin   Result Value Ref Range    Troponin T <0.03 <0.03 ng/mL    Troponin Interp         Troponin   Result Value Ref Range    Troponin T <0.03 <0.03 ng/mL    Troponin Interp         Basic Metabolic Panel w/ Reflex to MG   Result Value Ref Range    Glucose 100 (H) 70 - 99 mg/dL    BUN 30 (H) 8 - 23 mg/dL    CREATININE 0.71 0.70 - 1.20 mg/dL    Bun/Cre Ratio NOT REPORTED 9 - 20    Calcium 8.0 (L) 8.6 - 10.4 mg/dL    Sodium 138 135 - 144 mmol/L    Potassium 4.3 3.7 - 5.3 mmol/L    Chloride 103 98 - 107 mmol/L    CO2 23 20 - 31 mmol/L    Anion Gap 12 9 - 17 mmol/L    GFR Non-African American >60 >60 mL/min    GFR African American >60 >60 mL/min    GFR Comment          GFR Staging NOT REPORTED    Magnesium   Result Value Ref Range    Magnesium 1.7 1.6 - 2.6 mg/dL   Phosphorus   Result Value Ref Range    Phosphorus 2.9 2.5 - 4.5 mg/dL   SPECIMEN REJECTION   Result Value Ref Range    Specimen Source . BLOOD     Ordered Test [facility]     Reason for Rejection       Unable to perform testing: Specimen quantity not sufficient. Hemoglobin 8.9 (L) 13.0 - 17.0 g/dL    Hematocrit 28.6 (L) 40.7 - 50.3 %   HEMOGLOBIN AND HEMATOCRIT, BLOOD   Result Value Ref Range    Hemoglobin 9.1 (L) 13.0 - 17.0 g/dL    Hematocrit 27.5 (L) 40.7 - 50.3 %   Basic Metabolic Panel w/ Reflex to MG   Result Value Ref Range    Glucose 72 70 - 99 mg/dL    BUN 10 8 - 23 mg/dL    CREATININE 0.65 (L) 0.70 - 1.20 mg/dL    Bun/Cre Ratio NOT REPORTED 9 - 20    Calcium 8.1 (L) 8.6 - 10.4 mg/dL    Sodium 135 135 - 144 mmol/L    Potassium 3.7 3.7 - 5.3 mmol/L    Chloride 101 98 - 107 mmol/L    CO2 22 20 - 31 mmol/L    Anion Gap 12 9 - 17 mmol/L    GFR Non-African American >60 >60 mL/min    GFR African American >60 >60 mL/min    GFR Comment          GFR Staging NOT REPORTED    Hemoglobin and Hematocrit, Blood   Result Value Ref Range    Hemoglobin 8.9 (L) 13.0 - 17.0 g/dL    Hematocrit 29.1 (L) 40.7 - 50.3 %   Basic Metabolic Panel w/ Reflex to MG   Result Value Ref Range    Glucose 93 70 - 99 mg/dL    BUN 5 (L) 8 - 23 mg/dL    CREATININE 0.54 (L) 0.70 - 1.20 mg/dL    Bun/Cre Ratio NOT REPORTED 9 - 20    Calcium 7.9 (L) 8.6 - 10.4 mg/dL    Sodium 135 135 - 144 mmol/L    Potassium 3.5 (L) 3.7 - 5.3 mmol/L    Chloride 98 98 - 107 mmol/L    CO2 25 20 - 31 mmol/L    Anion Gap 12 9 - 17 mmol/L    GFR Non-African American >60 >60 mL/min    GFR African American >60 >60 mL/min    GFR Comment          GFR Staging NOT REPORTED    Hemoglobin and Hematocrit, Blood   Result Value Ref Range    Hemoglobin 9.1 (L) 13.0 - 17.0 g/dL    Hematocrit 28.1 (L) 40.7 - 50.3 %   Magnesium   Result Value Ref Range    Magnesium 1.6 1.6 - 2.6 mg/dL   EKG 12 Lead   Result Value Ref Range    Ventricular Rate 90 BPM    Atrial Rate 73 BPM    QRS Duration 94 ms    Q-T Interval 364 ms    QTc Calculation (Bazett) 445 ms    R Axis 74 degrees    T Axis 49 degrees   EKG 12 Lead   Result Value Ref Range    Ventricular Rate 128 BPM    Atrial Rate 127 BPM    QRS Duration 92 ms    Q-T Interval 402 ms Pulmonary/Chest: [x] Respiratory effort normal.  [x] No visualized signs of difficulty breathing or respiratory distress        [] Abnormal      Musculoskeletal:   [x] Normal range of motion. [x] Normal gait with no signs of ataxia. [x]  No signs of cyanosis of the peripheral portions of extremities. [] Abnormal       Neurological:        [x] Normal cranial nerve (limited exam to video visit) [x] No focal weakness observed       [] Abnormal          Speech       [x] Normal   [] Abnormal     Skin:        [x] No rash on visible skin  [x] Normal  [] Abnormal     Psychiatric:       [x] Normal  [] Abnormal        [x] Normal Mood  [] Anxious appearing        Other Pertinent Exam Findings:       ASSESSMENT:  Carcinoid larynx post resection  COPD  Possible chest infection  Atrial fibrillation    Plan:   1. We will start him on Levaquin 500 mg daily for 5 days  2. He will continue the treatment with anticoagulation  We will order spacer device for administration of resolved and inhalers. He apparently having problems using the MDI by itself. Treatment of atrial fibrillation as before. He already have had a flu vaccine and Pneumovax. To get it covered vaccine    Prescription called in    Dictated with Dr. Kory Lamb MD dictation over thank you      An  electronic signature was used to authenticate this note. --Mayela Gonzalez MD on 1/21/2021 at 11:00 AM    9}    Pursuant to the emergency declaration under the [facility], [facility] waiver authority and the [employer] Act, this Virtual  Visit was conducted, with patient's consent, to reduce the patient's risk of exposure to COVID-19 and provide continuity of care for an established patient. Services were provided through a video synchronous discussion virtually to substitute for in-person clinic visit. _______________________________________________________________________________________________________________________________________________  FOR TELEPHONE VISITS PLEASE COMPLETE THE FOLLOWING      Consent:  He and/or health care decision maker is aware that that he may receive a bill for this telephone service, depending on his insurance coverage, and has provided verbal consent to proceed: Yes      I affirm this is a Patient Initiated Episode with an Established Patient who has not had a related appointment within my department in the past 7 days or scheduled within the next 24 hours.     Total Time: minutes: 21-30 minutes    Note: not billable if this call serves to triage the patient into an appointment for the relevant concern

## 2021-01-31 RX ORDER — LEVOFLOXACIN 500 MG/1
500 TABLET, FILM COATED ORAL DAILY
Qty: 5 TABLET | Refills: 0 | Status: SHIPPED | OUTPATIENT
Start: 2021-01-31 | End: 2021-02-05

## 2021-04-28 ENCOUNTER — HOSPITAL ENCOUNTER (INPATIENT)
Age: 86
LOS: 1 days | Discharge: HOSPICE | DRG: 190 | End: 2021-04-29
Attending: EMERGENCY MEDICINE | Admitting: INTERNAL MEDICINE
Payer: MEDICARE

## 2021-04-28 ENCOUNTER — APPOINTMENT (OUTPATIENT)
Dept: CT IMAGING | Age: 86
DRG: 190 | End: 2021-04-28
Payer: MEDICARE

## 2021-04-28 ENCOUNTER — APPOINTMENT (OUTPATIENT)
Dept: GENERAL RADIOLOGY | Age: 86
DRG: 190 | End: 2021-04-28
Payer: MEDICARE

## 2021-04-28 DIAGNOSIS — E86.0 DEHYDRATION: ICD-10-CM

## 2021-04-28 DIAGNOSIS — R09.02 HYPOXIA: Primary | ICD-10-CM

## 2021-04-28 DIAGNOSIS — F03.90 DEMENTIA WITHOUT BEHAVIORAL DISTURBANCE, UNSPECIFIED DEMENTIA TYPE: ICD-10-CM

## 2021-04-28 LAB
-: NORMAL
ABSOLUTE EOS #: 0 K/UL (ref 0–0.4)
ABSOLUTE IMMATURE GRANULOCYTE: 0.06 K/UL (ref 0–0.3)
ABSOLUTE LYMPH #: 0.38 K/UL (ref 1–4.8)
ABSOLUTE MONO #: 0.32 K/UL (ref 0.1–0.8)
ALBUMIN SERPL-MCNC: 3.3 G/DL (ref 3.5–5.2)
ALBUMIN/GLOBULIN RATIO: 0.8 (ref 1–2.5)
ALLEN TEST: ABNORMAL
ALP BLD-CCNC: 103 U/L (ref 40–129)
ALT SERPL-CCNC: 11 U/L (ref 5–41)
AMMONIA: 55 UMOL/L (ref 16–60)
AMORPHOUS: NORMAL
ANION GAP SERPL CALCULATED.3IONS-SCNC: 9 MMOL/L (ref 9–17)
AST SERPL-CCNC: 19 U/L
BACTERIA: NORMAL
BASOPHILS # BLD: 1 % (ref 0–2)
BASOPHILS ABSOLUTE: 0.06 K/UL (ref 0–0.2)
BILIRUB SERPL-MCNC: 0.99 MG/DL (ref 0.3–1.2)
BILIRUBIN URINE: ABNORMAL
BUN BLDV-MCNC: 34 MG/DL (ref 8–23)
BUN/CREAT BLD: ABNORMAL (ref 9–20)
CALCIUM SERPL-MCNC: 8.7 MG/DL (ref 8.6–10.4)
CARBOXYHEMOGLOBIN: 2.1 % (ref 0–5)
CASTS UA: NORMAL /LPF (ref 0–8)
CHLORIDE BLD-SCNC: 98 MMOL/L (ref 98–107)
CO2: 31 MMOL/L (ref 20–31)
COLOR: ABNORMAL
COMMENT UA: ABNORMAL
CREAT SERPL-MCNC: 0.76 MG/DL (ref 0.7–1.2)
CRYSTALS, UA: NORMAL /HPF
DIFFERENTIAL TYPE: ABNORMAL
EOSINOPHILS RELATIVE PERCENT: 0 % (ref 1–4)
EPITHELIAL CELLS UA: NORMAL /HPF (ref 0–5)
FIO2: ABNORMAL
GFR AFRICAN AMERICAN: >60 ML/MIN
GFR NON-AFRICAN AMERICAN: >60 ML/MIN
GFR SERPL CREATININE-BSD FRML MDRD: ABNORMAL ML/MIN/{1.73_M2}
GFR SERPL CREATININE-BSD FRML MDRD: ABNORMAL ML/MIN/{1.73_M2}
GLUCOSE BLD-MCNC: 152 MG/DL (ref 75–110)
GLUCOSE BLD-MCNC: 187 MG/DL (ref 75–110)
GLUCOSE BLD-MCNC: 61 MG/DL (ref 75–110)
GLUCOSE BLD-MCNC: 70 MG/DL (ref 70–99)
GLUCOSE URINE: NEGATIVE
HCO3 VENOUS: 32.8 MMOL/L (ref 24–30)
HCT VFR BLD CALC: 40.2 % (ref 40.7–50.3)
HEMOGLOBIN: 11.8 G/DL (ref 13–17)
IMMATURE GRANULOCYTES: 1 %
KETONES, URINE: ABNORMAL
LEUKOCYTE ESTERASE, URINE: NEGATIVE
LYMPHOCYTES # BLD: 6 % (ref 24–44)
MCH RBC QN AUTO: 27.3 PG (ref 25.2–33.5)
MCHC RBC AUTO-ENTMCNC: 29.4 G/DL (ref 28.4–34.8)
MCV RBC AUTO: 92.8 FL (ref 82.6–102.9)
METHEMOGLOBIN: ABNORMAL % (ref 0–1.5)
MODE: ABNORMAL
MONOCYTES # BLD: 5 % (ref 1–7)
MORPHOLOGY: ABNORMAL
MUCUS: NORMAL
NEGATIVE BASE EXCESS, VEN: ABNORMAL MMOL/L (ref 0–2)
NITRITE, URINE: NEGATIVE
NOTIFICATION TIME: ABNORMAL
NOTIFICATION: ABNORMAL
NRBC AUTOMATED: 0 PER 100 WBC
O2 DEVICE/FLOW/%: ABNORMAL
O2 SAT, VEN: 98.1 % (ref 60–85)
OTHER OBSERVATIONS UA: NORMAL
OXYHEMOGLOBIN: ABNORMAL % (ref 95–98)
PATIENT TEMP: 37
PCO2, VEN, TEMP ADJ: ABNORMAL MMHG (ref 39–55)
PCO2, VEN: 89.9 (ref 39–55)
PDW BLD-RTO: 16.7 % (ref 11.8–14.4)
PEEP/CPAP: ABNORMAL
PH UA: 5 (ref 5–8)
PH VENOUS: 7.19 (ref 7.32–7.42)
PH, VEN, TEMP ADJ: ABNORMAL (ref 7.32–7.42)
PLATELET # BLD: 202 K/UL (ref 138–453)
PLATELET ESTIMATE: ABNORMAL
PMV BLD AUTO: 10.7 FL (ref 8.1–13.5)
PO2, VEN, TEMP ADJ: ABNORMAL MMHG (ref 30–50)
PO2, VEN: 148 (ref 30–50)
POSITIVE BASE EXCESS, VEN: 2.7 MMOL/L (ref 0–2)
POTASSIUM SERPL-SCNC: 4.8 MMOL/L (ref 3.7–5.3)
PROTEIN UA: ABNORMAL
PSV: ABNORMAL
PT. POSITION: ABNORMAL
RBC # BLD: 4.33 M/UL (ref 4.21–5.77)
RBC # BLD: ABNORMAL 10*6/UL
RBC UA: NORMAL /HPF (ref 0–4)
RENAL EPITHELIAL, UA: NORMAL /HPF
RESPIRATORY RATE: ABNORMAL
SAMPLE SITE: ABNORMAL
SARS-COV-2, RAPID: NOT DETECTED
SEG NEUTROPHILS: 87 % (ref 36–66)
SEGMENTED NEUTROPHILS ABSOLUTE COUNT: 5.58 K/UL (ref 1.8–7.7)
SET RATE: ABNORMAL
SODIUM BLD-SCNC: 138 MMOL/L (ref 135–144)
SPECIFIC GRAVITY UA: 1.02 (ref 1–1.03)
SPECIMEN DESCRIPTION: NORMAL
TEXT FOR RESPIRATORY: ABNORMAL
TOTAL HB: ABNORMAL G/DL (ref 12–16)
TOTAL PROTEIN: 7.2 G/DL (ref 6.4–8.3)
TOTAL RATE: ABNORMAL
TRICHOMONAS: NORMAL
TROPONIN INTERP: ABNORMAL
TROPONIN INTERP: ABNORMAL
TROPONIN T: ABNORMAL NG/ML
TROPONIN T: ABNORMAL NG/ML
TROPONIN, HIGH SENSITIVITY: 26 NG/L (ref 0–22)
TROPONIN, HIGH SENSITIVITY: 30 NG/L (ref 0–22)
TSH SERPL DL<=0.05 MIU/L-ACNC: 2.08 MIU/L (ref 0.3–5)
TURBIDITY: CLEAR
URINE HGB: NEGATIVE
UROBILINOGEN, URINE: NORMAL
VT: ABNORMAL
WBC # BLD: 6.4 K/UL (ref 3.5–11.3)
WBC # BLD: ABNORMAL 10*3/UL
WBC UA: NORMAL /HPF (ref 0–5)
YEAST: NORMAL

## 2021-04-28 PROCEDURE — 87086 URINE CULTURE/COLONY COUNT: CPT

## 2021-04-28 PROCEDURE — 84484 ASSAY OF TROPONIN QUANT: CPT

## 2021-04-28 PROCEDURE — 85025 COMPLETE CBC W/AUTO DIFF WBC: CPT

## 2021-04-28 PROCEDURE — 87635 SARS-COV-2 COVID-19 AMP PRB: CPT

## 2021-04-28 PROCEDURE — 94640 AIRWAY INHALATION TREATMENT: CPT

## 2021-04-28 PROCEDURE — 99284 EMERGENCY DEPT VISIT MOD MDM: CPT

## 2021-04-28 PROCEDURE — 82805 BLOOD GASES W/O2 SATURATION: CPT

## 2021-04-28 PROCEDURE — 82947 ASSAY GLUCOSE BLOOD QUANT: CPT

## 2021-04-28 PROCEDURE — 2580000003 HC RX 258: Performed by: EMERGENCY MEDICINE

## 2021-04-28 PROCEDURE — 2580000003 HC RX 258: Performed by: INTERNAL MEDICINE

## 2021-04-28 PROCEDURE — 84443 ASSAY THYROID STIM HORMONE: CPT

## 2021-04-28 PROCEDURE — 70450 CT HEAD/BRAIN W/O DYE: CPT

## 2021-04-28 PROCEDURE — 94660 CPAP INITIATION&MGMT: CPT

## 2021-04-28 PROCEDURE — 82140 ASSAY OF AMMONIA: CPT

## 2021-04-28 PROCEDURE — 71046 X-RAY EXAM CHEST 2 VIEWS: CPT

## 2021-04-28 PROCEDURE — 81001 URINALYSIS AUTO W/SCOPE: CPT

## 2021-04-28 PROCEDURE — 6360000002 HC RX W HCPCS: Performed by: STUDENT IN AN ORGANIZED HEALTH CARE EDUCATION/TRAINING PROGRAM

## 2021-04-28 PROCEDURE — 2580000003 HC RX 258

## 2021-04-28 PROCEDURE — 93005 ELECTROCARDIOGRAM TRACING: CPT | Performed by: EMERGENCY MEDICINE

## 2021-04-28 PROCEDURE — 99223 1ST HOSP IP/OBS HIGH 75: CPT | Performed by: INTERNAL MEDICINE

## 2021-04-28 PROCEDURE — 80053 COMPREHEN METABOLIC PANEL: CPT

## 2021-04-28 PROCEDURE — 83605 ASSAY OF LACTIC ACID: CPT

## 2021-04-28 PROCEDURE — 1200000000 HC SEMI PRIVATE

## 2021-04-28 PROCEDURE — 6370000000 HC RX 637 (ALT 250 FOR IP): Performed by: EMERGENCY MEDICINE

## 2021-04-28 PROCEDURE — 36415 COLL VENOUS BLD VENIPUNCTURE: CPT

## 2021-04-28 PROCEDURE — 2580000003 HC RX 258: Performed by: STUDENT IN AN ORGANIZED HEALTH CARE EDUCATION/TRAINING PROGRAM

## 2021-04-28 RX ORDER — LEVOFLOXACIN 5 MG/ML
500 INJECTION, SOLUTION INTRAVENOUS EVERY 24 HOURS
Status: DISCONTINUED | OUTPATIENT
Start: 2021-04-28 | End: 2021-04-28

## 2021-04-28 RX ORDER — POLYETHYLENE GLYCOL 3350 17 G/17G
17 POWDER, FOR SOLUTION ORAL DAILY PRN
Status: DISCONTINUED | OUTPATIENT
Start: 2021-04-28 | End: 2021-04-29

## 2021-04-28 RX ORDER — PROMETHAZINE HYDROCHLORIDE 12.5 MG/1
12.5 TABLET ORAL EVERY 6 HOURS PRN
Status: DISCONTINUED | OUTPATIENT
Start: 2021-04-28 | End: 2021-04-29 | Stop reason: HOSPADM

## 2021-04-28 RX ORDER — IPRATROPIUM BROMIDE AND ALBUTEROL SULFATE 2.5; .5 MG/3ML; MG/3ML
1 SOLUTION RESPIRATORY (INHALATION) 4 TIMES DAILY
Status: DISCONTINUED | OUTPATIENT
Start: 2021-04-28 | End: 2021-04-29 | Stop reason: HOSPADM

## 2021-04-28 RX ORDER — TAMSULOSIN HYDROCHLORIDE 0.4 MG/1
0.4 CAPSULE ORAL DAILY
Status: DISCONTINUED | OUTPATIENT
Start: 2021-04-28 | End: 2021-04-29

## 2021-04-28 RX ORDER — DEXTROSE MONOHYDRATE 25 G/50ML
25 INJECTION, SOLUTION INTRAVENOUS ONCE
Status: COMPLETED | OUTPATIENT
Start: 2021-04-28 | End: 2021-04-28

## 2021-04-28 RX ORDER — ACETAMINOPHEN 325 MG/1
650 TABLET ORAL EVERY 4 HOURS PRN
Status: DISCONTINUED | OUTPATIENT
Start: 2021-04-28 | End: 2021-04-29 | Stop reason: HOSPADM

## 2021-04-28 RX ORDER — SODIUM CHLORIDE 9 MG/ML
25 INJECTION, SOLUTION INTRAVENOUS PRN
Status: DISCONTINUED | OUTPATIENT
Start: 2021-04-28 | End: 2021-04-29

## 2021-04-28 RX ORDER — ALBUTEROL SULFATE 2.5 MG/3ML
2.5 SOLUTION RESPIRATORY (INHALATION)
Status: DISCONTINUED | OUTPATIENT
Start: 2021-04-28 | End: 2021-04-29 | Stop reason: HOSPADM

## 2021-04-28 RX ORDER — ONDANSETRON 2 MG/ML
4 INJECTION INTRAMUSCULAR; INTRAVENOUS EVERY 6 HOURS PRN
Status: DISCONTINUED | OUTPATIENT
Start: 2021-04-28 | End: 2021-04-29 | Stop reason: HOSPADM

## 2021-04-28 RX ORDER — SODIUM CHLORIDE 0.9 % (FLUSH) 0.9 %
5-40 SYRINGE (ML) INJECTION EVERY 12 HOURS SCHEDULED
Status: DISCONTINUED | OUTPATIENT
Start: 2021-04-28 | End: 2021-04-29 | Stop reason: HOSPADM

## 2021-04-28 RX ORDER — ACETAMINOPHEN 325 MG/1
650 TABLET ORAL EVERY 6 HOURS PRN
Status: DISCONTINUED | OUTPATIENT
Start: 2021-04-28 | End: 2021-04-29 | Stop reason: HOSPADM

## 2021-04-28 RX ORDER — DEXTROSE AND SODIUM CHLORIDE 5; .45 G/100ML; G/100ML
INJECTION, SOLUTION INTRAVENOUS CONTINUOUS
Status: DISCONTINUED | OUTPATIENT
Start: 2021-04-28 | End: 2021-04-29

## 2021-04-28 RX ORDER — SODIUM CHLORIDE 0.9 % (FLUSH) 0.9 %
5-40 SYRINGE (ML) INJECTION PRN
Status: DISCONTINUED | OUTPATIENT
Start: 2021-04-28 | End: 2021-04-29 | Stop reason: HOSPADM

## 2021-04-28 RX ORDER — MIRTAZAPINE 15 MG/1
15 TABLET, FILM COATED ORAL NIGHTLY
Status: DISCONTINUED | OUTPATIENT
Start: 2021-04-28 | End: 2021-04-29

## 2021-04-28 RX ORDER — SODIUM CHLORIDE 9 MG/ML
1000 INJECTION, SOLUTION INTRAVENOUS CONTINUOUS
Status: DISCONTINUED | OUTPATIENT
Start: 2021-04-28 | End: 2021-04-28

## 2021-04-28 RX ORDER — ACETAMINOPHEN 650 MG/1
650 SUPPOSITORY RECTAL EVERY 6 HOURS PRN
Status: DISCONTINUED | OUTPATIENT
Start: 2021-04-28 | End: 2021-04-29 | Stop reason: HOSPADM

## 2021-04-28 RX ORDER — DEXTROSE MONOHYDRATE 25 G/50ML
INJECTION, SOLUTION INTRAVENOUS
Status: COMPLETED
Start: 2021-04-28 | End: 2021-04-28

## 2021-04-28 RX ORDER — METHYLPREDNISOLONE SODIUM SUCCINATE 125 MG/2ML
40 INJECTION, POWDER, LYOPHILIZED, FOR SOLUTION INTRAMUSCULAR; INTRAVENOUS DAILY
Status: DISCONTINUED | OUTPATIENT
Start: 2021-04-28 | End: 2021-04-29

## 2021-04-28 RX ORDER — LANOLIN ALCOHOL/MO/W.PET/CERES
3 CREAM (GRAM) TOPICAL NIGHTLY PRN
Status: DISCONTINUED | OUTPATIENT
Start: 2021-04-28 | End: 2021-04-29

## 2021-04-28 RX ORDER — DEXTROSE AND SODIUM CHLORIDE 5; .45 G/100ML; G/100ML
INJECTION, SOLUTION INTRAVENOUS
Status: DISCONTINUED
Start: 2021-04-28 | End: 2021-04-29

## 2021-04-28 RX ORDER — GUAIFENESIN DEXTROMETHORPHAN HYDROBROMIDE ORAL SOLUTION 10; 100 MG/5ML; MG/5ML
10 SOLUTION ORAL EVERY 6 HOURS PRN
Status: DISCONTINUED | OUTPATIENT
Start: 2021-04-28 | End: 2021-04-29 | Stop reason: HOSPADM

## 2021-04-28 RX ORDER — METOPROLOL SUCCINATE 50 MG/1
50 TABLET, EXTENDED RELEASE ORAL DAILY
Status: DISCONTINUED | OUTPATIENT
Start: 2021-04-28 | End: 2021-04-29

## 2021-04-28 RX ORDER — PANTOPRAZOLE SODIUM 40 MG/1
40 TABLET, DELAYED RELEASE ORAL 2 TIMES DAILY
Status: DISCONTINUED | OUTPATIENT
Start: 2021-04-28 | End: 2021-04-29

## 2021-04-28 RX ORDER — DONEPEZIL HYDROCHLORIDE 10 MG/1
10 TABLET, FILM COATED ORAL NIGHTLY
Status: DISCONTINUED | OUTPATIENT
Start: 2021-04-28 | End: 2021-04-29

## 2021-04-28 RX ADMIN — DEXTROSE MONOHYDRATE 25 G: 25 INJECTION, SOLUTION INTRAVENOUS at 17:15

## 2021-04-28 RX ADMIN — DEXTROSE AND SODIUM CHLORIDE: 5; 450 INJECTION, SOLUTION INTRAVENOUS at 18:19

## 2021-04-28 RX ADMIN — IPRATROPIUM BROMIDE AND ALBUTEROL SULFATE 1 AMPULE: .5; 3 SOLUTION RESPIRATORY (INHALATION) at 19:43

## 2021-04-28 RX ADMIN — SODIUM CHLORIDE 1000 ML: 9 INJECTION, SOLUTION INTRAVENOUS at 11:44

## 2021-04-28 RX ADMIN — Medication 500 MG: at 18:19

## 2021-04-28 RX ADMIN — SODIUM CHLORIDE, PRESERVATIVE FREE 10 ML: 5 INJECTION INTRAVENOUS at 22:00

## 2021-04-28 RX ADMIN — METHYLPREDNISOLONE SODIUM SUCCINATE 40 MG: 125 INJECTION, POWDER, FOR SOLUTION INTRAMUSCULAR; INTRAVENOUS at 18:19

## 2021-04-28 NOTE — ED NOTES
Confirmed with Dr. Aaron Bolton okay to start bipap despite concern of patient unable to remove bipap due to AMS.       Ezekiel Luna RN  04/28/21 1046

## 2021-04-28 NOTE — PROGRESS NOTES
· Bronchodilator assessment   [x]    Bronchodilator Assessment     FEV1 PREDICTED 0  FEV1 actual: 0    Bronchodilator assessment at level  2  BRONCHODILATOR ASSESSMENT SCORE  Score 1 2 3 4   Breath Sounds   [x]  Clear []  Mild Wheezing with good aeration []  Moderate I/E wheezing with adequate aeration []  Poor Aeration or diffuse wheezing   Respiratory Rate []  Less than 20 [x]  20-25 []  Greater than 25  []  Greater than 35    Dyspnea []  No SOB  [x]  SOB with minimal activity []  Speaking in partial sentences []  Acute/ At rest   Peakflow (asthma) []  80 % or greater predicted/PB  [x]  Unable []  70% or greater predicted/PB  []  Unable []  51%-70% predicted/PB  []  Unable []  Less than 50% predicted/PB  []  Unable due to distress   FEV1 % Predicted []  Greater than 69%  [x]  Unable  []  Less than 50%-69%  []  Unable  []  Less than 35%-49%  []  Unable  []  Less than 35%  []  Unable due to distress       CHITO LYNN                                FEMALE                                  MALE                            FEV1 Predicted Normal Values                        FEV1 Predicted Normal Values          Age                                     Height in Feet and Inches       Age                                     Height in Feet and Inches       4' 11\" 5' 1\" 5' 3\" 5' 5\" 5' 7\" 5' 9\" 5' 11\" 6' 1\"  4' 11\" 5' 1\" 5' 3\" 5' 5\" 5' 7\" 5' 9\" 5' 11\" 6' 1\"   42 - 45 2.49 2.66 2.84 3.03 3.22 3.42 3.62 3.83 42 - 45 2.82 3.03 3.26 3.49 3.72 3.96 4.22 4.47   46 - 49 2.40 2.57 2.76 2.94 3.14 3.33 3.54 3.75 46 - 49 2.70 2.92 3.14 3.37 3.61 3.85 4.10 4.36   50 - 53 2.31 2.48 2.66 2.85 3.04 3.24 3.45 3.66 50 - 53 2.58 2.80 3.02 3.25 3.49 3.73 3.98 4.24   54 - 57 2.21 2.38 2.57 2.75 2.95 3.14 3.35 3.56 54 - 57 2.46 2.67 2.89 3.12 3.36 3.60 3.85 4.11   58 - 61 2.10 2.28 2.46 2.65 2.84 3.04 3.24 3.45 58 - 61 2.32 2.54 2.76 2.99 3.23 3.47 3.72 3.98   62 - 65 1.99 2.17 2.35 2.54 2.73 2.93 3.13 3.34 62 - 65 2.19 2.40 2.62 2.85 3.09 3.33 3. 58 3.84   66 - 69 1.88 2.05 2.23 2.42 2.61 2.81 3.02 3.23 66 - 69 2.04 2.26 2.48 2.71 2.95 3.19 3.44 3.70   70+ 1.82 1.99 2.17 2.36 2.55 2.75 2.95 3.16 70+ 1.97 2.19 2.41 2.64 2.87 3.12 3.37 3.62     Patient has inhalers at home ,whichhe has'nt used due to his dementia. Duoneb QID ordered.

## 2021-04-28 NOTE — ED PROVIDER NOTES
[address] ED  EMERGENCY DEPARTMENT ENCOUNTER    Pt Name: Kristen Mckeon  MRN: [id number]  [place] 10/26/1931  Date of evaluation: 4/28/21  CHIEF COMPLAINT       Chief Complaint   Patient presents with    Dementia     decline in ability to eat and drink over the last two weeks    Fatigue     HISTORY OF PRESENT ILLNESS   HPI  78-year-old male with a history of dementia, COPD not on home oxygen presenting with behavioral changes. He states that he has not slept in several days, is not eating or drinking well. Today, he was unable to get up off the toilet, even with family's assistance. No recent changes to medication. Patient is on anticoagulation for atrial fibrillation. He did fall at home, several days ago, from the couch to the floor. He did not strike his head. REVIEW OF SYSTEMS     Review of Systems   Unable to perform ROS: Dementia     PASTMEDICAL HISTORY     Past Medical History:   Diagnosis Date    Atrial fibrillation [facility]) 5/914 12/13/13  - heart ablation of dysrhythmia    Cancer ([facility] [address]     vocal cords-radiation    COPD (chronic obstructive pulmonary disease) (HCC)     Emphysema of lung (HCC)     Hernia, inguinal, bilateral     rt.     Hypertension      SURGICAL HISTORY       Past Surgical History:   Procedure Laterality Date    APPENDECTOMY      CARDIAC SURGERY Right 5-29-15    inguinal hernia with mesh    CATARACT REMOVAL      COLONOSCOPY      COLONOSCOPY  5/12/15    ulceration at the anastomosis with the small bowel-pathology acute and chronic inflammation, rectal polyp-tubular adenoma    SMALL INTESTINE SURGERY      UPPER GASTROINTESTINAL ENDOSCOPY N/A 7/28/2018    EGD CONTROL HEMORRHAGE performed by Glenn Marroquin MD at [place] [address]  07/30/2018    UPPER GASTROINTESTINAL ENDOSCOPY N/A 7/30/2018    EGD ESOPHAGOGASTRODUODENOSCOPY performed by Glenn Marroquin MD at [address] Bilateral 05/09/2014    Bilateral temporal artery biopsy     CURRENT MEDICATIONS       Previous Medications    ALBUTEROL-IPRATROPIUM (COMBIVENT RESPIMAT)  MCG/ACT AERS INHALER    INHALE 1 PUFF 4 TIMES A DAY AS DIRECTED    CALCIUM CARBONATE 1500 (600 CA) MG TABS TABLET    Take 1 tablet by mouth daily    COLCHICINE (COLCRYS) 0.6 MG TABLET    Take 1 tablet by mouth 2 times daily    COMBIVENT RESPIMAT  MCG/ACT AERS INHALER    INHALE 1 PUFF BY MOUTH EVERY SIX HOURS     DEXTROMETHORPHAN-GUAIFENESIN (ROBITUSSIN-DM)  MG/5ML SYRUP    Take 10 mLs by mouth every 6 hours as needed for Cough. DONEPEZIL (ARICEPT) 10 MG TABLET    Take 1 tablet by mouth nightly    ELIQUIS 2.5 MG TABS TABLET    TAKE 1 TABLET BY MOUTH TWO TIMES A DAY    IPRATROPIUM (ATROVENT) 0.06 % NASAL SPRAY    Pt does not take    METOPROLOL SUCCINATE (TOPROL XL) 25 MG EXTENDED RELEASE TABLET    Take 2 tablets by mouth daily    MIRTAZAPINE (REMERON) 15 MG TABLET    TAKE 1 TABLET BY MOUTH AT BEDTIME    MONTELUKAST (SINGULAIR) 10 MG TABLET    TAKE 1 TABLET BY MOUTH ONE TIME A DAY    PANTOPRAZOLE (PROTONIX) 40 MG TABLET    Take 1 tablet by mouth 2 times daily for 14 days    SYMBICORT 160-4.5 MCG/ACT AERO    INHALE 2 PUFFS 2 TIMES A DAY    TAMSULOSIN (FLOMAX) 0.4 MG CAPSULE    Take 0.3 mg by mouth daily     VITAMIN D (ERGOCALCIFEROL) 44294 UNITS CAPS CAPSULE    Take 1 capsule by mouth once a week for 11 doses    ZOLPIDEM (AMBIEN) 10 MG TABLET    Take 10 mg by mouth nightly as needed for Sleep. .     ALLERGIES     is allergic to codeine; fentanyl; and pcn [penicillins]. FAMILY HISTORY     He indicated that his mother is . He indicated that his father is . He indicated that his sister is . He indicated that his brother is .      SOCIAL HISTORY       Social History     Tobacco Use    Smoking status: Former Smoker     Packs/day: 0.25     Years: 24.00     Pack years: 6.00     Start date: 1946     Quit date: 1970     Years since quittin.3    keep saturation greater than 90%. Will swab for Covid, admit for further management    [AN]   1248 Elevated from prior, likely dehydrated. Low body mass, may not mount large creatinine response to dehydration   Creatinine: 0.76 [AN]   1317 Negative. Will admit to internal medicine   COVID-19, Rapid:    Specimen Description . NASOPHARYNGEAL SWAB   SARS-CoV-2, Rapid Not Detected [AN]      ED Course User Index  [AN] Poncho Cox MD       Accepted by internal medicine resident for attending under Dr. Cony Raygoza. CRITICAL CARE:       PROCEDURES:    Procedures    DIAGNOSTIC RESULTS   EKG:All EKG's are interpreted by the Emergency Department Physician who either signs or Co-signs this chart in the absence of a cardiologist.      RADIOLOGY:All plain film, CT, MRI, and formal ultrasound images (except ED bedside ultrasound) are read by the radiologist, see reports below, unless otherwisenoted in MDM or here. XR CHEST (2 VW)   Final Result   No acute abnormality identified. Bibasilar changes of cystic bronchiectasis redemonstrated. Hyperinflation   redemonstrated related underlying emphysema. CT Head WO Contrast   Final Result   Chronic findings in the brain without acute CT abnormality identified. LABS: All lab results were reviewed by myself, and all abnormals are listed below.   Labs Reviewed   CBC WITH AUTO DIFFERENTIAL - Abnormal; Notable for the following components:       Result Value    Hemoglobin 11.8 (*)     Hematocrit 40.2 (*)     RDW 16.7 (*)     Immature Granulocytes 1 (*)     Seg Neutrophils 87 (*)     Lymphocytes 6 (*)     Eosinophils % 0 (*)     Absolute Lymph # 0.38 (*)     All other components within normal limits   COMPREHENSIVE METABOLIC PANEL - Abnormal; Notable for the following components:    BUN 34 (*)     Albumin 3.3 (*)     Albumin/Globulin Ratio 0.8 (*)     All other components within normal limits   URINALYSIS - Abnormal; Notable for the following components:    Color,

## 2021-04-28 NOTE — ED NOTES
Writer received report from [employer]. Patient is resting on stretcher with even respirations. Pt on 15L NRB. VSS. Daughter at bedside updated.      Du Barajas RN  04/28/21 1942

## 2021-04-28 NOTE — CODE DOCUMENTATION
I had a long discussion with the patient's family in person, principally the daughter who is the legal POA, in the presence of the RN taking care of the patient. Patient's current clinical condition, laboratory and radiographic findings as well as recommendations of physicians consulted on the case were discussed with the patient's family in detail in simple [place]. All questions and concerns of the family were addressed, and appropriate emotional support was provided. After understanding patient's current medical condition, family decided that they wanted to continue the ongoing treatment but in case patient's heart stops (cardiac arrest) or the patient stops breathing (respiratory arrest), they do not want any chest compressions, insertion of a breathing tube down patient's throat for respiratory support or other similar  resuscitative measures to be performed on the patient. They requested that if such a condition arises, the patient should be made comfortable and nature should be allowed to take its course. They asked that patient's code status should be changed to [facility] (no intubation, no cardioversion, no chest compressions), and signed the [facility] order form in my presence, which was witnessed by RN, Butch Francois. Will honor family's wishes, and will change patient's code status to [facility] (no intubation, no cardioversion, no chest compressions). Abril Ernst M.D.   Pager: [phone]  Department of Internal Medicine,  [facility])             4/28/2021, 3:55 PM

## 2021-04-28 NOTE — H&P
[address]     Department of Internal Medicine - Staff Internal Medicine Teaching Service          ADMISSION NOTE/HISTORY AND PHYSICAL EXAMINATION   Date: 4/28/2021  Patient Name: Cassidy Osman  Date of admission: 4/28/2021 10:38 AM  YOB: 1931  PCP: Megan Young MD  History Obtained From:  chart review daughter    [facility]     Chief complaint: Altered mental status, breathing difficulty    HISTORY OF PRESENTING ILLNESS     The patient is a pleasant 80 y.o. male presents with a chief complaint of altered mental status and breathing difficulty. Patient has a history of dementia, COPD not on any home oxygen, hypertension, atrial fibrillation/flutter on Eliquis, bowel resection-partial versus total, duodenal ulcer/GI bleed in 2018    Patient's daughter at bedside, patient unable to give history because of altered mental status. Patient's daughter states that patient's dementia has been getting worse for a long time now, but for past 3 days patient has not been eating or drinking properly, neither sleeping well. Patient has been having breathing difficulty, patient is unable to use inhalers at home as per daughter. Usually his wife helps with his chores, but recently patient has not been able to do pretty much anything, sometimes he seems to hallucinate. He does not drink, quit smoking long back. Patient did have a fall several days ago from couch to the floor but did not strike his head or lose consciousness. No history of fever, chills, chest pain, abdominal pain, nausea, vomiting, urinary or bowel complaints. On arrival to ED patient was hypoxic, saturating in low 80s on room air, patient was put on nasal cannula and switched to nonrebreather later. Currently he is sleeping, barely responding to questions by nodding, saturating well on non-rebreather, 10 L.     Afebrile, blood pressure stable, heart rate in 80s, normal sinus rhythm  Labs show -  Covid negative  Glucose 70  troponins mildly elevated -30  Hemoglobin stable at 11.8  CT head -no acute changes  Chest x-ray shows chronic changes of emphysema/bronchiectasis, no acute findings      Review of Systems:  Unable to obtain  PAST MEDICAL HISTORY     Past Medical History:   Diagnosis Date    Atrial fibrillation [facility]) 5/914 12/13/13  - heart ablation of dysrhythmia    [facility]     vocal cords-radiation    COPD (chronic obstructive pulmonary disease) (HCC)     [facility]     Hernia, inguinal, bilateral     rt.  Hypertension        PAST SURGICAL HISTORY     Past Surgical History:   Procedure Laterality Date    APPENDECTOMY      CARDIAC SURGERY Right 5-29-15    inguinal hernia with mesh    CATARACT REMOVAL      COLONOSCOPY      COLONOSCOPY  5/12/15    ulceration at the anastomosis with the small bowel-pathology acute and chronic inflammation, rectal polyp-tubular adenoma    SMALL INTESTINE SURGERY      UPPER GASTROINTESTINAL ENDOSCOPY N/A 7/28/2018    EGD CONTROL HEMORRHAGE performed by Curt Estrella MD at [address]  07/30/2018    UPPER GASTROINTESTINAL ENDOSCOPY N/A 7/30/2018    EGD ESOPHAGOGASTRODUODENOSCOPY performed by Curt Estrella MD at [facility] Bilateral 05/09/2014    Bilateral temporal artery biopsy       ALLERGIES     Codeine, Fentanyl, and Pcn [penicillins]    MEDICATIONS PRIOR TO ADMISSION     Prior to Admission medications    Medication Sig Start Date End Date Taking?  Authorizing Provider   ELIQUIS 2.5 MG TABS tablet TAKE 1 TABLET BY MOUTH TWO TIMES A DAY 10/5/20   Historical Provider, MD   mirtazapine (REMERON) 15 MG tablet TAKE 1 TABLET BY MOUTH AT BEDTIME 7/28/20   Historical Provider, MD   montelukast (SINGULAIR) 10 MG tablet TAKE 1 TABLET BY MOUTH ONE TIME A DAY 10/5/20   Historical Provider, MD   albuterol-ipratropium (COMBIVENT RESPIMAT)  MCG/ACT AERS inhaler INHALE 1 PUFF 4 TIMES A DAY AS DIRECTED 9/3/20   Derek Melendez MD   COMBIVENT RESPIMAT  MCG/ACT AERS inhaler INHALE 1 PUFF BY MOUTH EVERY SIX HOURS  7/19/20   Derek Melendez MD   SYMBICORT 160-4.5 MCG/ACT AERO INHALE 2 PUFFS 2 TIMES A DAY  Patient not taking: Reported on 10/16/2020 10/2/18   Derek Melendez MD   metoprolol succinate (TOPROL XL) 25 MG extended release tablet Take 2 tablets by mouth daily 8/2/18   Juan M Flores MD   pantoprazole (PROTONIX) 40 MG tablet Take 1 tablet by mouth 2 times daily for 14 days  Patient not taking: Reported on 10/16/2020 8/1/18 8/15/18  Juan M Flores MD   donepezil (ARICEPT) 10 MG tablet Take 1 tablet by mouth nightly  Patient not taking: Reported on 10/16/2020 8/1/18   Juan M Flores MD   zolpidem (AMBIEN) 10 MG tablet Take 10 mg by mouth nightly as needed for Sleep. Blessing Steele Historical Provider, MD   calcium carbonate 1500 (600 Ca) MG TABS tablet Take 1 tablet by mouth daily  Patient not taking: Reported on 10/16/2020 7/19/18   Urmila Gomez MD   vitamin D (ERGOCALCIFEROL) 27756 units CAPS capsule Take 1 capsule by mouth once a week for 11 doses  Patient not taking: Reported on 10/16/2020 7/18/18 9/27/18  Urmila Gomez MD   colchicine (COLCRYS) 0.6 MG tablet Take 1 tablet by mouth 2 times daily  Patient not taking: Reported on 10/16/2020 4/30/18   Dwight Bazan DPM   ipratropium (ATROVENT) 0.06 % nasal spray Pt does not take 11/9/16   Historical Provider, MD   dextromethorphan-guaiFENesin (ROBITUSSIN-DM)  MG/5ML syrup Take 10 mLs by mouth every 6 hours as needed for Cough.   Patient not taking: Reported on 10/16/2020 3/10/15   Joanna Lo MD   tamsulosin (FLOMAX) 0.4 MG capsule Take 0.3 mg by mouth daily     Historical Provider, MD       SOCIAL HISTORY     Tobacco: Quit long time back  Alcohol: Does not drink  Illicits: Denied  Occupation: Did not ask    FAMILY HISTORY     Family History   Problem Relation Age of Onset    Cancer Father         prostate    Cancer Sister         in abdomen    Cancer Brother         brain       PHYSICAL EXAM     Vitals: /74   Pulse 101   Temp 97 °F (36.1 °C) (Oral)   Resp 27   Wt 118 lb (53.5 kg)   SpO2 98%   BMI 18.48 kg/m²   Tmax: Temp (24hrs), Av °F (36.1 °C), Min:97 °F (36.1 °C), Max:97 °F (36.1 °C)    Last Body weight:   Wt Readings from Last 3 Encounters:   21 118 lb (53.5 kg)   19 132 lb (59.9 kg)   18 124 lb (56.2 kg)     Body Mass Index : Body mass index is 18.48 kg/m². PHYSICAL EXAMINATION:  Constitutional: This is a well developed, well nourished, 17-18.4 - Mild malnutrition / Protein energy malnutrition Grade I 80y.o. year old male who is sleeping, barely responding to any questions head:normocephalic and atraumatic. EENT:  PERRLA. No conjunctival injections. Septum was midline, mucosa was without erythema, exudates or cobblestoning. No thrush was noted. Neck: Supple without thyromegaly. No elevated JVP. Trachea was midline. Respiratory: Chest was symmetrical without dullness to percussion. Breath sounds bilaterally were clear to auscultation. Bilateral wheezing present. there is no intercostal retraction or use of accessory muscles. No egophony noted. Cardiovascular: Regular without murmur, clicks, gallops or rubs. Abdomen: Slightly rounded and soft without organomegaly. No rebound, rigidity or guarding was appreciated. Lymphatic: No lymphadenopathy. Musculoskeletal: Normal curvature of the spine. No gross muscle weakness. Extremities:  No lower extremity edema, ulcerations, tenderness, varicosities or erythema. Muscle size, tone and strength are normal.  No involuntary movements are noted. Skin:  Warm and dry. Good color, turgor and pigmentation. No lesions or scars.   No cyanosis or clubbing  Neurological/Psychiatric: Features of dementia      INVESTIGATIONS     Laboratory Testing:     Recent Results (from the past 24 hour(s)) CBC WITH AUTO DIFFERENTIAL    Collection Time: 04/28/21 10:22 AM   Result Value Ref Range    WBC 6.4 3.5 - 11.3 k/uL    RBC 4.33 4.21 - 5.77 m/uL    Hemoglobin 11.8 (L) 13.0 - 17.0 g/dL    Hematocrit 40.2 (L) 40.7 - 50.3 %    MCV 92.8 82.6 - 102.9 fL    MCH 27.3 25.2 - 33.5 pg    MCHC 29.4 28.4 - 34.8 g/dL    RDW 16.7 (H) 11.8 - 14.4 %    Platelets 944 013 - 707 k/uL    MPV 10.7 8.1 - 13.5 fL    NRBC Automated 0.0 0.0 per 100 WBC    Differential Type NOT REPORTED     WBC Morphology NOT REPORTED     RBC Morphology NOT REPORTED     Platelet Estimate NOT REPORTED     Immature Granulocytes 1 (H) 0 %    Seg Neutrophils 87 (H) 36 - 66 %    Lymphocytes 6 (L) 24 - 44 %    Monocytes 5 1 - 7 %    Eosinophils % 0 (L) 1 - 4 %    Basophils 1 0 - 2 %    Absolute Immature Granulocyte 0.06 0.00 - 0.30 k/uL    Segs Absolute 5.58 1.8 - 7.7 k/uL    Absolute Lymph # 0.38 (L) 1.0 - 4.8 k/uL    Absolute Mono # 0.32 0.1 - 0.8 k/uL    Absolute Eos # 0.00 0.0 - 0.4 k/uL    Basophils Absolute 0.06 0.0 - 0.2 k/uL    Morphology ANISOCYTOSIS PRESENT    COMPREHENSIVE METABOLIC PANEL    Collection Time: 04/28/21 10:22 AM   Result Value Ref Range    Glucose 70 70 - 99 mg/dL    BUN 34 (H) 8 - 23 mg/dL    CREATININE 0.76 0.70 - 1.20 mg/dL    Bun/Cre Ratio NOT REPORTED 9 - 20    Calcium 8.7 8.6 - 10.4 mg/dL    Sodium 138 135 - 144 mmol/L    Potassium 4.8 3.7 - 5.3 mmol/L    Chloride 98 98 - 107 mmol/L    CO2 31 20 - 31 mmol/L    Anion Gap 9 9 - 17 mmol/L    Alkaline Phosphatase 103 40 - 129 U/L    ALT 11 5 - 41 U/L    AST 19 <40 U/L    Total Bilirubin 0.99 0.3 - 1.2 mg/dL    Total Protein 7.2 6.4 - 8.3 g/dL    Albumin 3.3 (L) 3.5 - 5.2 g/dL    Albumin/Globulin Ratio 0.8 (L) 1.0 - 2.5    GFR Non-African American >60 >60 mL/min    GFR African American >60 >60 mL/min    GFR Comment          GFR Staging NOT REPORTED    Troponin    Collection Time: 04/28/21 10:22 AM   Result Value Ref Range    Troponin, High Sensitivity 30 (H) 0 - 22 ng/L    Troponin T NOT REPORTED <0.03 ng/mL    Troponin Interp NOT REPORTED    COVID-19, Rapid    Collection Time: 04/28/21 12:40 PM    Specimen: Nasopharyngeal Swab   Result Value Ref Range    Specimen Description . NASOPHARYNGEAL SWAB     SARS-CoV-2, Rapid Not Detected Not Detected   Urinalysis, reflex to microscopic    Collection Time: 04/28/21  1:14 PM   Result Value Ref Range    Color, UA DARK YELLOW (A) YELLOW    Turbidity UA CLEAR CLEAR    Glucose, Ur NEGATIVE NEGATIVE    Bilirubin Urine NEGATIVE  Verified by ictotest. (A) NEGATIVE    Ketones, Urine TRACE (A) NEGATIVE    Specific Gravity, UA 1.022 1.005 - 1.030    Urine Hgb NEGATIVE NEGATIVE    pH, UA 5.0 5.0 - 8.0    Protein, UA TRACE (A) NEGATIVE    Urobilinogen, Urine Normal Normal    Nitrite, Urine NEGATIVE NEGATIVE    Leukocyte Esterase, Urine NEGATIVE NEGATIVE    Urinalysis Comments NOT REPORTED    Microscopic Urinalysis    Collection Time: 04/28/21  1:14 PM   Result Value Ref Range    -          WBC, UA None 0 - 5 /HPF    RBC, UA 0 TO 2 0 - 4 /HPF    Casts UA NOT REPORTED 0 - 8 /LPF    Crystals, UA NOT REPORTED None /HPF    Epithelial Cells UA None 0 - 5 /HPF    Renal Epithelial, UA NOT REPORTED 0 /HPF    Bacteria, UA NOT REPORTED None    Mucus, UA NOT REPORTED None    Trichomonas, UA NOT REPORTED None    Amorphous, UA NOT REPORTED None    Other Observations UA NOT REPORTED NOT REQ. Yeast, UA NOT REPORTED None       Imaging:   Xr Chest (2 Vw)    Result Date: 4/28/2021  No acute abnormality identified. Bibasilar changes of cystic bronchiectasis redemonstrated. Hyperinflation redemonstrated related underlying emphysema. Ct Head Wo Contrast    Result Date: 4/28/2021  Chronic findings in the brain without acute CT abnormality identified. ASSESSMENT & PLAN     ASSESSMENT / PLAN:     Acute hypoxic respiratory failure secondary to COPD exacerbation  Continue bronchodilators as per RT aerosol protocol, start on steroids, Azithromycin. BIPAP as needed.     Altered mental status likely due to hypoxia, hypoglycemia/dehydration with history of dementia  -N.p.o. for now ,continue fluids, start on diet when able  - check TSH, VBG, ammonia    Hyper troponemia  -Get repeat troponins for 2 more occurrences    History of atrial fibrillation/atrial flutter  -S/p ablation in 2013  -Continue beta-blocker, Eliquis    Essential hypertension   continue beta-blocker    Dementia   continue donepezil  -Consider facility or rehab at discharge    History of BPH  -Continue Flomax    Patient kept n.p.o. for now  Code status changed to DNR CCA  DVT ppx: Already on Eliquis  GI ppx: Continue Protonix    PT/OT/SW-ongoing  Discharge Planning: Ongoing    Ranjith Ruiz MD  Internal Medicine Resident, PGY-1  [facility]; [place], [place]  4/28/2021, 4:30 PM    Attestation and add on       I have discussed the care of Viola Peterson , including pertinent history and exam findings,      4/28/21    with the resident. I have seen and examined the patient and the key elements of all parts of the encounter have been performed by me . I agree with the assessment, plan and orders as documented by the resident. --   Patient is ill and symptomatic .   ----    Condition    [x] ill ,     [x] high risk , [] critical ,           [] Iseptic---  [x] delirium     [] -end organ failure ----,        [x] debility---           [x] I-multiorgan insufficiency---        -- ;     [address] [place] [address], [address] [place] [address].    Phone [phone]   Fax: [phone]  Answering Service: [phone]

## 2021-04-29 VITALS
RESPIRATION RATE: 18 BRPM | WEIGHT: 118 LBS | HEART RATE: 67 BPM | TEMPERATURE: 97 F | OXYGEN SATURATION: 93 % | BODY MASS INDEX: 18.48 KG/M2 | DIASTOLIC BLOOD PRESSURE: 50 MMHG | SYSTOLIC BLOOD PRESSURE: 103 MMHG

## 2021-04-29 LAB
ALLEN TEST: ABNORMAL
CARBOXYHEMOGLOBIN: 0.5 % (ref 0–5)
CULTURE: NORMAL
EKG ATRIAL RATE: 89 BPM
EKG Q-T INTERVAL: 374 MS
EKG QRS DURATION: 100 MS
EKG QTC CALCULATION (BAZETT): 455 MS
EKG R AXIS: 90 DEGREES
EKG T AXIS: 16 DEGREES
EKG VENTRICULAR RATE: 89 BPM
FIO2: ABNORMAL
HCO3 VENOUS: 33.9 MMOL/L (ref 24–30)
LACTIC ACID, WHOLE BLOOD: 1.7 MMOL/L (ref 0.7–2.1)
Lab: NORMAL
METHEMOGLOBIN: ABNORMAL % (ref 0–1.5)
MODE: ABNORMAL
NEGATIVE BASE EXCESS, VEN: ABNORMAL MMOL/L (ref 0–2)
NOTIFICATION TIME: ABNORMAL
NOTIFICATION: ABNORMAL
O2 DEVICE/FLOW/%: ABNORMAL
O2 SAT, VEN: 36.1 % (ref 60–85)
OXYHEMOGLOBIN: ABNORMAL % (ref 95–98)
PATIENT TEMP: 37
PCO2, VEN, TEMP ADJ: ABNORMAL MMHG (ref 39–55)
PCO2, VEN: 75.9 (ref 39–55)
PEEP/CPAP: ABNORMAL
PH VENOUS: 7.27 (ref 7.32–7.42)
PH, VEN, TEMP ADJ: ABNORMAL (ref 7.32–7.42)
PO2, VEN, TEMP ADJ: ABNORMAL MMHG (ref 30–50)
PO2, VEN: 25.7 (ref 30–50)
POSITIVE BASE EXCESS, VEN: 5.1 MMOL/L (ref 0–2)
PSV: ABNORMAL
PT. POSITION: ABNORMAL
RESPIRATORY RATE: ABNORMAL
SAMPLE SITE: ABNORMAL
SET RATE: ABNORMAL
SPECIMEN DESCRIPTION: NORMAL
TEXT FOR RESPIRATORY: ABNORMAL
TOTAL HB: ABNORMAL G/DL (ref 12–16)
TOTAL RATE: ABNORMAL
TROPONIN INTERP: ABNORMAL
TROPONIN T: ABNORMAL NG/ML
TROPONIN, HIGH SENSITIVITY: 35 NG/L (ref 0–22)
VT: ABNORMAL

## 2021-04-29 PROCEDURE — 6370000000 HC RX 637 (ALT 250 FOR IP): Performed by: EMERGENCY MEDICINE

## 2021-04-29 PROCEDURE — 6360000002 HC RX W HCPCS: Performed by: STUDENT IN AN ORGANIZED HEALTH CARE EDUCATION/TRAINING PROGRAM

## 2021-04-29 PROCEDURE — 93010 ELECTROCARDIOGRAM REPORT: CPT | Performed by: INTERNAL MEDICINE

## 2021-04-29 PROCEDURE — 99232 SBSQ HOSP IP/OBS MODERATE 35: CPT | Performed by: INTERNAL MEDICINE

## 2021-04-29 PROCEDURE — 94761 N-INVAS EAR/PLS OXIMETRY MLT: CPT

## 2021-04-29 PROCEDURE — 2700000000 HC OXYGEN THERAPY PER DAY

## 2021-04-29 PROCEDURE — 94660 CPAP INITIATION&MGMT: CPT

## 2021-04-29 PROCEDURE — 94640 AIRWAY INHALATION TREATMENT: CPT

## 2021-04-29 PROCEDURE — 2580000003 HC RX 258: Performed by: STUDENT IN AN ORGANIZED HEALTH CARE EDUCATION/TRAINING PROGRAM

## 2021-04-29 PROCEDURE — 99222 1ST HOSP IP/OBS MODERATE 55: CPT | Performed by: NURSE PRACTITIONER

## 2021-04-29 PROCEDURE — 2580000003 HC RX 258

## 2021-04-29 RX ORDER — LORAZEPAM 2 MG/ML
1 INJECTION INTRAMUSCULAR EVERY 4 HOURS
Status: DISCONTINUED | OUTPATIENT
Start: 2021-04-29 | End: 2021-04-29 | Stop reason: HOSPADM

## 2021-04-29 RX ORDER — KETOROLAC TROMETHAMINE 30 MG/ML
15 INJECTION, SOLUTION INTRAMUSCULAR; INTRAVENOUS ONCE
Status: COMPLETED | OUTPATIENT
Start: 2021-04-29 | End: 2021-04-29

## 2021-04-29 RX ORDER — MORPHINE SULFATE 2 MG/ML
2 INJECTION, SOLUTION INTRAMUSCULAR; INTRAVENOUS
Status: DISCONTINUED | OUTPATIENT
Start: 2021-04-29 | End: 2021-04-29 | Stop reason: HOSPADM

## 2021-04-29 RX ORDER — GLYCOPYRROLATE 0.2 MG/ML
0.2 INJECTION INTRAMUSCULAR; INTRAVENOUS EVERY 4 HOURS PRN
Status: DISCONTINUED | OUTPATIENT
Start: 2021-04-29 | End: 2021-04-29 | Stop reason: HOSPADM

## 2021-04-29 RX ADMIN — MORPHINE SULFATE 2 MG: 2 INJECTION, SOLUTION INTRAMUSCULAR; INTRAVENOUS at 13:25

## 2021-04-29 RX ADMIN — SODIUM CHLORIDE, PRESERVATIVE FREE 10 ML: 5 INJECTION INTRAVENOUS at 05:26

## 2021-04-29 RX ADMIN — LORAZEPAM 1 MG: 2 INJECTION INTRAMUSCULAR; INTRAVENOUS at 07:51

## 2021-04-29 RX ADMIN — IPRATROPIUM BROMIDE AND ALBUTEROL SULFATE 1 AMPULE: .5; 3 SOLUTION RESPIRATORY (INHALATION) at 11:27

## 2021-04-29 RX ADMIN — SODIUM CHLORIDE, PRESERVATIVE FREE 10 ML: 5 INJECTION INTRAVENOUS at 03:20

## 2021-04-29 RX ADMIN — MORPHINE SULFATE 2 MG: 2 INJECTION, SOLUTION INTRAMUSCULAR; INTRAVENOUS at 12:00

## 2021-04-29 RX ADMIN — LORAZEPAM 1 MG: 2 INJECTION INTRAMUSCULAR; INTRAVENOUS at 15:04

## 2021-04-29 RX ADMIN — LORAZEPAM 1 MG: 2 INJECTION INTRAMUSCULAR; INTRAVENOUS at 03:19

## 2021-04-29 RX ADMIN — IPRATROPIUM BROMIDE AND ALBUTEROL SULFATE 1 AMPULE: .5; 3 SOLUTION RESPIRATORY (INHALATION) at 07:35

## 2021-04-29 RX ADMIN — MORPHINE SULFATE 2 MG: 2 INJECTION, SOLUTION INTRAMUSCULAR; INTRAVENOUS at 05:26

## 2021-04-29 RX ADMIN — MORPHINE SULFATE 2 MG: 2 INJECTION, SOLUTION INTRAMUSCULAR; INTRAVENOUS at 15:56

## 2021-04-29 RX ADMIN — KETOROLAC TROMETHAMINE 15 MG: 30 INJECTION, SOLUTION INTRAMUSCULAR; INTRAVENOUS at 00:45

## 2021-04-29 RX ADMIN — LORAZEPAM 1 MG: 2 INJECTION INTRAMUSCULAR; INTRAVENOUS at 11:02

## 2021-04-29 RX ADMIN — IPRATROPIUM BROMIDE AND ALBUTEROL SULFATE 1 AMPULE: .5; 3 SOLUTION RESPIRATORY (INHALATION) at 15:23

## 2021-04-29 RX ADMIN — DEXTROSE AND SODIUM CHLORIDE: 5; 450 INJECTION, SOLUTION INTRAVENOUS at 03:21

## 2021-04-29 RX ADMIN — MORPHINE SULFATE 2 MG: 2 INJECTION, SOLUTION INTRAMUSCULAR; INTRAVENOUS at 10:15

## 2021-04-29 RX ADMIN — MORPHINE SULFATE 2 MG: 2 INJECTION, SOLUTION INTRAMUSCULAR; INTRAVENOUS at 09:15

## 2021-04-29 RX ADMIN — MORPHINE SULFATE 2 MG: 2 INJECTION, SOLUTION INTRAMUSCULAR; INTRAVENOUS at 08:14

## 2021-04-29 ASSESSMENT — PAIN SCALES - GENERAL: PAINLEVEL_OUTOF10: 7

## 2021-04-29 NOTE — PLAN OF CARE
Received perfect serve stating patient family wants to talk to doctor . I went there and discuss with family in detail the code status and difference between [facility] vs [facility] . POA who was patient's daughter who verbalize understanding , opted to go for [facility] . [facility] form was signed . [facility] care initiated .  Palliative on board    Mathew Dunn MD  Internal Medicine Resident, PGY1   [address] [place] [address], [facility]  4/29/2021,3:25 AM

## 2021-04-29 NOTE — FLOWSHEET NOTE
SPIRITUAL CARE DEPARTMENT - Antolin Tillman 83  PROGRESS NOTE    Shift date: 04/29/2021  Shift day: Wednesday   Shift # 3    Room # 0329/0329-02   Name: Arsalan Fierro            Age: 80 y.o. Gender: male          Sabianism: Ruben Solis 33 of Restoration: [facility]. [place]    Referral: Routine Visit    Admit Date & Time: 4/28/2021 10:38 AM    PATIENT/EVENT DESCRIPTION:  Arsalan Fierro is a 80 y.o. male at end of life. Family requested \"last rites. \"       SPIRITUAL ASSESSMENT/INTERVENTION:  Five family members were bedside with patient. They seemed anxious and were tearful.  provided ministry of presence and active listening.  explained that  does not come in until 8 am. Family was still open to prayer.  prayed \"prayer of commendation\" and anointed patient. Family expressed gratitude and requested Jhon Donnelly to come when he arrived.  wrote referral for  who arrives at 8 am.      SPIRITUAL CARE FOLLOW-UP PLAN:  Chaplains will remain available to offer spiritual and emotional support as needed. Electronically signed by Rona Paul, on 4/29/2021 at 1:48 AM.  [facility]  [phone]       04/29/21 0145   Encounter Summary   Services provided to: Patient and family together   Referral/Consult From: Nurse   Support System Family members   Continue Visiting   (04/29/2021)   Complexity of Encounter High   Length of Encounter 15 minutes   Spiritual Assessment Completed Yes   Grief and Life Adjustment   Type End of life   Assessment Approachable;Tearful;Grieving   Intervention Active listening;Prayer; Anointed;Sustaining presence/ Ministry of presence;Ritual   Outcome Expressed gratitude; Tearful;Grieving

## 2021-04-29 NOTE — PROGRESS NOTES
[facility]  Internal Medicine Teaching Residency Program  Inpatient Daily Progress Note  ______________________________________________________________________________    Patient: Nichol De Santiago  YOB: 1931   [facility][id number]    Acct: [de-identified]     Room: 200/1-46  Admit date: 4/28/2021  Today's date: 04/29/21  Number of days in the hospital: 1    SUBJECTIVE   Admitting Diagnosis: <principal problem not specified>  CC: Altered mental status, breathing difficulty  Pt examined at bedside. Chart & results reviewed. Overnight, patient's labs showed worsening  Hypoxia. Family did not want any intubation, ABGs. CODE STATUS was eventually changed to DNR CC  Currently on comfort care measures. Today on BiPAP, family considering getting BiPAP off after discussing management themselves. Waiting to talk to palliative care. ROS:  Constitutional:  negative for chills, fevers, sweats  Respiratory:  negative for cough, dyspnea on exertion, hemoptysis, shortness of breath, wheezing  Cardiovascular:  negative for chest pain, chest pressure/discomfort, lower extremity edema, palpitations  Gastrointestinal:  negative for abdominal pain, constipation, diarrhea, nausea, vomiting  Neurological:  negative for dizziness, headache  BRIEF HISTORY     The patient is a pleasant 80 y.o. male presents with a chief complaint of altered mental status and breathing difficulty. Patient has a history of dementia, COPD not on any home oxygen, hypertension, atrial fibrillation/flutter on Eliquis, bowel resection-partial versus total, duodenal ulcer/GI bleed in 2018     Patient's daughter at bedside, patient unable to give history because of altered mental status. Patient's daughter states that patient's dementia has been getting worse for a long time now, but for past 3 days patient has not been eating or drinking properly, neither sleeping well.   Patient has been having breathing accessory muscles. No egophony noted. Cardiovascular: Regular without murmur, clicks, gallops or rubs. Abdomen: Slightly rounded and soft without organomegaly. No rebound, rigidity or guarding was appreciated. Lymphatic: No lymphadenopathy. Musculoskeletal: Normal curvature of the spine. No gross muscle weakness. Extremities:  No lower extremity edema, ulcerations, tenderness, varicosities or erythema. Muscle size, tone and strength are normal.  No involuntary movements are noted. Skin:  Warm and dry. Good color, turgor and pigmentation. No lesions or scars. No cyanosis or clubbing  Neurological/Psychiatric: The patient's general behavior, level of consciousness, thought content and emotional status is normal.        Medications:  Scheduled Medications:    LORazepam  1 mg Intravenous Q4H    ipratropium-albuterol  1 ampule Inhalation 4x daily    sodium chloride flush  5-40 mL Intravenous 2 times per day    sodium chloride flush  5-40 mL Intravenous 2 times per day     Continuous Infusions:   PRN Medicationsmorphine, 2 mg, Q1H PRN  sodium chloride flush, 5-40 mL, PRN  acetaminophen, 650 mg, Q4H PRN  dextromethorphan-guaiFENesin, 10 mL, Q6H PRN  sodium chloride flush, 5-40 mL, PRN  promethazine, 12.5 mg, Q6H PRN    Or  ondansetron, 4 mg, Q6H PRN  acetaminophen, 650 mg, Q6H PRN    Or  acetaminophen, 650 mg, Q6H PRN  albuterol, 2.5 mg, As Directed RT PRN        Diagnostic Labs:  CBC:   Recent Labs     04/28/21  1022   WBC 6.4   RBC 4.33   HGB 11.8*   HCT 40.2*   MCV 92.8   RDW 16.7*        BMP:   Recent Labs     04/28/21  1022      K 4.8   CL 98   CO2 31   BUN 34*   CREATININE 0.76     BNP: No results for input(s): BNP in the last 72 hours. PT/INR: No results for input(s): PROTIME, INR in the last 72 hours. APTT: No results for input(s): APTT in the last 72 hours. CARDIAC ENZYMES: No results for input(s): CKMB, CKMBINDEX, TROPONINI in the last 72 hours.     Invalid input(s):

## 2021-04-29 NOTE — CARE COORDINATION
Lane Osman NP called and informed CM that Hospice consult was placed. She informed CM that pt's family would like pt to go to [facility] and that pt's family would like meeting     [phone] to [place] with [facility], informed her of Hospice consult and family request for meeting after 2pm. She asked that CM fax Hospice order, labs, notes, H & P, [facility], [facility], face sheet and MAR to [phone]    Melissa RN called and she will be here at 2pm.     1040 informed pt's family that Bruno Curtis RN will be here at 2pm as requested. [address] order, labs, notes, [facility], [facility], [facility], face sheet and MAR to [phone]    1250 Confirmed with Ivette Galeana with  [facility] transportation at [facility] with [facility] [address] called, [facility] ambulance services will be here between 4-4:15p to transport pt to [address] [place] [address] with HOLDEN CEDENO [facility] informed CM that everything is completed for pt transferred. Report has already been given and all information has been faxed.  [phone]

## 2021-04-29 NOTE — PROGRESS NOTES
Physical Therapy    DATE: 2021    NAME: Elio Berry  MRN: [id number]   : 10/26/1931      Patient not seen this date for Physical Therapy due to: Other: Will defer PT eval due to decline in Pt status. . Please reorder if there are any changes      Electronically signed by Nuria Livingston PT on 2021 at 3:32 PM

## 2021-04-29 NOTE — CONSULTS
was negative for any acute abnormalities. Chest x-ray was negative for acute abnormalities, hyperinflation redemonstrated, and bibasilar changes of cystic bronchiectasis. EKG revealed A. fib with premature ventricular aberrantly conducted complexes, nonspecific change in ST segment in inferior leads, nonspecific T wave abnormality, and rightward axis. Patient was given breathing treatment, respiratory status continued to worsen. ER spoke with family including wife in regards to [address] [place] [address], and patient was made a DNR CCA no intubation. Patient required BiPAP due to continued decline, and family had decided this a.m. to make patient comfort care. Patient was changed to nonrebreather for comfort per family's request.  Palliative care was consulted. Goals, CODE STATUS, and support. Active Hospital Problems    Diagnosis Date Noted    Hypoxia [R09.02] 04/28/2021    Dementia ([facility] [F03.90] 04/28/2021    Anticoagulated [Z79.01]     Altered mental state [R41.82] 07/16/2018    COPD exacerbation ([facility] [J44.1] 03/09/2015    Atrial fibrillation ([facility] [I48.91] 03/01/2015       PAST MEDICAL HISTORY      Diagnosis Date    Atrial fibrillation [facility]) 5/914 12/13/13  - heart ablation of dysrhythmia    Cancer ([facility]     vocal cords-radiation    COPD (chronic obstructive pulmonary disease) (HCC)     Emphysema of lung (HCC)     Hernia, inguinal, bilateral     rt.     Hypertension        PAST SURGICAL HISTORY  Past Surgical History:   Procedure Laterality Date    APPENDECTOMY      CARDIAC SURGERY Right 5-29-15    inguinal hernia with mesh    CATARACT REMOVAL      COLONOSCOPY      COLONOSCOPY  5/12/15    ulceration at the anastomosis with the small bowel-pathology acute and chronic inflammation, rectal polyp-tubular adenoma    SMALL INTESTINE SURGERY      UPPER GASTROINTESTINAL ENDOSCOPY N/A 7/28/2018    EGD CONTROL HEMORRHAGE performed by Gertrudis Mcgee MD at [address] [place] [address] 2018    UPPER GASTROINTESTINAL ENDOSCOPY N/A 2018    EGD ESOPHAGOGASTRODUODENOSCOPY performed by Yumi Sanchez MD at [address] Bilateral 2014    Bilateral temporal artery biopsy       SOCIAL HISTORY  Social History     Tobacco Use    Smoking status: Former Smoker     Packs/day: 0.25     Years: 24.00     Pack years: 6.00     Start date: 1946     Quit date: 1970     Years since quittin.3    Smokeless tobacco: Never Used   Substance Use Topics    Alcohol use: Yes     Alcohol/week: 0.0 standard drinks     Comment: socially    Drug use: No       ALLERGIES  Allergies   Allergen Reactions    Codeine Other (See Comments)     Upset stomach    Fentanyl      Aggitated, confused    Pcn [Penicillins] Other (See Comments)     Upset stomch         MEDICATIONS  Current Medications    LORazepam  1 mg Intravenous Q4H    ipratropium-albuterol  1 ampule Inhalation 4x daily    sodium chloride flush  5-40 mL Intravenous 2 times per day    sodium chloride flush  5-40 mL Intravenous 2 times per day     morphine, sodium chloride flush, acetaminophen, dextromethorphan-guaiFENesin, sodium chloride flush, promethazine **OR** ondansetron, acetaminophen **OR** acetaminophen, albuterol  IV Drips/Infusions    Home Medications  No current facility-administered medications on file prior to encounter.       Current Outpatient Medications on File Prior to Encounter   Medication Sig Dispense Refill    ELIQUIS 2.5 MG TABS tablet TAKE 1 TABLET BY MOUTH TWO TIMES A DAY      mirtazapine (REMERON) 15 MG tablet TAKE 1 TABLET BY MOUTH AT BEDTIME      montelukast (SINGULAIR) 10 MG tablet TAKE 1 TABLET BY MOUTH ONE TIME A DAY      albuterol-ipratropium (COMBIVENT RESPIMAT)  MCG/ACT AERS inhaler INHALE 1 PUFF 4 TIMES A DAY AS DIRECTED 4 g 11    COMBIVENT RESPIMAT  MCG/ACT AERS inhaler INHALE 1 PUFF BY MOUTH EVERY SIX HOURS  4 g 0    SYMBICORT 160-4.5 MCG/ACT AERO INHALE 2 PUFFS 2 TIMES A DAY (Patient not taking: Reported on 10/16/2020) 1 Inhaler 11    metoprolol succinate (TOPROL XL) 25 MG extended release tablet Take 2 tablets by mouth daily 30 tablet 3    pantoprazole (PROTONIX) 40 MG tablet Take 1 tablet by mouth 2 times daily for 14 days (Patient not taking: Reported on 10/16/2020) 28 tablet 0    donepezil (ARICEPT) 10 MG tablet Take 1 tablet by mouth nightly (Patient not taking: Reported on 10/16/2020) 30 tablet 3    zolpidem (AMBIEN) 10 MG tablet Take 10 mg by mouth nightly as needed for Sleep. [place] calcium carbonate 1500 (600 Ca) MG TABS tablet Take 1 tablet by mouth daily (Patient not taking: Reported on 10/16/2020) 60 tablet 3    vitamin D (ERGOCALCIFEROL) 51480 units CAPS capsule Take 1 capsule by mouth once a week for 11 doses (Patient not taking: Reported on 10/16/2020) 8 capsule 0    colchicine (COLCRYS) 0.6 MG tablet Take 1 tablet by mouth 2 times daily (Patient not taking: Reported on 10/16/2020) 30 tablet 1    ipratropium (ATROVENT) 0.06 % nasal spray Pt does not take  6    dextromethorphan-guaiFENesin (ROBITUSSIN-DM)  MG/5ML syrup Take 10 mLs by mouth every 6 hours as needed for Cough. (Patient not taking: Reported on 10/16/2020) 1 Bottle 1    tamsulosin (FLOMAX) 0.4 MG capsule Take 0.3 mg by mouth daily          Data         BP (!) 103/50   Pulse 67   Temp 97 °F (36.1 °C) (Temporal)   Resp 17   Wt 118 lb (53.5 kg)   SpO2 93%   BMI 18.48 kg/m²     Wt Readings from Last 3 Encounters:   04/28/21 118 lb (53.5 kg)   05/17/19 132 lb (59.9 kg)   08/30/18 124 lb (56.2 kg)        Code Status: DNR-CC     ADVANCED CARE PLANNING:  Patient has capacity for medical decisions: not asked  Health Care Power of : yes - wife and then daughter Mckayla Hannon according to ACP note. Wife and daughter making decisions together.    Living Will: not asked     Personal, Social, and Family History  Marital Status:   Living situation: with family:  spouse  Importance of mandi/Adventism/spiritual beliefs: [x] Very [] Somewhat [] Not   Psychological Distress:   Does patient understand diagnosis/treatment? not asked  Does caregiver understand diagnosis/treatment? yes    Past Medical History:   Diagnosis Date    Atrial fibrillation [facility]) 13  - heart ablation of dysrhythmia    Cancer (HCC)     vocal cords-radiation    COPD (chronic obstructive pulmonary disease) (HCC)     Emphysema of lung ([address]     Hernia, inguinal, bilateral     rt.  Hypertension          Family History   Problem Relation Age of Onset    Cancer Father         prostate    Cancer Sister         in abdomen    Cancer Brother         brain       Social History     Tobacco Use    Smoking status: Former Smoker     Packs/day: 0.25     Years: 24.00     Pack years: 6.00     Start date: 1946     Quit date: 1970     Years since quittin.3    Smokeless tobacco: Never Used   Substance Use Topics    Alcohol use: Yes     Alcohol/week: 0.0 standard drinks     Comment: socially    Drug use: No           Assessment        REVIEW OF SYSTEMS  Unable to assess- pt minimally responsive    PHYSICAL ASSESSMENT:  Constitutional: lethargic/minimally responsive   Head: Normocephalic and atraumatic. Neck: Neck supple. No tracheal deviation present. Cardiovascular: Normal rate/ST and irregular rhythm, S1, S2, no murmur. Pulmonary/Chest: Effort normal and breath sounds shallow/rhonchi/diminished. No rales or wheezes. Abdomen: Soft. No tenderness, not distended, no ascites, no organomegaly. Musculoskeletal: No edema lower ext. Neurological: Patient minimally responsive to verbal stimuli, and does not follow commands. Skin: Normal turgor, no bleeding, no bruising. Palliative Performance Scale:  ___60%  Ambulation reduced; Significant disease; Can't do hobbies/housework; intake normal or reduced; occasional assist; LOC full/confusion  ___50%  Mainly sit/lie;  Extensive disease; Can't do any work; Considerable assist; intake normal or reduced; LOC full/confusion  ___40%  Mainly in bed; Extensive disease; Mainly assist; intake normal or reduced; LOC full/confusion   ___30%  Bed Bound; Extensive disease; Total care; intake reduced; LOC full/confusion  ___20%  Bed Bound; Extensive disease; Total care; intake minimal; Drowsy/coma  _x__10%  Bed Bound; Extensive disease; Total care; Mouth care only; Drowsy/coma  ___0       Death      Plan      Palliative Interaction: I visited patient, and he was lying in bed on NRB with eyes closed. Many family members at bedside including wife, daughters, and grandchildren. I sat down with family, and introduced myself to them, and the palliative role. I asked them about patients chronic history, and current hospitalized problems. Wife reports patients was progressively declining at home, and would not eat/drink. She reports patient becoming weaker, and brought to the hospital.     Once arrived to ER family report patients respiratory status continued to worsen, and code status was discussed. They report knowing that patient would not want resuscitative measures, and changed code status to [facility]. They report patient continues to worsen, and they decided to change to comfort care. Patient has only been minimally responsive. I discussed hospice care with family, and answered many questions. I discussed options, as well as locations of each inpatient facility. Family considered home, but d/t not being able to provide 24/7 care they requested more information on inpatient options. They do not want patient to go to a facility, and patient is too symptomatic to go to ECF. I called each inpatient facility to get visitation restrictions, and if they had bed available. I provided this to family, and they report [facility] being close to them, and are happier with their visitation numbers, since they have a larger family that want to visit.      I discussed spiritual concerns, and family reports to be Pentecostal, and report  rounding this AM and Anointing patient. They denied any further needs. Wife appears to be grieving, and family having some anticipatory grief. They asked about timing, and I informed them that this is not known. I did discuss upon evaluation that patient will likely be stable enough to transfer inpatient vs bed conversion. I offered family much emotional support, and they were appreciative of visit. I updated CM, and she arranged 2PM meeting with Ebide and family today. [facility] per . Education/support to staff  Education/support to family  Discharge planning/helping to coordinate care  Communications with primary service  Providing support for coping/adaptation/distress of family  Managing anticipatory grief  Discussing meaning/purpose   Specific spiritual beliefs/practices  Continue with current plan of care  Clarification of medical condition to patient and family  Code status clarified: [facility]  Palliative care orders introduced  Provided information about hospice  Validating patient/family distress  Family has made patient a [facility], and is wanting hospice care. They were informed of all options/companies, and have selected [facility]. CARLITOS Mishra to set appointment up for after 2PM today. Principle Problem/Diagnosis:  Acute hypoxic respiratory failure    Additional Assessments:   Active Problems:    Atrial fibrillation (HCC)    COPD exacerbation (HCC)    Altered mental state    Anticoagulated    Hypoxia    Dementia (HCC)  Resolved Problems:    * No resolved hospital problems. *    1- Symptom management/ pain control     Pain Assessment:  The patients pain is controlled with current medications including- MS 2mg Q1 hours PRN. Anxiety:  none - Ativan scheduled Q 4 hours PRN- will change to Q2 hours PRN with range as needed.                            Dyspnea:  acute dyspnea and chronic dyspnea - on NRB for comfort Fatigue:  Minimally responsive    Other: Malnutrition- BMI 18.48/53.5Kg- NPO    We feel the patient symptoms are being controlled. his current regimen is reviewed by myself and discussed with the staff. 2- Goals of care evaluation   The patient goals of care are provide comfort care/support/palliation/relieve suffering, spiritual needs and support for family/caregiver   Goals of care discussed with:    [] Patient independently    [] Patient and Family    [x] Family or Healthcare DPOA independently    [] Unable to discuss with patient, family/DPOA not present    3- Code Status  DNR-CC    4- Other recommendations   - We will continue to provide comfort and support to the patient and the family  Please call with any palliative questions or concerns. Palliative Care Team is available via perfect serve or via phone. Palliative Care will continue to follow Mr. Kavita Conteh care as needed. Thank you for allowing Palliative Care to participate in the care of Mr. Nick Allen . This note has been dictated by dragon, typing errors may be a possibility.     The total time I spent in seeing the patient, discussing goals of care, advanced directives, code status, greater than 50% time in counseling and other major issues was more than 60 minutes      Electronically signed by   BRIANNE Aguilera - CNP  Palliative Care Team  on 4/29/2021 at 9:44 AM    Palliative care office: [phone]

## 2021-04-29 NOTE — PROGRESS NOTES
Physician Progress Note      PATIENT:               José Miguel Santos  CSN #:                  [id number]  :                       10/26/1931  ADMIT DATE:       2021 10:38 AM  DISCH DATE:  RESPONDING  PROVIDER #:        Johana Hawley MD          QUERY TEXT:    Pt admitted with Acute Hypoxic respiratory Failure. Pt noted to have Altered   Mental Status. If possible, please document in the progress notes and   discharge summary if you are evaluating and / or treating any of the   following: The medical record reflects the following:  Risk Factors: Age, Dementia  Clinical Indicators:  Per family pt w/hx of dementia & was independent with   help of wife - but for past 3 days patient has not been eating or drinking   properly, not sleeping well . Admitted with Acute hypoxic respiratory failure   secondary to COPD exacerbation. Per H&P 'Altered mental status likely due to   hypoxia, hypoglycemia/dehydration with history of dementia'. Treatment: NRB,Comfort care measures, Palliative care consult  Options provided:  -- Anoxic encephalopathy  -- Metabolic encephalopathy  -- Toxic encephalopathy  -- Delirium due to, Please specify cause. -- Delirium  -- Dementia only-not Encephalopathic  -- Other - I will add my own diagnosis  -- Disagree - Not applicable / Not valid  -- Disagree - Clinically unable to determine / Unknown  -- Refer to Clinical Documentation Reviewer    PROVIDER RESPONSE TEXT:    This patient has metabolic encephalopathy.     Query created by: Vernia Krabbe on 2021 11:07 AM      Electronically signed by:  Johana Hawley MD 2021 12:33 PM

## 2021-04-29 NOTE — SIGNIFICANT EVENT
Repeat VGB on 40% FiO2-BiPAP returned pH-7.273, pCO2-75.9, pO2-25.7. Went to patient's room to discuss critically low oxygen level with patient's family including his daughter who is POA, along with rest of family, including patient's wife and granddaughter. Explained thoroughly that oxygen is critically low and without intubation, respiratory/cardiac arrest is imminent. Discussed that transitioning to comfort care may be best for patient to minimize pain/suffering. Patient's family including POA still hesisant to transition patient fully to DNR-comfort care at this time. .. Patient remains DNR-CCA (no intubation, no cardioversion, no chest compressions). Will get ABG to acurately demonstrate hypoxia and have called RT to increase FiO2 in the meanwhile. Conversation witnessed by Dr. Herlinda Pulido. Adelaide and nursing staff.      Connie West MD  PGY-2, Department of Internal Medicine  [address] [place] [address]

## 2021-04-29 NOTE — PROGRESS NOTES
At approximately 18 Dr. Murtaza Yang and Dr. Chiu  were at the pt's bedside to discuss pt condition and code status with family. Daughter requested for last rites to be performed at 46. Respiratory (Barbie) was at bedside at 0135 for repeat ABG's. Family refused and asked Barbie to increase FIO2 to 60. Chaplain Patricio Shah) at bedside at [facility].

## 2021-04-29 NOTE — FLOWSHEET NOTE
Assessment: Patient was not responsive when  visited. Family was present and hinted that patient was in critical condition. Family said patient was raised Islam and a member of [address] [place] [address]. [address], [facility]. Family requested that patient be anointed. Intervention:  Maintained listening presence, offered support and prayed with family at patient's bedside. Patient received sacrament of anointing of the sick after praying with them. Outcome: Family was very appreciative of the anointing patient received. Follow up visits recommended for ongoing assessment of patient's condition and for more spiritual and emotional support to family. 04/29/21 0824   Encounter Summary   Services provided to: Patient and family together   Referral/Consult From: Other ; Family   Support System Family members   Continue Visiting   (04/29/2021)   Complexity of Encounter High   Length of Encounter 30 minutes   Spiritual Assessment Completed Yes   Routine   Type Follow up   Spiritual/Jew   Type Spiritual support   Assessment Approachable;Tearful   Intervention Active listening;Prayer; Anointing   Outcome Expressed gratitude   Sacraments   Sacrament of Sick-Anointing Anointed

## 2021-04-29 NOTE — PLAN OF CARE
Problem: Skin Integrity:  Goal: Will show no infection signs and symptoms  Description: Will show no infection signs and symptoms  Outcome: Ongoing  Goal: Absence of new skin breakdown  Description: Absence of new skin breakdown  Outcome: Ongoing     Problem: Falls - Risk of:  Goal: Will remain free from falls  Description: Will remain free from falls  Outcome: Ongoing  Goal: Absence of physical injury  Description: Absence of physical injury  Outcome: Ongoing     Problem: Coping:  Goal: Family's ability to cope with current situation will improve  Description: Family's ability to cope with current situation will improve  Outcome: Ongoing     Problem: Physical Regulation:  Goal: Complications related to the disease process, condition or treatment will be avoided or minimized  Description: Complications related to the disease process, condition or treatment will be avoided or minimized  Outcome: Ongoing     Problem: Respiratory:  Goal: Verbalizations of increased ease of respirations will increase  Description: Verbalizations of increased ease of respirations will increase  Outcome: Ongoing     Problem: Sensory:  Goal: Expressions of feelings of enhanced comfort will increase  Description: Expressions of feelings of enhanced comfort will increase  Outcome: Ongoing

## 2021-05-08 NOTE — DISCHARGE SUMMARY
NOT CHANGED    Details   ELIQUIS 2.5 MG TABS tablet TAKE 1 TABLET BY MOUTH TWO TIMES A DAY, DAWHistorical Med      mirtazapine (REMERON) 15 MG tablet TAKE 1 TABLET BY MOUTH AT BEDTIMEHistorical Med      montelukast (SINGULAIR) 10 MG tablet TAKE 1 TABLET BY MOUTH ONE TIME A DAYHistorical Med      !! albuterol-ipratropium (COMBIVENT RESPIMAT)  MCG/ACT AERS inhaler INHALE 1 PUFF 4 TIMES A DAY AS DIRECTED, Disp-4 g,R-11Normal      !! COMBIVENT RESPIMAT  MCG/ACT AERS inhaler INHALE 1 PUFF BY MOUTH EVERY SIX HOURS , Disp-4 g,R-0Normal      SYMBICORT 160-4.5 MCG/ACT AERO INHALE 2 PUFFS 2 TIMES A DAY, Disp-1 Inhaler, R-11Normal      metoprolol succinate (TOPROL XL) 25 MG extended release tablet Take 2 tablets by mouth daily, Disp-30 tablet, R-3Normal      pantoprazole (PROTONIX) 40 MG tablet Take 1 tablet by mouth 2 times daily for 14 days, Disp-28 tablet, R-0Normal      donepezil (ARICEPT) 10 MG tablet Take 1 tablet by mouth nightly, Disp-30 tablet, R-3Normal      zolpidem (AMBIEN) 10 MG tablet Take 10 mg by mouth nightly as needed for Sleep. [facility]      calcium carbonate 1500 (600 Ca) MG TABS tablet Take 1 tablet by mouth daily, Disp-60 tablet, R-3Normal      vitamin D (ERGOCALCIFEROL) 96371 units CAPS capsule Take 1 capsule by mouth once a week for 11 doses, Disp-8 capsule, R-0Normal      colchicine (COLCRYS) 0.6 MG tablet Take 1 tablet by mouth 2 times daily, Disp-30 tablet, R-1Print      ipratropium (ATROVENT) 0.06 % nasal spray Pt does not take, R-6Historical Med      dextromethorphan-guaiFENesin (ROBITUSSIN-DM)  MG/5ML syrup Take 10 mLs by mouth every 6 hours as needed for Cough. , Disp-1 Bottle, R-1      tamsulosin (FLOMAX) 0.4 MG capsule Take 0.3 mg by mouth daily        ! ! - Potential duplicate medications found. Please discuss with provider. Activity: activity as tolerated    Diet: regular diet    Follow-up:    No follow-up provider specified.     Patient Instructions: none  Follow up labs: none  Follow up imaging:none    Note that over 30 minutes was spent in preparing discharge papers, discussing discharge with patient, medication review, etc.      Monica Carlson MD,   Internal Medicine Resident, PGY-1  [address], [place]  5/8/2021, 12:36 PM

## (undated) DEVICE — BIPOLAR ELECTROHEMOSTASIS CATHETER: Brand: INJECTION GOLD PROBE